# Patient Record
Sex: FEMALE | Race: ASIAN | NOT HISPANIC OR LATINO | Employment: UNEMPLOYED | URBAN - METROPOLITAN AREA
[De-identification: names, ages, dates, MRNs, and addresses within clinical notes are randomized per-mention and may not be internally consistent; named-entity substitution may affect disease eponyms.]

---

## 2017-10-13 ENCOUNTER — OFFICE VISIT - HEALTHEAST (OUTPATIENT)
Dept: FAMILY MEDICINE | Facility: CLINIC | Age: 47
End: 2017-10-13

## 2017-10-13 DIAGNOSIS — R07.89 CHEST WALL PAIN: ICD-10-CM

## 2017-10-13 DIAGNOSIS — E55.9 VITAMIN D DEFICIENCY: ICD-10-CM

## 2017-10-13 DIAGNOSIS — R07.9 CHEST PAIN ON EXERTION: ICD-10-CM

## 2017-10-15 ENCOUNTER — COMMUNICATION - HEALTHEAST (OUTPATIENT)
Dept: FAMILY MEDICINE | Facility: CLINIC | Age: 47
End: 2017-10-15

## 2017-10-16 LAB
ATRIAL RATE - MUSE: 53 BPM
DIASTOLIC BLOOD PRESSURE - MUSE: NORMAL MMHG
INTERPRETATION ECG - MUSE: NORMAL
P AXIS - MUSE: 46 DEGREES
PR INTERVAL - MUSE: 146 MS
QRS DURATION - MUSE: 96 MS
QT - MUSE: 470 MS
QTC - MUSE: 441 MS
R AXIS - MUSE: 33 DEGREES
SYSTOLIC BLOOD PRESSURE - MUSE: NORMAL MMHG
T AXIS - MUSE: 18 DEGREES
VENTRICULAR RATE- MUSE: 53 BPM

## 2017-10-18 ENCOUNTER — RECORDS - HEALTHEAST (OUTPATIENT)
Dept: ADMINISTRATIVE | Facility: OTHER | Age: 47
End: 2017-10-18

## 2017-10-24 ENCOUNTER — OFFICE VISIT - HEALTHEAST (OUTPATIENT)
Dept: FAMILY MEDICINE | Facility: CLINIC | Age: 47
End: 2017-10-24

## 2017-10-24 DIAGNOSIS — R05.8 POST-VIRAL COUGH SYNDROME: ICD-10-CM

## 2017-10-24 DIAGNOSIS — B96.89 BACTERIAL CONJUNCTIVITIS OF BOTH EYES: ICD-10-CM

## 2017-10-24 DIAGNOSIS — H10.9 BACTERIAL CONJUNCTIVITIS OF BOTH EYES: ICD-10-CM

## 2017-10-24 DIAGNOSIS — E55.9 VITAMIN D DEFICIENCY: ICD-10-CM

## 2017-11-10 ENCOUNTER — RECORDS - HEALTHEAST (OUTPATIENT)
Dept: MAMMOGRAPHY | Facility: CLINIC | Age: 47
End: 2017-11-10

## 2017-11-10 ENCOUNTER — RECORDS - HEALTHEAST (OUTPATIENT)
Dept: ADMINISTRATIVE | Facility: OTHER | Age: 47
End: 2017-11-10

## 2017-11-10 ENCOUNTER — OFFICE VISIT - HEALTHEAST (OUTPATIENT)
Dept: FAMILY MEDICINE | Facility: CLINIC | Age: 47
End: 2017-11-10

## 2017-11-10 DIAGNOSIS — M54.50 LUMBAR BACK PAIN: ICD-10-CM

## 2017-11-10 DIAGNOSIS — Z23 NEED FOR IMMUNIZATION AGAINST INFLUENZA: ICD-10-CM

## 2017-11-10 DIAGNOSIS — Z12.31 VISIT FOR SCREENING MAMMOGRAM: ICD-10-CM

## 2017-11-10 DIAGNOSIS — E78.5 HYPERLIPEMIA: ICD-10-CM

## 2017-11-10 DIAGNOSIS — E55.9 VITAMIN D DEFICIENCY: ICD-10-CM

## 2017-11-10 DIAGNOSIS — Z12.31 ENCOUNTER FOR SCREENING MAMMOGRAM FOR MALIGNANT NEOPLASM OF BREAST: ICD-10-CM

## 2017-11-10 LAB
CHOLEST SERPL-MCNC: 270 MG/DL
FASTING STATUS PATIENT QL REPORTED: YES
HDLC SERPL-MCNC: 49 MG/DL
LDLC SERPL CALC-MCNC: 196 MG/DL
TRIGL SERPL-MCNC: 123 MG/DL

## 2017-11-10 ASSESSMENT — MIFFLIN-ST. JEOR: SCORE: 1095.82

## 2017-12-08 ENCOUNTER — OFFICE VISIT - HEALTHEAST (OUTPATIENT)
Dept: FAMILY MEDICINE | Facility: CLINIC | Age: 47
End: 2017-12-08

## 2017-12-08 DIAGNOSIS — J30.9 ALLERGIC RHINITIS: ICD-10-CM

## 2017-12-08 DIAGNOSIS — E55.9 VITAMIN D DEFICIENCY: ICD-10-CM

## 2017-12-08 DIAGNOSIS — L72.3 SEBACEOUS CYST: ICD-10-CM

## 2017-12-08 ASSESSMENT — MIFFLIN-ST. JEOR: SCORE: 1116.24

## 2018-01-18 ENCOUNTER — COMMUNICATION - HEALTHEAST (OUTPATIENT)
Dept: FAMILY MEDICINE | Facility: CLINIC | Age: 48
End: 2018-01-18

## 2018-01-23 ENCOUNTER — RECORDS - HEALTHEAST (OUTPATIENT)
Dept: ADMINISTRATIVE | Facility: OTHER | Age: 48
End: 2018-01-23

## 2018-02-14 ENCOUNTER — COMMUNICATION - HEALTHEAST (OUTPATIENT)
Dept: FAMILY MEDICINE | Facility: CLINIC | Age: 48
End: 2018-02-14

## 2018-02-14 RX ORDER — CELECOXIB 200 MG/1
CAPSULE ORAL
Qty: 30 CAPSULE | Refills: 0 | Status: SHIPPED | OUTPATIENT
Start: 2018-02-14 | End: 2023-02-10

## 2019-01-14 ENCOUNTER — OFFICE VISIT - HEALTHEAST (OUTPATIENT)
Dept: FAMILY MEDICINE | Facility: CLINIC | Age: 49
End: 2019-01-14

## 2019-01-14 ENCOUNTER — HOSPITAL ENCOUNTER (OUTPATIENT)
Dept: LAB | Age: 49
Setting detail: SPECIMEN
Discharge: HOME | End: 2019-01-14

## 2019-01-14 DIAGNOSIS — J30.9 ALLERGIC RHINITIS: ICD-10-CM

## 2019-01-14 DIAGNOSIS — J02.9 SORE THROAT: ICD-10-CM

## 2019-01-14 DIAGNOSIS — R05.9 COUGH: ICD-10-CM

## 2019-01-14 DIAGNOSIS — L72.3 SEBACEOUS CYST: ICD-10-CM

## 2019-01-14 DIAGNOSIS — J30.2 SEASONAL ALLERGIC RHINITIS, UNSPECIFIED TRIGGER: ICD-10-CM

## 2019-01-14 LAB — DEPRECATED S PYO AG THROAT QL EIA: NORMAL

## 2019-01-14 ASSESSMENT — MIFFLIN-ST. JEOR: SCORE: 1086.75

## 2019-01-16 LAB — GROUP A STREP BY PCR: NORMAL

## 2019-05-02 ENCOUNTER — OFFICE VISIT - HEALTHEAST (OUTPATIENT)
Dept: FAMILY MEDICINE | Facility: CLINIC | Age: 49
End: 2019-05-02

## 2019-05-02 DIAGNOSIS — E78.00 PURE HYPERCHOLESTEROLEMIA: ICD-10-CM

## 2019-05-02 DIAGNOSIS — Z23 IMMUNIZATION DUE: ICD-10-CM

## 2019-05-02 DIAGNOSIS — R09.82 POST-NASAL DRIP: ICD-10-CM

## 2019-05-02 DIAGNOSIS — R05.9 COUGH: ICD-10-CM

## 2019-05-02 DIAGNOSIS — Z12.31 VISIT FOR SCREENING MAMMOGRAM: ICD-10-CM

## 2019-05-02 DIAGNOSIS — J30.9 ALLERGIC RHINITIS: ICD-10-CM

## 2019-05-02 LAB
CHOLEST SERPL-MCNC: 258 MG/DL
FASTING STATUS PATIENT QL REPORTED: NO
HDLC SERPL-MCNC: 55 MG/DL
LDLC SERPL CALC-MCNC: 182 MG/DL
TRIGL SERPL-MCNC: 107 MG/DL

## 2019-05-02 RX ORDER — CETIRIZINE HYDROCHLORIDE 10 MG/1
10 TABLET ORAL DAILY
Qty: 30 TABLET | Refills: 6 | Status: SHIPPED | OUTPATIENT
Start: 2019-05-02 | End: 2023-02-10

## 2019-05-02 ASSESSMENT — MIFFLIN-ST. JEOR: SCORE: 1075.41

## 2019-05-20 ENCOUNTER — OFFICE VISIT - HEALTHEAST (OUTPATIENT)
Dept: FAMILY MEDICINE | Facility: CLINIC | Age: 49
End: 2019-05-20

## 2019-05-20 ENCOUNTER — RECORDS - HEALTHEAST (OUTPATIENT)
Dept: MAMMOGRAPHY | Facility: CLINIC | Age: 49
End: 2019-05-20

## 2019-05-20 DIAGNOSIS — R05.9 COUGH: ICD-10-CM

## 2019-05-20 DIAGNOSIS — Z12.31 ENCOUNTER FOR SCREENING MAMMOGRAM FOR MALIGNANT NEOPLASM OF BREAST: ICD-10-CM

## 2019-05-20 ASSESSMENT — MIFFLIN-ST. JEOR: SCORE: 1089.02

## 2019-05-31 ENCOUNTER — OFFICE VISIT - HEALTHEAST (OUTPATIENT)
Dept: FAMILY MEDICINE | Facility: CLINIC | Age: 49
End: 2019-05-31

## 2019-05-31 DIAGNOSIS — Z23 IMMUNIZATION DUE: ICD-10-CM

## 2019-05-31 DIAGNOSIS — Z00.00 ROUTINE GENERAL MEDICAL EXAMINATION AT A HEALTH CARE FACILITY: ICD-10-CM

## 2019-05-31 DIAGNOSIS — R05.9 COUGH: ICD-10-CM

## 2019-05-31 ASSESSMENT — MIFFLIN-ST. JEOR: SCORE: 1088.57

## 2019-06-03 LAB
HPV SOURCE: NORMAL
HUMAN PAPILLOMA VIRUS 16 DNA: NEGATIVE
HUMAN PAPILLOMA VIRUS 18 DNA: NEGATIVE
HUMAN PAPILLOMA VIRUS FINAL DIAGNOSIS: NORMAL
HUMAN PAPILLOMA VIRUS OTHER HR: NEGATIVE
SPECIMEN DESCRIPTION: NORMAL

## 2019-06-13 LAB
BKR LAB AP ABNORMAL BLEEDING: NO
BKR LAB AP BIRTH CONTROL/HORMONES: NORMAL
BKR LAB AP CERVICAL APPEARANCE: NORMAL
BKR LAB AP GYN ADEQUACY: NORMAL
BKR LAB AP GYN INTERPRETATION: NORMAL
BKR LAB AP HPV REFLEX: NORMAL
BKR LAB AP LMP: NORMAL
BKR LAB AP PATIENT STATUS: NORMAL
BKR LAB AP PREVIOUS ABNORMAL: NORMAL
BKR LAB AP PREVIOUS NORMAL: NORMAL
HIGH RISK?: NO
INTERPRETING LAB: NORMAL
PATH REPORT.COMMENTS IMP SPEC: NORMAL
PATH REPORT.COMMENTS IMP SPEC: NORMAL
RESULT FLAG (HE HISTORICAL CONVERSION): NORMAL

## 2021-05-28 NOTE — PROGRESS NOTES
"  Chief Complaint   Patient presents with     Cough     Since January and still cough          HPI:   Rafael Colin is a 48 y.o. female with  c/o cough for the last four months without improvement.  Productive cough. States cough has never gone away.  Occasionally shortness of breath.  Has had some chest pain with coughing.  Sometimes has regurgitation of food with coughing.  No fever.  Throat is itching.  Was seen and given zyrtec-this didn't help.--early May.  Denies heart burn.  Has some pain behind left ear for 3 months. Has h/o abcess drainage that at age 14 year.        ROS:  A 10 point comprehensive review of systems was negative except as noted.     Medications:  Current Outpatient Medications on File Prior to Visit   Medication Sig Dispense Refill     cetirizine (ZYRTEC) 10 MG tablet Take 1 tablet (10 mg total) by mouth daily. 30 tablet 6     artificial tears, hypromellose, (GONIOLSOL) 2.5 % ophthalmic solution 1 drop as needed.       celecoxib (CELEBREX) 200 MG capsule TAKE 1 CAPSULE BY MOUTH DAILY 30 capsule 0     cholecalciferol, vitamin D3, (VITAMIN D3) 2,000 unit capsule Take 1 capsule (2,000 Units total) by mouth daily. 30 capsule 12     ibuprofen (ADVIL,MOTRIN) 600 MG tablet Take 1 mg by mouth every 6 (six) hours as needed for pain.       No current facility-administered medications on file prior to visit.          Social History:  Social History     Tobacco Use     Smoking status: Never Smoker     Smokeless tobacco: Never Used   Substance Use Topics     Alcohol use: No         Physical Exam:   Vitals:    05/20/19 1405   BP: 114/62   Pulse: 65   Resp: 16   Temp: 98.5  F (36.9  C)   TempSrc: Oral   SpO2: 99%   Weight: 129 lb (58.5 kg)   Height: 4' 9\" (1.448 m)       GENERAL:   Alert. Oriented.  EYES: Clear  HENT:  Ears: R TM pearly gray. Normal landmarks. L TM pearly gray.  Normal landmarks  Nose: Clear.  Sinuses: Nontender.  Oropharynx:  No erythema. No exudate.  NECK: Supple. No " adenopathy.  LUNGS: Clear to ascultation.  No crackles.  No wheezing  HEART: RRR  SKIN:  No rash.     XRAY:  CXR PA & LAT:   No cardiomegaly, infiltrate or effusion.  Normal chest. Personally reviewed film.      Assessment/Plan:    1. Cough  XR Chest 2 Views      Prolonged cough.    Has been on zyrtec without improvement.  Description is more of post nasal drainage than heartburn.    Will continue with zyrtec and add flonase nasal spray.    Plan four week trial.  Recheck before then if worsening otherwise in four week.hayden Beckwith MD      5/20/2019    The following portions of the patient's history were reviewed and updated as appropriate: allergies, current medications, past family history, past medical history, past social history, past surgical history and problem list.

## 2021-05-28 NOTE — PROGRESS NOTES
ASSESSMENT AND PLAN:  1. Visit for screening mammogram    - Mammo Screening Bilateral; Future    2. Pure hypercholesterolemia  She will return for a physical and her cholesterol drawn at that time  - Lipid Cascade    3. Post-nasal drip    She has significant postnasal drip.  4. Cough  Her cough has returned I was still her on ceterizine    5. Allergic rhinitis    - cetirizine (ZYRTEC) 10 MG tablet; Take 1 tablet (10 mg total) by mouth daily.  Dispense: 30 tablet; Refill: 6            Orders Placed This Encounter   Procedures     Mammo Screening Bilateral     Standing Status:   Future     Standing Expiration Date:   8/2/2020     Order Specific Question:   Patient's previous breast density:     Answer:   Scattered fibroglandular density [2]     Order Specific Question:   Is the patient pregnant?     Answer:   No     Order Specific Question:   Can the procedure be changed per Radiologist protocol?     Answer:   Yes     Td, Preservative Free (green label)     Lipid Cascade     Order Specific Question:   Fasting is required?     Answer:   No     Medications Discontinued During This Encounter   Medication Reason     cetirizine (ZYRTEC) 10 MG tablet Reorder       Return in about 1 month (around 5/30/2019) for Annual physical with a female provider and in 2 months for follow up on cough and sore throat.    CHIEF COMPLAINT:  Chief Complaint   Patient presents with     Sore Throat     [id number]      Cough     has leaking urine due to cough, lack of sleep       HISTORY OF PRESENT ILLNESS:  Rafael is a 48 y.o. female who presents to the clinic today for sore throat and cough. Rafael is present with a Claudia . She has had a sore throat and cough for 4 months. The patient was seen back in January by Dr. Beckwith at which time Strep test was negative. She has not been taking Zyrtec or dextromethorphan that had been recommended. Her sore throat continues to be sore and itchy. Her cough has caused her to leak urine, and it also  "interferes with sleep. Paw notes chest pain with coughing. She is uncertain if she snores at night.    REVIEW OF SYSTEMS:   ENT: Sore throat.  Respiratory: Cough.   : Urinary leakage with cough.  All other systems are negative.    PFSH:  She is . She is unemployed. Reviewed as below.     TOBACCO USE:  Social History     Tobacco Use   Smoking Status Never Smoker   Smokeless Tobacco Never Used       VITALS:  Vitals:    05/02/19 1436   BP: 122/80   Pulse: (!) 59   Resp: 16   Temp: 97.8  F (36.6  C)   TempSrc: Oral   SpO2: 99%   Weight: 126 lb (57.2 kg)   Height: 4' 9\" (1.448 m)     Wt Readings from Last 3 Encounters:   05/02/19 126 lb (57.2 kg)   01/14/19 128 lb 8 oz (58.3 kg)   12/08/17 135 lb (61.2 kg)     Body mass index is 27.27 kg/m .    PHYSICAL EXAM:  General: Alert, cooperative, no distress, appears stated age  Head: Normocephalic, without obvious abnormality, atraumatic  Eyes: PERRL, conjunctiva/cornea clear, EOM's intact  Ears: Normal TM's and external ear canals, both ears  Nose: Nares normal, septum midline, mucosa normal, no drainage or sinus tenderness  Throat: Lips, mucosa, and tongue normal; teeth and gums normal, oropharynx benign, postnasal drainage present  Lungs: Clear to auscultation bilaterally, respirations unlabored  Heart: Regular rate and rhythm, S1 and S2 normal, no murmur, rub, or gallop  Neurologic:  A & O x 3.  No tremor, no focal findings.  Normal gait.     DATA REVIEWED:  Additional History from Old Records Summarized (2): Reviewed Dr. Beckwith's 01/14/2019 note regarding sore throat and cough, attributed to viral infection.  Decision to Obtain Records (1): none  Radiology Tests Summarized or Ordered (1): Screening mammogram ordered.  Labs Reviewed or Ordered (1): Lipid cascade ordered.  Medicine Test Summarized or Ordered (1): none  Independent Review of EKG or X-RAY(2 each): none      ITracy, am scribing for and in the presence of, Dr. Knight.    IDr. Knight, personally " performed the services described in this documentation, as scribed by Tracy Cartagena in my presence, and it is both accurate and complete.      MEDICATIONS:  Current Outpatient Medications   Medication Sig Dispense Refill     artificial tears, hypromellose, (GONIOLSOL) 2.5 % ophthalmic solution 1 drop as needed.       celecoxib (CELEBREX) 200 MG capsule TAKE 1 CAPSULE BY MOUTH DAILY 30 capsule 0     cetirizine (ZYRTEC) 10 MG tablet Take 1 tablet (10 mg total) by mouth daily. 30 tablet 6     cholecalciferol, vitamin D3, (VITAMIN D3) 2,000 unit capsule Take 1 capsule (2,000 Units total) by mouth daily. 30 capsule 12     ibuprofen (ADVIL,MOTRIN) 600 MG tablet Take 1 mg by mouth every 6 (six) hours as needed for pain.       No current facility-administered medications for this visit.        Total Data Points: 4    Please note that this clinical encounter uses voice recognition software, there may be typographical errors present

## 2021-05-29 NOTE — PROGRESS NOTES
Assessment:Plan     1. Routine general medical examination at a health care facility  - Gynecologic Cytology (PAP Smear)    2. Cough  ?  Reflux.  Continue nasal spray and Claritin.  - guaiFENesin (ROBITUSSIN) 100 mg/5 mL syrup; Take 10 mL (200 mg total) by mouth 3 (three) times a day as needed for cough.  Dispense: 200 mL; Refill: 0    3. Immunization due  - Tdap vaccine greater than or equal to 8yo IM    Subjective:      Rafael Colin is a 48 y.o. female who presents for an annual exam. The patient is not currently sexually active. The patient participates in regular exercise: no. The patient reports that there is not domestic violence in her life.   She was seen 2 weeks ago for cough.  Chest x-ray was clear.  Was given Flonase and Claritin.  States that symptoms are somewhat improving but requesting cough syrup.  No fever.  Cough is nonproductive.      Healthy Habits:   Regular Exercise: Yes  Sunscreen Use: Yes  Healthy Diet: Yes  Dental Visits Regularly: Yes  Seat Belt: Yes  Sexually active: No  Self Breast Exam Monthly:Yes  Colonoscopy: No  Lipid Profile: Yes  Glucose Screen: Yes  Prevention of Osteoporosis: N/A  Last Dexa: N/A  Guns at Home:  No      Immunization History   Administered Date(s) Administered     Hep A, historic 10/01/2008, 04/01/2009     Hep B, historic 03/06/2007, 10/01/2008, 04/01/2009     Influenza [id number], 01/13/2010     Influenza, inj, historic,unspecified 11/05/2010, 10/04/2011     Influenza, seasonal,quad inj 36+ mos 10/23/2015, 11/10/2017     Influenza, seasonal,quad inj 6-35 mos 11/12/2008, 10/02/2009     Influenza,seasonal quad, PF, 36+MOS 01/14/2019     MMR 08/18/2008, 03/19/2009     Mumps 03/06/2007     Td,adult,historic,unspecified 03/06/2007, 03/19/2009     Tdap 08/18/2008     Varicella 03/06/2007, 08/18/2008     Immunization status: up to date and documented.    No exam data present    Gynecologic History  No LMP recorded.  Contraception: none  Last Pap: Several years ago  Last mammogram:  19. Results were: normal      OB History    Para Term  AB Living   4 4 4         SAB TAB Ectopic Multiple Live Births                  # Outcome Date GA Lbr Hany/2nd Weight Sex Delivery Anes PTL Lv   4 Term            3 Term            2 Term            1 Term                Current Outpatient Medications   Medication Sig Dispense Refill     artificial tears, hypromellose, (GONIOLSOL) 2.5 % ophthalmic solution 1 drop as needed.       celecoxib (CELEBREX) 200 MG capsule TAKE 1 CAPSULE BY MOUTH DAILY 30 capsule 0     cetirizine (ZYRTEC) 10 MG tablet Take 1 tablet (10 mg total) by mouth daily. 30 tablet 6     cholecalciferol, vitamin D3, (VITAMIN D3) 2,000 unit capsule Take 1 capsule (2,000 Units total) by mouth daily. 30 capsule 12     fluticasone propionate (FLONASE) 50 mcg/actuation nasal spray 2 sprays into each nostril daily. 16 g 11     ibuprofen (ADVIL,MOTRIN) 600 MG tablet Take 1 mg by mouth every 6 (six) hours as needed for pain.       No current facility-administered medications for this visit.      No past medical history on file.  No past surgical history on file.  Amoxicillin; Contrave [naltrexone-bupropion]; and Sulfamethoxazole-trimethoprim  Family History   Problem Relation Age of Onset     Breast cancer Neg Hx      Social History     Socioeconomic History     Marital status:      Spouse name: Not on file     Number of children: Not on file     Years of education: Not on file     Highest education level: Not on file   Occupational History     Occupation: Unemployed   Social Needs     Financial resource strain: Not on file     Food insecurity:     Worry: Not on file     Inability: Not on file     Transportation needs:     Medical: Not on file     Non-medical: Not on file   Tobacco Use     Smoking status: Never Smoker     Smokeless tobacco: Never Used   Substance and Sexual Activity     Alcohol use: No     Drug use: No     Sexual activity: Never   Lifestyle     Physical activity:  "    Days per week: Not on file     Minutes per session: Not on file     Stress: Not on file   Relationships     Social connections:     Talks on phone: Not on file     Gets together: Not on file     Attends Zoroastrian service: Not on file     Active member of club or organization: Not on file     Attends meetings of clubs or organizations: Not on file     Relationship status: Not on file     Intimate partner violence:     Fear of current or ex partner: Not on file     Emotionally abused: Not on file     Physically abused: Not on file     Forced sexual activity: Not on file   Other Topics Concern     Not on file   Social History Narrative    [place].  [place]       Review of Systems  Review of Systems        No abnormal vaginal discharge or irritation.  No depression symptoms.  No acute fever.  No weight loss or night sweats.  Objective:         Vitals:    05/31/19 1013   BP: 122/82   Pulse: 60   Resp: 16   Temp: 97.9  F (36.6  C)   TempSrc: Oral   SpO2: 99%   Weight: 128 lb 14.4 oz (58.5 kg)   Height: 4' 9\" (1.448 m)     Body mass index is 27.89 kg/m .    Physical  Physical Exam     Gen - alert, orientated, NAD  Eyes - fundascopic exam limited by the undialated pupil but looks symmetric  ENT - oropharynx clear, TMs clear  Neck - supple, no palpable mass or lymphadenopathy  CV - RRR, no murmur  Breast - no dominant mass on either side, no axillary mass.  Resp - lungs CTA  Ab - soft, nontender, no palpable mass or organomegaly   - normal appearance to the external genitalia, vaginal mucosa normal, physiologic discharge, no palpable adenexal mass, cervix appears normal  Extrem - warm, no edema  Neuro - CN II-XII intact, strength, sensation, reflexes intact and symmetric  Skin - no rash.  No atypical appearing lesions seen.          "

## 2021-05-31 VITALS — WEIGHT: 135 LBS | BODY MASS INDEX: 29.12 KG/M2 | HEIGHT: 57 IN

## 2021-05-31 VITALS — WEIGHT: 134.56 LBS | BODY MASS INDEX: 30.71 KG/M2

## 2021-05-31 VITALS — BODY MASS INDEX: 30.64 KG/M2 | WEIGHT: 134.25 LBS

## 2021-05-31 VITALS — BODY MASS INDEX: 30.14 KG/M2 | WEIGHT: 134 LBS | HEIGHT: 56 IN

## 2021-06-01 ENCOUNTER — RECORDS - HEALTHEAST (OUTPATIENT)
Dept: ADMINISTRATIVE | Facility: CLINIC | Age: 51
End: 2021-06-01

## 2021-06-02 VITALS — HEIGHT: 57 IN | BODY MASS INDEX: 27.72 KG/M2 | WEIGHT: 128.5 LBS

## 2021-06-03 VITALS — WEIGHT: 129 LBS | HEIGHT: 57 IN | BODY MASS INDEX: 27.83 KG/M2

## 2021-06-03 VITALS — WEIGHT: 128.9 LBS | HEIGHT: 57 IN | BODY MASS INDEX: 27.81 KG/M2

## 2021-06-03 VITALS — WEIGHT: 126 LBS | HEIGHT: 57 IN | BODY MASS INDEX: 27.18 KG/M2

## 2021-06-13 NOTE — PROGRESS NOTES
ASSESSMENT AND PLAN:  1. Bacterial conjunctivitis of both eyes patient will start ofloxacin.    2. Post-viral cough syndrome cetirizine suggested for cough.  She has not been using Lorazepam.    3. Vitamin D deficiency         CHIEF COMPLAINT:  Chief Complaint   Patient presents with     itchy eyes       HISTORY OF PRESENT ILLNESS:  Rafael is a 47 y.o. female presenting with itchy eyes. Rafael is present with a Claudia . She notes that for the past few days, she has been experiencing bilateral eye watering and itching. Her vision has become blurry due the watering.  She denies discharge or crusting over here eyes. She denies known ill contacts.     REVIEW OF SYSTEMS:   She states that she has an intermittent cough that worsens night time.   All other 10 point review of systems are negative.    PFSH:  . Pertinent past, family, social and medical history reviewed.     TOBACCO USE:  History   Smoking Status     Never Smoker   Smokeless Tobacco     Never Used       VITALS:  Vitals:    10/24/17 0925   BP: 118/78   Patient Site: Right Arm   Patient Position: Sitting   Cuff Size: Adult Large   Pulse: 60   Resp: 24   Temp: 98.4  F (36.9  C)   TempSrc: Oral   Weight: 134 lb 9 oz (61 kg)     Wt Readings from Last 3 Encounters:   10/24/17 134 lb 9 oz (61 kg)   10/13/17 134 lb 4 oz (60.9 kg)   01/12/16 129 lb 9 oz (58.8 kg)     Body mass index is 30.71 kg/(m^2).    PHYSICAL EXAM:  General: Alert, cooperative, no distress, appears stated age  Head: Normocephalic, without obvious abnormality, atraumatic  Eyes: Watery discharge over lateral canthus of left eye and medial canthus of right eye. No conjunctival injection.   Lungs: Clear to auscultation bilaterally, respirations unlabored  Chest wall: No tenderness or deformity  Heart: Regular rate and rhythm, S1 and S2 normal, no murmur, rub, or gallop  CVS: No edema noted.   Neurologic: No tremor, no focal findings.     Psych: Oriented x3. Affect normal.     DATA  REVIEWED:  Additional History from Old Records Summarized (2): None  Decision to Obtain Records (1): None  Radiology Tests Summarized or Ordered (1): None  Labs Reviewed or Ordered (1): none  Medicine Test Summarized or Ordered (1): None  Independent Review of EKG or X-RAY(2 each): none    The visit lasted a total of 9 minutes face to face with the patient. Over 50% of the time was spent counseling and educating the patient about eye itching.     Gume VILLA, am scribing for and in the presence of, Dr. Knight.    marly VILLA personally performed the services described in this documentation, as scribed by Gume Ferrara in my presence, and it is both accurate and complete.      MEDICATIONS:  Current Outpatient Prescriptions   Medication Sig Dispense Refill     artificial tears, hypromellose, (GONIOLSOL) 2.5 % ophthalmic solution 1 drop as needed.       celecoxib (CELEBREX) 200 MG capsule Take 1 capsule (200 mg total) by mouth daily. 30 capsule 2     cholecalciferol, vitamin D3, (VITAMIN D3) 2,000 unit cap Take 2,000 Units by mouth daily.       ibuprofen (ADVIL,MOTRIN) 600 MG tablet Take 1 mg by mouth every 6 (six) hours as needed for pain.       loratadine (CLARITIN) 10 mg tablet Take 1 tablet (10 mg total) by mouth daily. 30 tablet 3     meloxicam (MOBIC) 7.5 MG tablet Take 7.5 mg by mouth 2 times a day at 6:00 am and 4:00 pm.       methocarbamol (ROBAXIN) 750 MG tablet Take 750 mg by mouth 2 times a day at 6:00 am and 4:00 pm.       No current facility-administered medications for this visit.        Total Data Points: 0

## 2021-06-13 NOTE — PROGRESS NOTES
ASSESSMENT AND PLAN:  1. Chest pain on exertion patient complained of chest pain which occurs after exertion and can last for about 10-15 minutes afterwards.  She says the episodes of chest pain do not occur at rest.  She states that the pain has decreased since she stopped working she does not have a personal history of heart disease is no family history of heart disease she has no history of hypertension she does not smoke.  She is able to do her housework without discomfort.  She mentions that the symptoms are associated with shortness of breath I obtained an EKG which I found to be unremarkable to a sinus rhythm is noted there was no acute or chronic ST changes noted.  I informed her of the benign nature of the results if her chest pain continues with mild exertion I will need to have her have a stress test hemogram done today which revealed thrombocytopenia BMP is pending Electrocardiogram Perform and Read    HM1(CBC and Differential)    HM1 (CBC with Diff)   2. Chest wall pain pain was precipitated when I palpated the sternum.  No evidence of a hematoma.  Celebrex started and will have her take that daily.  She understands the side effects of this medication. Electrocardiogram Perform and Read   3. Vitamin D deficiency  Basic Metabolic Panel       CHIEF COMPLAINT:  Chief Complaint   Patient presents with     Back Pain     and chest wall pain, started in september per pt       HISTORY OF PRESENT ILLNESS:  Rafael is a 47 y.o. female presenting with chest wall pain. Rafael is present with a Claudia . She notes that her chest pain started in September of this year. She notes that the pain will intermittently start over the RUQ of her abdomen and radiate up to her chest. She also feels like her organs are being shifted upward in her body.  Her chest wall pain is intermittent but she notes that her chest pain is aggravated by lifting heavy objects.  She has quit working due to having to lift heavy objects at  work. She has associated shortness of breath with these symptoms. She has been eating well.     REVIEW OF SYSTEMS:   She is also experiencing intermittent burning back pain.   She denies feeling very stressed.    All other 10 point review of systems are negative.    PFSH:  . Pertinent past, family, social and medical history reviewed.     TOBACCO USE:  History   Smoking Status     Never Smoker   Smokeless Tobacco     Never Used       VITALS:  Vitals:    10/13/17 1139   BP: 130/82   Patient Site: Left Arm   Patient Position: Sitting   Cuff Size: Adult Regular   Pulse: (!) 55   Temp: 98  F (36.7  C)   TempSrc: Oral   SpO2: 99%   Weight: 134 lb 4 oz (60.9 kg)     Wt Readings from Last 3 Encounters:   10/13/17 134 lb 4 oz (60.9 kg)   01/12/16 129 lb 9 oz (58.8 kg)   12/16/15 127 lb 11.2 oz (57.9 kg)     Body mass index is 30.64 kg/(m^2).      PHYSICAL EXAM:  General: Alert, cooperative, no distress, appears stated age  Head: Normocephalic, without obvious abnormality, atraumatic  Nose: Nares normal, septum midline, mucosa normal, no drainage or sinus tenderness  Throat: Lips, mucosa, and tongue normal; teeth and gums normal  Musculoskeletal: Back symmetric, no curvature, ROM normal, no CVA tenderness.  Lungs: Clear to auscultation bilaterally, respirations unlabored  Chest wall: Tenderness over sternum    Heart: Regular rate and rhythm, S1 and S2 normal, no murmur, rub, or gallop  CVS: No edema noted.   Abdomen: Soft, non tender, bowel sounds active all four quadrants, no masses, no organomegaly.  Neurologic: No tremor, no focal findings.     Psych: Oriented x3. Affect normal.     EKG:Normal sinus rhythm.     DATA REVIEWED:  Additional History from Old Records Summarized (2): Reviewed Dr. Knight's note from 1/12/2016 regarding allergies and last visit.   Decision to Obtain Records (1): None  Radiology Tests Summarized or Ordered (1): None  Labs Reviewed or Ordered (1): Labs ordered.   Medicine Test Summarized or Ordered  (1): EKG ordered.  Independent Review of EKG or X-RAY(2 each): EKG personally reviewed.     The visit lasted a total of 15 minutes face to face with the patient. Over 50% of the time was spent counseling and educating the patient about chest wall pain.     Gume VILLA, am scribing for and in the presence of, Dr. Knight.    marly VILLA, personally performed the services described in this documentation, as scribed by Gume Ferrara in my presence, and it is both accurate and complete.      MEDICATIONS:  Current Outpatient Prescriptions   Medication Sig Dispense Refill     artificial tears, hypromellose, (GONIOLSOL) 2.5 % ophthalmic solution 1 drop as needed.       cholecalciferol, vitamin D3, (VITAMIN D3) 2,000 unit cap Take 2,000 Units by mouth daily.       ibuprofen (ADVIL,MOTRIN) 600 MG tablet Take 1 mg by mouth every 6 (six) hours as needed for pain.       loratadine (CLARITIN) 10 mg tablet Take 1 tablet (10 mg total) by mouth daily. 30 tablet 3     meloxicam (MOBIC) 7.5 MG tablet Take 7.5 mg by mouth 2 times a day at 6:00 am and 4:00 pm.       methocarbamol (ROBAXIN) 750 MG tablet Take 750 mg by mouth 2 times a day at 6:00 am and 4:00 pm.       No current facility-administered medications for this visit.        Total Data Points: 6

## 2021-06-14 NOTE — PROGRESS NOTES
"  Chief Complaint   Patient presents with     Back Pain         HPI:   Rafael Colin is a 47 y.o. female with  c/o back pain.  Several days ago had drainage out of sore on back.  Patient apparently had a cyst drained in 2012.  No fever.  Has had some cramping in back.  No medication.    ROS:  A 10 point comprehensive review of systems was negative except as noted.     Medications:  Current Outpatient Prescriptions on File Prior to Visit   Medication Sig Dispense Refill     artificial tears, hypromellose, (GONIOLSOL) 2.5 % ophthalmic solution 1 drop as needed.       celecoxib (CELEBREX) 200 MG capsule Take 1 capsule (200 mg total) by mouth daily. 30 capsule 2     cetirizine (ZYRTEC) 10 MG tablet Take 1 tablet (10 mg total) by mouth daily. 15 tablet 2     cholecalciferol, vitamin D3, (VITAMIN D3) 2,000 unit capsule Take 1 capsule (2,000 Units total) by mouth daily. 30 capsule 12     ibuprofen (ADVIL,MOTRIN) 600 MG tablet Take 1 mg by mouth every 6 (six) hours as needed for pain.       loratadine (CLARITIN) 10 mg tablet Take 1 tablet (10 mg total) by mouth daily. 30 tablet 3     meloxicam (MOBIC) 7.5 MG tablet Take 7.5 mg by mouth 2 times a day at 6:00 am and 4:00 pm.       methocarbamol (ROBAXIN) 750 MG tablet Take 750 mg by mouth 2 times a day at 6:00 am and 4:00 pm.       ofloxacin (OCUFLOX) 0.3 % ophthalmic solution 1 drop 4 (four) times a day.       No current facility-administered medications on file prior to visit.          Social History:  Social History   Substance Use Topics     Smoking status: Never Smoker     Smokeless tobacco: Never Used     Alcohol use No         Physical Exam:   Vitals:    12/08/17 0837   BP: 124/78   Patient Site: Left Arm   Patient Position: Sitting   Cuff Size: Adult Regular   Pulse: 64   Resp: 16   Temp: 98.2  F (36.8  C)   TempSrc: Oral   Weight: 135 lb (61.2 kg)   Height: 4' 9\" (1.448 m)       GEN:  NAD  LUNGS:  Clear to auscultation without wheezing.  Normal effort.  HEART:  " RRR without murmur, rub or gallop   SKIN:  4mm cyst on upper back near midline.  Nonfluctuant.  Nontender.  No erythema        Assessment/Plan:    1. Sebaceous cyst  Not infected at this time.  Try warm compresses several times daily.  Recheck for problems.    2. Vitamin D deficiency  Recheck level.  - Vitamin D, Total (25-Hydroxy)  - cholecalciferol, vitamin D3, (VITAMIN D3) 2,000 unit capsule; Take 1 capsule (2,000 Units total) by mouth daily.  Dispense: 30 capsule; Refill: 12    3. Allergic rhinitis  Refilled zyrtec as requested.  - cetirizine (ZYRTEC) 10 MG tablet; Take 1 tablet (10 mg total) by mouth daily.  Dispense: 30 tablet; Refill: 6       The following portions of the patient's history were reviewed and updated as appropriate: allergies, current medications, past family history, past medical history, past social history, past surgical history and problem list.    Erik Beckwith MD      12/8/2017

## 2021-06-14 NOTE — PROGRESS NOTES
"ASSESSMENT AND PLAN:  1. Need for immunization against influenza  Influenza, Seasonal,Quad Inj, 36+ MOS   2. Visit for screening mammogram  Mammo Screening Bilateral   3. Hyperlipemia cholesterols been elevated in the past repeating that today she will be informed of the results Lipid Profile   4. Vitamin D deficiency improvement in myalgia noted continue vitamin D improvement in chest wall pain    5. Lumbar back pain pain of the sternum is reduced, continue taking Celebrex no side effects noted          No orders of the defined types were placed in this encounter.    There are no discontinued medications.    No Follow-up on file.    CHIEF COMPLAINT:  Chief Complaint   Patient presents with     Allergies       HISTORY OF PRESENT ILLNESS:  Rafael is a 47 y.o. female presenting for a follow-up. Rafael is present with a Claudia .     Allergies: Her allergies are currently controlled with cetrizine.    Chest Wall Pain: Her chest wall pain has improved but is still present over her sternum. She states that she did not know that the Celebrex was for her chest pain so she has been taking the Celebrex intermittently. Her chest pain worsens when she needs to lift objects.     REVIEW OF SYSTEMS:   She denies shortness of breath.    All other 10 point review of systems are negative.    PFSH:   Pertinent past, family, social and medical history reviewed.     TOBACCO USE:  History   Smoking Status     Never Smoker   Smokeless Tobacco     Never Used       VITALS:  Vitals:    11/10/17 0838   BP: 128/80   Patient Site: Left Arm   Patient Position: Sitting   Cuff Size: Adult Regular   Pulse: 72   Resp: 16   Temp: 98.4  F (36.9  C)   TempSrc: Oral   Weight: 134 lb (60.8 kg)   Height: 4' 8\" (1.422 m)     Wt Readings from Last 3 Encounters:   11/10/17 134 lb (60.8 kg)   10/24/17 134 lb 9 oz (61 kg)   10/13/17 134 lb 4 oz (60.9 kg)     Body mass index is 30.04 kg/(m^2).      PHYSICAL EXAM:  General: Alert, cooperative, no distress, " appears stated age  Head: Normocephalic, without obvious abnormality, atraumatic  Musculoskeletal: Tenderness over sternum   Lungs: Clear to auscultation bilaterally, respirations unlabored  Heart: Regular rate and rhythm, S1 and S2 normal, no murmur, rub, or gallop  CVS: No edema noted.   Psych: Oriented x3. Affect normal.     DATA REVIEWED:  Additional History from Old Records Summarized (2): None  Decision to Obtain Records (1): none  Radiology Tests Summarized or Ordered (1): None  Labs Reviewed or Ordered (1): None  Medicine Test Summarized or Ordered (1): None  Independent Review of EKG or X-RAY(2 each): none    The visit lasted a total of 10 minutes face to face with the patient. Over 50% of the time was spent counseling and educating the patient about chest wall pain.     IGume, am scribing for and in the presence of, Dr. Knight.    marly VILLA, personally performed the services described in this documentation, as scribed by Gume Ferrara in my presence, and it is both accurate and complete.      MEDICATIONS:  Current Outpatient Prescriptions   Medication Sig Dispense Refill     cetirizine (ZYRTEC) 10 MG tablet Take 1 tablet (10 mg total) by mouth daily. 15 tablet 2     cholecalciferol, vitamin D3, (VITAMIN D3) 2,000 unit capsule Take 1 capsule (2,000 Units total) by mouth daily. 30 capsule 0     ofloxacin (OCUFLOX) 0.3 % ophthalmic solution 1 drop 4 (four) times a day.       artificial tears, hypromellose, (GONIOLSOL) 2.5 % ophthalmic solution 1 drop as needed.       celecoxib (CELEBREX) 200 MG capsule Take 1 capsule (200 mg total) by mouth daily. 30 capsule 2     ibuprofen (ADVIL,MOTRIN) 600 MG tablet Take 1 mg by mouth every 6 (six) hours as needed for pain.       loratadine (CLARITIN) 10 mg tablet Take 1 tablet (10 mg total) by mouth daily. 30 tablet 3     meloxicam (MOBIC) 7.5 MG tablet Take 7.5 mg by mouth 2 times a day at 6:00 am and 4:00 pm.       methocarbamol (ROBAXIN) 750 MG  tablet Take 750 mg by mouth 2 times a day at 6:00 am and 4:00 pm.       No current facility-administered medications for this visit.        Total Data Points: 0

## 2021-06-23 NOTE — PROGRESS NOTES
"  Chief Complaint   Patient presents with     Sore throat, SOB.     For about  a week.         HPI:   Rafael Colin is a 48 y.o. female with  c/o sore throat with mild shortness of breath for the last week. Some stuffy nose.  Coughing, productive.  No chest pain.  Left ear pain.   No stomach symptoms.  No dysuria.  No rash.  No medication tried.    No one else at home is sick.      ROS:  A 10 point comprehensive review of systems was negative except as noted.     Medications:  Current Outpatient Medications on File Prior to Visit   Medication Sig Dispense Refill     artificial tears, hypromellose, (GONIOLSOL) 2.5 % ophthalmic solution 1 drop as needed.       celecoxib (CELEBREX) 200 MG capsule TAKE 1 CAPSULE BY MOUTH DAILY 30 capsule 0     cetirizine (ZYRTEC) 10 MG tablet Take 1 tablet (10 mg total) by mouth daily. 30 tablet 6     cholecalciferol, vitamin D3, (VITAMIN D3) 2,000 unit capsule Take 1 capsule (2,000 Units total) by mouth daily. 30 capsule 12     ibuprofen (ADVIL,MOTRIN) 600 MG tablet Take 1 mg by mouth every 6 (six) hours as needed for pain.       No current facility-administered medications on file prior to visit.          Social History:  Social History     Tobacco Use     Smoking status: Never Smoker     Smokeless tobacco: Never Used   Substance Use Topics     Alcohol use: No         Physical Exam:   Vitals:    01/14/19 1903   BP: 128/80   Patient Site: Left Arm   Patient Position: Sitting   Cuff Size: Adult Regular   Pulse: 62   Resp: 17   Temp: 98.7  F (37.1  C)   TempSrc: Oral   SpO2: 99%   Weight: 128 lb 8 oz (58.3 kg)   Height: 4' 9\" (1.448 m)       GENERAL:   Alert. Oriented.  EYES: Clear  HENT:  Ears: R TM pearly gray. Normal landmarks. L TM pearly gray.  Normal landmarks  Nose: Clear.  Sinuses: Nontender.  Oropharynx:  No erythema. No exudate.  NECK: Supple. No adenopathy.  LUNGS: Clear to ascultation.  No crackles.  No wheezing  HEART: RRR  SKIN:  Cyst midline upper " back    LABS:  Results for orders placed or performed in visit on 01/14/19   Rapid Strep A Screen- Throat Swab   Result Value Ref Range    Rapid Strep A Antigen No Group A Strep detected, presumptive negative No Group A Strep detected, presumptive negative        Assessment/Plan:    1. Sore throat  Rapid Strep A Screen- Throat Swab    Group A Strep, RNA Direct Detection, Throat   2. Seasonal allergic rhinitis, unspecified trigger     3. Allergic rhinitis  cetirizine (ZYRTEC) 10 MG tablet   4. Cough  dextromethorphan-guaifenesin  mg/5 mL Liqd   5. Sebaceous cyst           Discussed viral infection.  No antibiotics indicated.   Refilled zyrtec to use for itchy throat.  dextromethophan for cough.  Recheck for problems.    States the lump on her is sore and tender at times.  Has been enlarged in the past.  She would like to have it removed.  She will schedule with her primary to have this taken off.          Erik Beckwith MD      1/14/2019    The following portions of the patient's history were reviewed and updated as appropriate: allergies, current medications, past family history, past medical history, past social history, past surgical history and problem list.

## 2023-02-10 ENCOUNTER — ANCILLARY PROCEDURE (OUTPATIENT)
Dept: MAMMOGRAPHY | Facility: CLINIC | Age: 53
End: 2023-02-10
Attending: FAMILY MEDICINE
Payer: COMMERCIAL

## 2023-02-10 ENCOUNTER — ANCILLARY PROCEDURE (OUTPATIENT)
Dept: GENERAL RADIOLOGY | Facility: CLINIC | Age: 53
End: 2023-02-10
Attending: FAMILY MEDICINE
Payer: COMMERCIAL

## 2023-02-10 ENCOUNTER — OFFICE VISIT (OUTPATIENT)
Dept: FAMILY MEDICINE | Facility: CLINIC | Age: 53
End: 2023-02-10
Payer: COMMERCIAL

## 2023-02-10 VITALS
DIASTOLIC BLOOD PRESSURE: 84 MMHG | HEIGHT: 56 IN | OXYGEN SATURATION: 97 % | WEIGHT: 130.44 LBS | TEMPERATURE: 98 F | RESPIRATION RATE: 16 BRPM | SYSTOLIC BLOOD PRESSURE: 138 MMHG | HEART RATE: 58 BPM | BODY MASS INDEX: 29.34 KG/M2

## 2023-02-10 DIAGNOSIS — L29.9 PRURITIC DISORDER: ICD-10-CM

## 2023-02-10 DIAGNOSIS — M25.532 PAIN IN THE WRIST, LEFT: ICD-10-CM

## 2023-02-10 DIAGNOSIS — H91.92 DEAFNESS IN LEFT EAR: ICD-10-CM

## 2023-02-10 DIAGNOSIS — E78.00 PURE HYPERCHOLESTEROLEMIA: ICD-10-CM

## 2023-02-10 DIAGNOSIS — J30.89 NON-SEASONAL ALLERGIC RHINITIS, UNSPECIFIED TRIGGER: ICD-10-CM

## 2023-02-10 DIAGNOSIS — Z11.59 NEED FOR HEPATITIS C SCREENING TEST: ICD-10-CM

## 2023-02-10 DIAGNOSIS — Z11.4 SCREENING FOR HIV (HUMAN IMMUNODEFICIENCY VIRUS): ICD-10-CM

## 2023-02-10 DIAGNOSIS — J30.2 SEASONAL ALLERGIC RHINITIS, UNSPECIFIED TRIGGER: ICD-10-CM

## 2023-02-10 DIAGNOSIS — Z12.31 VISIT FOR SCREENING MAMMOGRAM: ICD-10-CM

## 2023-02-10 DIAGNOSIS — M54.50 LUMBAR BACK PAIN: Primary | ICD-10-CM

## 2023-02-10 DIAGNOSIS — M25.561 RIGHT KNEE PAIN, UNSPECIFIED CHRONICITY: ICD-10-CM

## 2023-02-10 DIAGNOSIS — H10.45 OTHER CHRONIC ALLERGIC CONJUNCTIVITIS OF BOTH EYES: ICD-10-CM

## 2023-02-10 LAB
ALBUMIN SERPL BCG-MCNC: 4.3 G/DL (ref 3.5–5.2)
ALP SERPL-CCNC: 70 U/L (ref 35–104)
ALT SERPL W P-5'-P-CCNC: 18 U/L (ref 10–35)
ANION GAP SERPL CALCULATED.3IONS-SCNC: 12 MMOL/L (ref 7–15)
AST SERPL W P-5'-P-CCNC: 24 U/L (ref 10–35)
BILIRUB SERPL-MCNC: 0.2 MG/DL
BUN SERPL-MCNC: 11 MG/DL (ref 6–20)
CALCIUM SERPL-MCNC: 9.2 MG/DL (ref 8.6–10)
CHLORIDE SERPL-SCNC: 108 MMOL/L (ref 98–107)
CHOLEST SERPL-MCNC: 276 MG/DL
CREAT SERPL-MCNC: 0.57 MG/DL (ref 0.51–0.95)
DEPRECATED HCO3 PLAS-SCNC: 23 MMOL/L (ref 22–29)
GFR SERPL CREATININE-BSD FRML MDRD: >90 ML/MIN/1.73M2
GLUCOSE SERPL-MCNC: 89 MG/DL (ref 70–99)
HDLC SERPL-MCNC: 44 MG/DL
LDLC SERPL CALC-MCNC: 200 MG/DL
NONHDLC SERPL-MCNC: 232 MG/DL
POTASSIUM SERPL-SCNC: 4.1 MMOL/L (ref 3.4–5.3)
PROT SERPL-MCNC: 7.3 G/DL (ref 6.4–8.3)
SODIUM SERPL-SCNC: 143 MMOL/L (ref 136–145)
TRIGL SERPL-MCNC: 159 MG/DL

## 2023-02-10 PROCEDURE — 80053 COMPREHEN METABOLIC PANEL: CPT | Performed by: FAMILY MEDICINE

## 2023-02-10 PROCEDURE — 86803 HEPATITIS C AB TEST: CPT | Performed by: FAMILY MEDICINE

## 2023-02-10 PROCEDURE — 99204 OFFICE O/P NEW MOD 45 MIN: CPT | Performed by: FAMILY MEDICINE

## 2023-02-10 PROCEDURE — 36415 COLL VENOUS BLD VENIPUNCTURE: CPT | Performed by: FAMILY MEDICINE

## 2023-02-10 PROCEDURE — 73562 X-RAY EXAM OF KNEE 3: CPT | Mod: TC | Performed by: RADIOLOGY

## 2023-02-10 PROCEDURE — 77067 SCR MAMMO BI INCL CAD: CPT | Mod: TC | Performed by: RADIOLOGY

## 2023-02-10 PROCEDURE — 80061 LIPID PANEL: CPT | Performed by: FAMILY MEDICINE

## 2023-02-10 PROCEDURE — 87389 HIV-1 AG W/HIV-1&-2 AB AG IA: CPT | Performed by: FAMILY MEDICINE

## 2023-02-10 RX ORDER — CELECOXIB 200 MG/1
CAPSULE ORAL
Qty: 30 CAPSULE | Refills: 4 | Status: SHIPPED | OUTPATIENT
Start: 2023-02-10 | End: 2023-05-09

## 2023-02-10 RX ORDER — OLOPATADINE HYDROCHLORIDE 1 MG/ML
1 SOLUTION/ DROPS OPHTHALMIC 2 TIMES DAILY
Qty: 5 ML | Refills: 1 | Status: SHIPPED | OUTPATIENT
Start: 2023-02-10 | End: 2024-06-17

## 2023-02-10 RX ORDER — CETIRIZINE HYDROCHLORIDE 10 MG/1
10 TABLET ORAL DAILY
Qty: 30 TABLET | Refills: 6 | Status: SHIPPED | OUTPATIENT
Start: 2023-02-10 | End: 2023-05-09

## 2023-02-10 NOTE — PROGRESS NOTES
Assessment & Plan     Screening for HIV (human immunodeficiency virus)  Patient agrees for testing today.  - HIV Antigen Antibody Combo; Future  - HIV Antigen Antibody Combo    Need for hepatitis C screening test    Patient agrees for testing  - Hepatitis C Screen Reflex to HCV RNA Quant and Genotype; Future  - Hepatitis C Screen Reflex to HCV RNA Quant and Genotype    Visit for screening mammogram    She is ready for the mammogram I explained the process will forward results to her.  - MA SCREENING DIGITAL BILAT - Future  (s+30); Future    Lumbar back pain    See experiencing lumbar back pain which is not exacerbated by activity she started a new job and it has not gotten worse she has been giving her discomfort for more than 18 months I restarted Celebrex for her  - celecoxib (CELEBREX) 200 MG capsule; [CELECOXIB (CELEBREX) 200 MG CAPSULE] TAKE 1 CAPSULE BY MOUTH DAILY Strength: 200 mg    Deafness in left ear    Audiogram report reviewed from January 2023 she is deaf in her left ear.    Pain in the wrist, left    She has been experiencing left wrist pain for about 9 months no injuries reported the pain is tender with flexion extension of the wrist the medial aspect of the left wrist is swollen she denies any falls.  I have suggested she not wrap her left wrist.  She has no associated numbness in the left hand.  Celebrex to be tried she can use an ice pack I will see her again within 4 to 5 weeks  - celecoxib (CELEBREX) 200 MG capsule; [CELECOXIB (CELEBREX) 200 MG CAPSULE] TAKE 1 CAPSULE BY MOUTH DAILY Strength: 200 mg    Right knee pain, unspecified chronicity    Swelling noted on the medial aspect of the right knee she has difficulty extending her knee.  She has difficulty going up stairs no injuries no reported the knee has been bothering her for approximately 2-1/2 months.  X-ray personally reviewed today showed no evidence of an effusion.  Joint spaces are well-preserved  - XR Knee Right 3 Views; Future  -  "celecoxib (CELEBREX) 200 MG capsule; [CELECOXIB (CELEBREX) 200 MG CAPSULE] TAKE 1 CAPSULE BY MOUTH DAILY Strength: 200 mg    Other chronic allergic conjunctivitis of both eyes  Mild conjunctival injection noted on the medial canthus of the right eye and the lateral canthus of the left eye no discharge noted felt  - olopatadine (PATANOL) 0.1 % ophthalmic solution; Place 1 drop into both eyes 2 times daily    Pure hypercholesterolemia    Cholesterols been elevated the past to repeat a lipid panel today and also repeat a CMP  - Lipid panel reflex to direct LDL Non-fasting; Future  - Comprehensive metabolic panel (BMP + Alb, Alk Phos, ALT, AST, Total. Bili, TP); Future  - Lipid panel reflex to direct LDL Non-fasting  - Comprehensive metabolic panel (BMP + Alb, Alk Phos, ALT, AST, Total. Bili, TP)    Allergic rhinitis    Has a runny nose I refilled her cetirizineruritic disorder        Seasonal allergic rhinitis, unspecified trigger    She has seasonal allergies of an unknown focus of which gives her sneezing attacks runny nose and  - cetirizine (ZYRTEC) 10 MG tablet; Take 1 tablet (10 mg) by mouth daily             BMI:   Estimated body mass index is 29.76 kg/m  as calculated from the following:    Height as of this encounter: 1.41 m (4' 7.51\").    Weight as of this encounter: 59.2 kg (130 lb 7 oz).           Return in about 6 weeks (around 3/24/2023) for Routine preventive.    MD ENEDINA Herrera [facility] ZANA Hall is a 52 year old, presenting for the following health issues:  No chief complaint on file.  52-year-old female here for follow-up I have not seen her for more than 2-1/2 years.  She reports that she is having back pain and that she is having itching in her eyes.  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  She says she has not tried anything for her left wrist except putting a brace on it.  No medications been tried she limps on her right knee but not denies injuring it or falling on it.  She sometimes will hear a click when she tries to lift up her right leg.  Her back pain remains about the same she has not taken any medication it stiff at the end of the day.  She says her eyes are occasionally itchy she is also noticed that she has a runny nose and coughing she says sometimes she develops a small rash in different parts of her body that makes it very itchy for her especially at night    History of Present Illness       Reason for visit:  Back pain               Review of Systems   Constitutional, HEENT, cardiovascular, pulmonary, GI, , musculoskeletal, neuro, skin, endocrine and psych systems are negative, except as otherwise noted.      Objective    BP (!) 150/88   Pulse 58   Temp 98  F (36.7  C) (Oral)   Resp 16   Ht 1.41 m (4' 7.51\")   Wt 59.2 kg (130 lb 7 oz)   LMP  (LMP Unknown)   SpO2 97%   BMI 29.76 kg/m    Body mass index is 29.76 kg/m .  Physical Exam   GENERAL: healthy, alert and no distress  EYES: Eyes grossly normal to inspection with bilateral conjunctival injection noted  HENT: ear canals and TM's normal, nose and mouth without ulcers or lesions  NECK: no adenopathy, no asymmetry, masses, or scars and thyroid normal to palpation  RESP: lungs clear to auscultation - no rales, rhonchi or wheezes  CV: regular rate and rhythm, normal S1 S2, no S3 or S4, no murmur, click or rub, no peripheral edema and peripheral pulses strong  ABDOMEN: soft, nontender, no hepatosplenomegaly, no masses and bowel sounds normal  MS: {Tenderness elicited when I palpate her lumbar spine from L2-L3.  Forward flexion extension of the spine produced no added discomfort  Tenderness noted on the medial aspect of the right knee she has tenderness over the superior aspect of right patella.  " Extension of the right knee beyond 30 degrees produces an audible click.  She has discomfort while flexing her right knee.  Angela's tests are negative Lachman's test negative.  Drawer test negative.  SKIN: no suspicious lesions or rashes  NEURO: Normal strength and tone, mentation intact and speech normal  PSYCH: mentation appears normal, affect normal/bright        Pulm time spent in coordination of care today was 40 minutes

## 2023-02-11 LAB
HCV AB SERPL QL IA: NONREACTIVE
HIV 1+2 AB+HIV1 P24 AG SERPL QL IA: NONREACTIVE

## 2023-05-09 ENCOUNTER — OFFICE VISIT (OUTPATIENT)
Dept: FAMILY MEDICINE | Facility: CLINIC | Age: 53
End: 2023-05-09
Payer: COMMERCIAL

## 2023-05-09 VITALS
BODY MASS INDEX: 29.33 KG/M2 | SYSTOLIC BLOOD PRESSURE: 184 MMHG | RESPIRATION RATE: 16 BRPM | OXYGEN SATURATION: 99 % | HEART RATE: 52 BPM | HEIGHT: 56 IN | DIASTOLIC BLOOD PRESSURE: 99 MMHG | TEMPERATURE: 98.1 F | WEIGHT: 130.38 LBS

## 2023-05-09 DIAGNOSIS — R03.0 ELEVATED BLOOD PRESSURE READING WITHOUT DIAGNOSIS OF HYPERTENSION: ICD-10-CM

## 2023-05-09 DIAGNOSIS — M25.532 PAIN IN THE WRIST, LEFT: ICD-10-CM

## 2023-05-09 DIAGNOSIS — M25.561 RIGHT KNEE PAIN, UNSPECIFIED CHRONICITY: ICD-10-CM

## 2023-05-09 DIAGNOSIS — J30.2 SEASONAL ALLERGIC RHINITIS, UNSPECIFIED TRIGGER: Primary | ICD-10-CM

## 2023-05-09 DIAGNOSIS — M54.50 LUMBAR BACK PAIN: ICD-10-CM

## 2023-05-09 DIAGNOSIS — E78.00 PURE HYPERCHOLESTEROLEMIA: ICD-10-CM

## 2023-05-09 PROCEDURE — 99214 OFFICE O/P EST MOD 30 MIN: CPT | Performed by: FAMILY MEDICINE

## 2023-05-09 RX ORDER — CELECOXIB 200 MG/1
CAPSULE ORAL
Qty: 30 CAPSULE | Refills: 4 | Status: SHIPPED | OUTPATIENT
Start: 2023-05-09 | End: 2023-05-23

## 2023-05-09 RX ORDER — CETIRIZINE HYDROCHLORIDE 10 MG/1
10 TABLET ORAL DAILY
Qty: 30 TABLET | Refills: 6 | Status: SHIPPED | OUTPATIENT
Start: 2023-05-09 | End: 2024-06-17

## 2023-05-09 RX ORDER — LORATADINE 10 MG/1
10 TABLET ORAL DAILY
Qty: 30 TABLET | Refills: 3 | Status: SHIPPED | OUTPATIENT
Start: 2023-05-09 | End: 2024-06-17

## 2023-05-09 ASSESSMENT — ENCOUNTER SYMPTOMS
WHEEZING: 1
COUGH: 1

## 2023-05-09 NOTE — PROGRESS NOTES
"  Assessment & Plan     Seasonal allergic rhinitis, unspecified trigger    Ports that she still has a very itchy throat but sometimes it is hard for her to swallow increasing antihistamine dosage with cetirizine at night and loratadine in the daytime she understands that these medications can make her mouth dry.  - cetirizine (ZYRTEC) 10 MG tablet; Take 1 tablet (10 mg) by mouth daily  - loratadine (CLARITIN) 10 MG tablet; Take 1 tablet (10 mg) by mouth daily    Lumbar back pain    Improvement in back pain with Celebrex.  - celecoxib (CELEBREX) 200 MG capsule; [CELECOXIB (CELEBREX) 200 MG CAPSULE] TAKE 1 CAPSULE BY MOUTH DAILY Strength: 200 mg    Pain in the wrist, left    No wrist pain noted today  - celecoxib (CELEBREX) 200 MG capsule; [CELECOXIB (CELEBREX) 200 MG CAPSULE] TAKE 1 CAPSULE BY MOUTH DAILY Strength: 200 mg    Right knee pain, unspecified chronicity    Right knee appears improved  - celecoxib (CELEBREX) 200 MG capsule; [CELECOXIB (CELEBREX) 200 MG CAPSULE] TAKE 1 CAPSULE BY MOUTH DAILY Strength: 200 mg    Pure hypercholesterolemia    We discussed her cholesterol issues    Elevated blood pressure reading without diagnosis of hypertension    Blood pressure was found to be elevated she will return in 2 weeks to recheck the blood pressure               BMI:   Estimated body mass index is 29.75 kg/m  as calculated from the following:    Height as of this encounter: 1.41 m (4' 7.51\").    Weight as of this encounter: 59.1 kg (130 lb 6 oz).           Jerad Knight MD  [facility] ZANA Hall is a 52 year old, presenting for the following health issues:  Cough and Allergies (Itchy throat )        5/9/2023    10:53 AM   Additional Questions   Roomed by Charis Acosta   Accompanied by Self     Cough  Associated symptoms include wheezing.   Allergies  Associated symptoms include coughing.                Review of Systems   Respiratory: Positive for cough and wheezing.     " "  Constitutional, HEENT, cardiovascular, pulmonary, gi and gu systems are negative, except as otherwise noted.      Objective    BP (!) 184/99   Pulse 52   Temp 98.1  F (36.7  C) (Oral)   Resp 16   Ht 1.41 m (4' 7.51\")   Wt 59.1 kg (130 lb 6 oz)   LMP  (LMP Unknown)   SpO2 99%   BMI 29.75 kg/m    Body mass index is 29.75 kg/m .  Physical Exam   GENERAL: healthy, alert and no distress  EYES: Eyes grossly normal to inspection, PERRL and conjunctivae and sclerae normal  HENT: ear canals and TM's normal, nose and mouth without ulcers or lesions  NECK: no adenopathy, no asymmetry, masses, or scars and thyroid normal to palpation  RESP: lungs clear to auscultation - no rales, rhonchi or wheezes  CV: regular rate and rhythm, normal S1 S2, no S3 or S4, no murmur, click or rub, no peripheral edema and peripheral pulses strong  ABDOMEN: soft, nontender, no hepatosplenomegaly, no masses and bowel sounds normal  MS: no gross musculoskeletal defects noted, no edema  SKIN: no suspicious lesions or rashes  NEURO: Normal strength and tone, mentation intact and speech normal  PSYCH: mentation appears normal, affect normal/bright                "

## 2023-05-23 ENCOUNTER — OFFICE VISIT (OUTPATIENT)
Dept: FAMILY MEDICINE | Facility: CLINIC | Age: 53
End: 2023-05-23
Payer: COMMERCIAL

## 2023-05-23 VITALS
BODY MASS INDEX: 29.51 KG/M2 | WEIGHT: 131.19 LBS | OXYGEN SATURATION: 98 % | HEIGHT: 56 IN | SYSTOLIC BLOOD PRESSURE: 160 MMHG | HEART RATE: 62 BPM | DIASTOLIC BLOOD PRESSURE: 88 MMHG | TEMPERATURE: 97.8 F | RESPIRATION RATE: 16 BRPM

## 2023-05-23 DIAGNOSIS — I10 ESSENTIAL HYPERTENSION: ICD-10-CM

## 2023-05-23 DIAGNOSIS — J30.2 SEASONAL ALLERGIC RHINITIS, UNSPECIFIED TRIGGER: Primary | ICD-10-CM

## 2023-05-23 DIAGNOSIS — J39.2 THROAT IRRITATION: ICD-10-CM

## 2023-05-23 DIAGNOSIS — M54.50 LUMBAR BACK PAIN: ICD-10-CM

## 2023-05-23 DIAGNOSIS — L29.9 ITCHY SKIN: ICD-10-CM

## 2023-05-23 DIAGNOSIS — M25.561 RIGHT KNEE PAIN, UNSPECIFIED CHRONICITY: ICD-10-CM

## 2023-05-23 DIAGNOSIS — M25.532 PAIN IN THE WRIST, LEFT: ICD-10-CM

## 2023-05-23 PROCEDURE — 99214 OFFICE O/P EST MOD 30 MIN: CPT | Performed by: FAMILY MEDICINE

## 2023-05-23 RX ORDER — MONTELUKAST SODIUM 10 MG/1
10 TABLET ORAL AT BEDTIME
Qty: 30 TABLET | Refills: 3 | Status: SHIPPED | OUTPATIENT
Start: 2023-05-23 | End: 2024-06-17

## 2023-05-23 RX ORDER — LOSARTAN POTASSIUM 25 MG/1
25 TABLET ORAL DAILY
Qty: 30 TABLET | Refills: 3 | Status: SHIPPED | OUTPATIENT
Start: 2023-05-23 | End: 2024-06-17

## 2023-05-23 RX ORDER — CELECOXIB 200 MG/1
CAPSULE ORAL
Qty: 30 CAPSULE | Refills: 4 | Status: SHIPPED | OUTPATIENT
Start: 2023-05-23 | End: 2024-06-17

## 2023-05-23 NOTE — PROGRESS NOTES
"  Assessment & Plan     Seasonal allergic rhinitis, unspecified trigger  Patient has a history that seems significant for allergies.  Despite taking antihistamine she continues to have irritation in her throat and runny nose itchy eyes.  She has never had the symptoms before.  She denies any wheezing.    Itchy skin    She can continue her cetirizine until the end of this month and I have started on montelukast she understands the side effects of this medication she understands that she should take this medication daily.  - montelukast (SINGULAIR) 10 MG tablet; Take 1 tablet (10 mg) by mouth At Bedtime    Throat irritation      - montelukast (SINGULAIR) 10 MG tablet; Take 1 tablet (10 mg) by mouth At Bedtime    Right knee pain, unspecified chronicity    Right knee pain is improved no side effects noted with Celebrex to walk without discomfort  - celecoxib (CELEBREX) 200 MG capsule; [CELECOXIB (CELEBREX) 200 MG CAPSULE] TAKE 1 CAPSULE BY MOUTH DAILY Strength: 200 mg    Essential hypertension    Blood pressure is elevated this is a second visit where was grossly elevated she was mentioning that sometimes she feels tired at home I informed her that she does have hypertension.  Starting Cozaar side effects discussed follow-up in 4 weeks  - losartan (COZAAR) 25 MG tablet; Take 1 tablet (25 mg) by mouth daily    Lumbar back pain    No back pain noted today  - celecoxib (CELEBREX) 200 MG capsule; [CELECOXIB (CELEBREX) 200 MG CAPSULE] TAKE 1 CAPSULE BY MOUTH DAILY Strength: 200 mg    Pain in the wrist, left    No left wrist pain noted today  - celecoxib (CELEBREX) 200 MG capsule; [CELECOXIB (CELEBREX) 200 MG CAPSULE] TAKE 1 CAPSULE BY MOUTH DAILY Strength: 200 mg    0956}     BMI:   Estimated body mass index is 29.93 kg/m  as calculated from the following:    Height as of this encounter: 1.41 m (4' 7.51\").    Weight as of this encounter: 59.5 kg (131 lb 3 oz).           Jerad Knight MD  [facility] " "ZANA Hall is a 52 year old, presenting for the following health issues:  Allergies        5/23/2023     9:24 AM   Additional Questions   Roomed by Charis Acosta   Accompanied by Doris     HPI   52-year-old lady here for follow-up.  She notes that the allergy medicine seem to help for about 5 days and then she noticed that she was having itching in her throat and on her skin she states that the medication for her back wrist and knee pain is helped and she is taking it daily.  She says that sometimes she has a headache and feels dizzy and she is wondering if it has something to do with her blood pressure.          Review of Systems   Constitutional, HEENT, cardiovascular, pulmonary, gi and gu systems are negative, except as otherwise noted.      Objective    BP (!) 176/106 (BP Location: Left arm, Patient Position: Sitting, Cuff Size: Adult Regular)   Pulse 62   Temp 97.8  F (36.6  C) (Oral)   Resp 16   Ht 1.41 m (4' 7.51\")   Wt 59.5 kg (131 lb 3 oz)   LMP  (LMP Unknown)   SpO2 98%   BMI 29.93 kg/m    Body mass index is 29.93 kg/m .  Physical Exam   GENERAL: healthy, alert and no distress  EYES: Eyes grossly normal to inspection, PERRL and conjunctivae and sclerae normal  NECK: no adenopathy, no asymmetry, masses, or scars and thyroid normal to palpation  RESP: lungs clear to auscultation - no rales, rhonchi or wheezes  CV: regular rate and rhythm, normal S1 S2, no S3 or S4, no murmur, click or rub, no peripheral edema and peripheral pulses strong  MS: no gross musculoskeletal defects noted, no edema  SKIN: no suspicious lesions or rashes  NEURO: Normal strength and tone, mentation intact and speech normal  PSYCH: mentation appears normal, affect normal/bright                "

## 2023-07-18 ENCOUNTER — ANCILLARY PROCEDURE (OUTPATIENT)
Dept: GENERAL RADIOLOGY | Facility: CLINIC | Age: 53
End: 2023-07-18
Attending: FAMILY MEDICINE
Payer: COMMERCIAL

## 2023-07-18 ENCOUNTER — OFFICE VISIT (OUTPATIENT)
Dept: FAMILY MEDICINE | Facility: CLINIC | Age: 53
End: 2023-07-18
Payer: COMMERCIAL

## 2023-07-18 VITALS
HEIGHT: 56 IN | WEIGHT: 128 LBS | RESPIRATION RATE: 20 BRPM | TEMPERATURE: 98.2 F | SYSTOLIC BLOOD PRESSURE: 147 MMHG | BODY MASS INDEX: 28.79 KG/M2 | HEART RATE: 43 BPM | DIASTOLIC BLOOD PRESSURE: 84 MMHG | OXYGEN SATURATION: 99 %

## 2023-07-18 DIAGNOSIS — M79.89 RIGHT LEG SWELLING: Primary | ICD-10-CM

## 2023-07-18 DIAGNOSIS — Z12.11 SCREEN FOR COLON CANCER: ICD-10-CM

## 2023-07-18 DIAGNOSIS — R03.0 ELEVATED BP WITHOUT DIAGNOSIS OF HYPERTENSION: ICD-10-CM

## 2023-07-18 DIAGNOSIS — M79.89 RIGHT LEG SWELLING: ICD-10-CM

## 2023-07-18 DIAGNOSIS — E78.5 HYPERLIPIDEMIA LDL GOAL <130: ICD-10-CM

## 2023-07-18 LAB
ALBUMIN SERPL BCG-MCNC: 4.4 G/DL (ref 3.5–5.2)
ALP SERPL-CCNC: 89 U/L (ref 35–104)
ALT SERPL W P-5'-P-CCNC: 20 U/L (ref 0–50)
ANION GAP SERPL CALCULATED.3IONS-SCNC: 10 MMOL/L (ref 7–15)
AST SERPL W P-5'-P-CCNC: 26 U/L (ref 0–45)
BASOPHILS # BLD AUTO: 0 10E3/UL (ref 0–0.2)
BASOPHILS NFR BLD AUTO: 0 %
BILIRUB SERPL-MCNC: 0.5 MG/DL
BUN SERPL-MCNC: 8.2 MG/DL (ref 6–20)
CALCIUM SERPL-MCNC: 9.4 MG/DL (ref 8.6–10)
CHLORIDE SERPL-SCNC: 107 MMOL/L (ref 98–107)
CREAT SERPL-MCNC: 0.67 MG/DL (ref 0.51–0.95)
DEPRECATED HCO3 PLAS-SCNC: 26 MMOL/L (ref 22–29)
EOSINOPHIL # BLD AUTO: 0.2 10E3/UL (ref 0–0.7)
EOSINOPHIL NFR BLD AUTO: 4 %
ERYTHROCYTE [DISTWIDTH] IN BLOOD BY AUTOMATED COUNT: 14.5 % (ref 10–15)
GFR SERPL CREATININE-BSD FRML MDRD: >90 ML/MIN/1.73M2
GLUCOSE SERPL-MCNC: 89 MG/DL (ref 70–99)
HCT VFR BLD AUTO: 45 % (ref 35–47)
HGB BLD-MCNC: 14.3 G/DL (ref 11.7–15.7)
IMM GRANULOCYTES # BLD: 0 10E3/UL
IMM GRANULOCYTES NFR BLD: 0 %
LYMPHOCYTES # BLD AUTO: 1.2 10E3/UL (ref 0.8–5.3)
LYMPHOCYTES NFR BLD AUTO: 26 %
MCH RBC QN AUTO: 27.4 PG (ref 26.5–33)
MCHC RBC AUTO-ENTMCNC: 31.8 G/DL (ref 31.5–36.5)
MCV RBC AUTO: 86 FL (ref 78–100)
MONOCYTES # BLD AUTO: 0.3 10E3/UL (ref 0–1.3)
MONOCYTES NFR BLD AUTO: 7 %
NEUTROPHILS # BLD AUTO: 2.9 10E3/UL (ref 1.6–8.3)
NEUTROPHILS NFR BLD AUTO: 63 %
PLATELET # BLD AUTO: 303 10E3/UL (ref 150–450)
POTASSIUM SERPL-SCNC: 4.5 MMOL/L (ref 3.4–5.3)
PROT SERPL-MCNC: 7.9 G/DL (ref 6.4–8.3)
RBC # BLD AUTO: 5.21 10E6/UL (ref 3.8–5.2)
SODIUM SERPL-SCNC: 143 MMOL/L (ref 136–145)
WBC # BLD AUTO: 4.6 10E3/UL (ref 4–11)

## 2023-07-18 PROCEDURE — 36415 COLL VENOUS BLD VENIPUNCTURE: CPT | Performed by: FAMILY MEDICINE

## 2023-07-18 PROCEDURE — 85025 COMPLETE CBC W/AUTO DIFF WBC: CPT | Performed by: FAMILY MEDICINE

## 2023-07-18 PROCEDURE — 80053 COMPREHEN METABOLIC PANEL: CPT | Performed by: FAMILY MEDICINE

## 2023-07-18 PROCEDURE — 99215 OFFICE O/P EST HI 40 MIN: CPT | Performed by: FAMILY MEDICINE

## 2023-07-18 PROCEDURE — 73610 X-RAY EXAM OF ANKLE: CPT | Mod: TC | Performed by: RADIOLOGY

## 2023-07-18 NOTE — PROGRESS NOTES
"  Assessment & Plan     Right leg swelling  She talks about this like it is dramatic, but I found it very mild. Labs came out normal and xrays, too. I do not know her, and perhaps this is a chronic complaint of hers. This will have to be followed  I asked her to return to the office room where I saw her so I could discuss her labs and xray results. She never returned, so I was unable to wrap up the visit.  - CBC with platelets and differential  - Comprehensive metabolic panel (BMP + Alb, Alk Phos, ALT, AST, Total. Bili, TP)  - CBC with platelets and differential  - Comprehensive metabolic panel (BMP + Alb, Alk Phos, ALT, AST, Total. Bili, TP)  - XR Ankle Right G/E 3 Views  - XR Tibia and Fibula Right 2 Views    Hyperlipidemia -  This requires follow up and treatment.     High BP - if this persists, it will need medical management.    Screen for colon cancer  Ordered cologuard      Review of external notes as documented elsewhere in note  Ordering of each unique test  Prescription drug management  60 minutes spent by me on the date of the encounter doing chart review, history and exam, documentation and further activities per the note     BMI:   Estimated body mass index is 29.22 kg/m  as calculated from the following:    Height as of this encounter: 1.41 m (4' 7.5\").    Weight as of this encounter: 58.1 kg (128 lb).   Weight management plan: Discussed healthy diet and exercise guidelines    Nadine Dykes MD  [facility]                : Ra Hall is a 53 year old, presenting for the following health issues:  Leg Swelling (Right lower leg swelling, and c/o ache up to hip x 3 weeks)        7/18/2023    10:26 AM   Additional Questions   Roomed by Gladys CLAYTON   Right leg is swollen to the butt  2-3 weeks  Pain from above the ankle all the way to the butt  The main pain is to the side of the center pain    The swelling started initially in the right knee. She saw Dr." "Eugene in May 2023 - he gave a med and the swelling went away.     Right foot has never swollen.    I stand 12 hrs at work. Early morning to mid-day at a [employer] business. I wear socks and I steel toed boots.    Left leg is fine. Nothing wrong.         Review of Systems         Objective    BP (!) 162/81   Pulse (!) 43   Temp 98.2  F (36.8  C) (Oral)   Resp 20   Ht 1.41 m (4' 7.5\")   Wt 58.1 kg (128 lb)   LMP  (LMP Unknown)   SpO2 99%   BMI 29.22 kg/m    Body mass index is 29.22 kg/m .  Physical Exam             Xray: no bone problem - specks in muscle - await radiology note      "

## 2023-07-23 ENCOUNTER — LAB (OUTPATIENT)
Dept: FAMILY MEDICINE | Facility: CLINIC | Age: 53
End: 2023-07-23
Payer: COMMERCIAL

## 2023-07-23 DIAGNOSIS — Z12.11 SCREEN FOR COLON CANCER: ICD-10-CM

## 2024-03-08 ENCOUNTER — OFFICE VISIT (OUTPATIENT)
Dept: FAMILY MEDICINE | Facility: CLINIC | Age: 54
End: 2024-03-08
Payer: COMMERCIAL

## 2024-03-08 VITALS
WEIGHT: 128.08 LBS | RESPIRATION RATE: 18 BRPM | OXYGEN SATURATION: 97 % | TEMPERATURE: 98.1 F | SYSTOLIC BLOOD PRESSURE: 122 MMHG | HEIGHT: 56 IN | BODY MASS INDEX: 28.81 KG/M2 | DIASTOLIC BLOOD PRESSURE: 76 MMHG | HEART RATE: 59 BPM

## 2024-03-08 DIAGNOSIS — Z53.21 PATIENT LEFT WITHOUT BEING SEEN: Primary | ICD-10-CM

## 2024-03-08 PROCEDURE — 99207 PR NO CHG PAT LEFT NOT BEING SEEN: CPT | Performed by: FAMILY MEDICINE

## 2024-03-08 ASSESSMENT — ENCOUNTER SYMPTOMS: COUGH: 1

## 2024-03-08 NOTE — PROGRESS NOTES
"  Assessment & Plan     Patient left without being seen                BMI  Estimated body mass index is 28.81 kg/m  as calculated from the following:    Height as of this encounter: 1.42 m (4' 7.91\").    Weight as of this encounter: 58.1 kg (128 lb 1.3 oz).             Sourav Hall is a 53 year old, presenting for the following health issues:  Cough, Itchy throat, running nose, and Breathing Problem (Shortness of breath once in a while. )        3/8/2024    11:33 AM   Additional Questions   Roomed by Michael beard   Accompanied by self   Failed to redirect to the Timeline version of the [employer] SmartLink.  History of Present Illness       Reason for visit:  Itchy throat, running nose  Symptom onset:  More than a month                      Objective    /76   Pulse 59   Temp 98.1  F (36.7  C) (Oral)   Resp 18   Ht 1.42 m (4' 7.91\")   Wt 58.1 kg (128 lb 1.3 oz)   LMP  (LMP Unknown)   SpO2 97%   BMI 28.81 kg/m    Body mass index is 28.81 kg/m .  Physical Exam               Signed Electronically by: Abhi Oneil MD    "

## 2024-06-17 ENCOUNTER — APPOINTMENT (OUTPATIENT)
Dept: RADIOLOGY | Facility: HOSPITAL | Age: 54
End: 2024-06-17
Attending: STUDENT IN AN ORGANIZED HEALTH CARE EDUCATION/TRAINING PROGRAM
Payer: COMMERCIAL

## 2024-06-17 ENCOUNTER — APPOINTMENT (OUTPATIENT)
Dept: CT IMAGING | Facility: HOSPITAL | Age: 54
End: 2024-06-17
Attending: STUDENT IN AN ORGANIZED HEALTH CARE EDUCATION/TRAINING PROGRAM
Payer: COMMERCIAL

## 2024-06-17 ENCOUNTER — HOSPITAL ENCOUNTER (INPATIENT)
Facility: HOSPITAL | Age: 54
LOS: 1 days | Discharge: HOME HEALTH | End: 2024-06-19
Attending: STUDENT IN AN ORGANIZED HEALTH CARE EDUCATION/TRAINING PROGRAM | Admitting: FAMILY MEDICINE
Payer: COMMERCIAL

## 2024-06-17 DIAGNOSIS — R53.1 LEFT-SIDED WEAKNESS: ICD-10-CM

## 2024-06-17 DIAGNOSIS — I63.9 MULTIPLE CEREBRAL INFARCTIONS (H): ICD-10-CM

## 2024-06-17 DIAGNOSIS — I67.2 INTRACRANIAL ATHEROSCLEROSIS: Primary | ICD-10-CM

## 2024-06-17 LAB
ALBUMIN SERPL BCG-MCNC: 4.4 G/DL (ref 3.5–5.2)
ALP SERPL-CCNC: 90 U/L (ref 40–150)
ALT SERPL W P-5'-P-CCNC: 26 U/L (ref 0–50)
ANION GAP SERPL CALCULATED.3IONS-SCNC: 11 MMOL/L (ref 7–15)
AST SERPL W P-5'-P-CCNC: 23 U/L (ref 0–45)
BASOPHILS # BLD AUTO: 0 10E3/UL (ref 0–0.2)
BASOPHILS NFR BLD AUTO: 1 %
BILIRUB SERPL-MCNC: 0.4 MG/DL
BUN SERPL-MCNC: 11.5 MG/DL (ref 6–20)
CALCIUM SERPL-MCNC: 9.6 MG/DL (ref 8.6–10)
CHLORIDE SERPL-SCNC: 105 MMOL/L (ref 98–107)
CK SERPL-CCNC: 106 U/L (ref 26–192)
CREAT SERPL-MCNC: 0.6 MG/DL (ref 0.51–0.95)
DEPRECATED HCO3 PLAS-SCNC: 24 MMOL/L (ref 22–29)
EGFRCR SERPLBLD CKD-EPI 2021: >90 ML/MIN/1.73M2
EOSINOPHIL # BLD AUTO: 0.1 10E3/UL (ref 0–0.7)
EOSINOPHIL NFR BLD AUTO: 3 %
ERYTHROCYTE [DISTWIDTH] IN BLOOD BY AUTOMATED COUNT: 12.4 % (ref 10–15)
GLUCOSE SERPL-MCNC: 92 MG/DL (ref 70–99)
HCT VFR BLD AUTO: 45.1 % (ref 35–47)
HGB BLD-MCNC: 14.8 G/DL (ref 11.7–15.7)
IMM GRANULOCYTES # BLD: 0 10E3/UL
IMM GRANULOCYTES NFR BLD: 0 %
LYMPHOCYTES # BLD AUTO: 1.4 10E3/UL (ref 0.8–5.3)
LYMPHOCYTES NFR BLD AUTO: 28 %
MCH RBC QN AUTO: 29.1 PG (ref 26.5–33)
MCHC RBC AUTO-ENTMCNC: 32.8 G/DL (ref 31.5–36.5)
MCV RBC AUTO: 89 FL (ref 78–100)
MONOCYTES # BLD AUTO: 0.4 10E3/UL (ref 0–1.3)
MONOCYTES NFR BLD AUTO: 8 %
NEUTROPHILS # BLD AUTO: 3.1 10E3/UL (ref 1.6–8.3)
NEUTROPHILS NFR BLD AUTO: 61 %
NRBC # BLD AUTO: 0 10E3/UL
NRBC BLD AUTO-RTO: 0 /100
PLATELET # BLD AUTO: 266 10E3/UL (ref 150–450)
POTASSIUM SERPL-SCNC: 4.3 MMOL/L (ref 3.4–5.3)
PROT SERPL-MCNC: 8.6 G/DL (ref 6.4–8.3)
RBC # BLD AUTO: 5.09 10E6/UL (ref 3.8–5.2)
SODIUM SERPL-SCNC: 140 MMOL/L (ref 135–145)
WBC # BLD AUTO: 5 10E3/UL (ref 4–11)

## 2024-06-17 PROCEDURE — 73070 X-RAY EXAM OF ELBOW: CPT | Mod: LT

## 2024-06-17 PROCEDURE — 96374 THER/PROPH/DIAG INJ IV PUSH: CPT | Mod: 59

## 2024-06-17 PROCEDURE — 73502 X-RAY EXAM HIP UNI 2-3 VIEWS: CPT

## 2024-06-17 PROCEDURE — 70496 CT ANGIOGRAPHY HEAD: CPT

## 2024-06-17 PROCEDURE — 99285 EMERGENCY DEPT VISIT HI MDM: CPT | Mod: 25

## 2024-06-17 PROCEDURE — 82550 ASSAY OF CK (CPK): CPT | Performed by: STUDENT IN AN ORGANIZED HEALTH CARE EDUCATION/TRAINING PROGRAM

## 2024-06-17 PROCEDURE — 73560 X-RAY EXAM OF KNEE 1 OR 2: CPT | Mod: LT

## 2024-06-17 PROCEDURE — 73030 X-RAY EXAM OF SHOULDER: CPT | Mod: LT

## 2024-06-17 PROCEDURE — 83036 HEMOGLOBIN GLYCOSYLATED A1C: CPT | Performed by: FAMILY MEDICINE

## 2024-06-17 PROCEDURE — 84484 ASSAY OF TROPONIN QUANT: CPT | Performed by: FAMILY MEDICINE

## 2024-06-17 PROCEDURE — 80053 COMPREHEN METABOLIC PANEL: CPT | Performed by: STUDENT IN AN ORGANIZED HEALTH CARE EDUCATION/TRAINING PROGRAM

## 2024-06-17 PROCEDURE — 85004 AUTOMATED DIFF WBC COUNT: CPT | Performed by: STUDENT IN AN ORGANIZED HEALTH CARE EDUCATION/TRAINING PROGRAM

## 2024-06-17 PROCEDURE — 36415 COLL VENOUS BLD VENIPUNCTURE: CPT | Performed by: STUDENT IN AN ORGANIZED HEALTH CARE EDUCATION/TRAINING PROGRAM

## 2024-06-17 PROCEDURE — 250N000011 HC RX IP 250 OP 636: Mod: JZ | Performed by: STUDENT IN AN ORGANIZED HEALTH CARE EDUCATION/TRAINING PROGRAM

## 2024-06-17 RX ORDER — IOPAMIDOL 755 MG/ML
67 INJECTION, SOLUTION INTRAVASCULAR ONCE
Status: COMPLETED | OUTPATIENT
Start: 2024-06-17 | End: 2024-06-17

## 2024-06-17 RX ORDER — KETOROLAC TROMETHAMINE 30 MG/ML
60 INJECTION, SOLUTION INTRAMUSCULAR; INTRAVENOUS ONCE
Status: DISCONTINUED | OUTPATIENT
Start: 2024-06-17 | End: 2024-06-17

## 2024-06-17 RX ORDER — KETOROLAC TROMETHAMINE 30 MG/ML
30 INJECTION, SOLUTION INTRAMUSCULAR; INTRAVENOUS ONCE
Status: COMPLETED | OUTPATIENT
Start: 2024-06-17 | End: 2024-06-17

## 2024-06-17 RX ADMIN — IOPAMIDOL 67 ML: 755 INJECTION, SOLUTION INTRAVENOUS at 21:49

## 2024-06-17 RX ADMIN — KETOROLAC TROMETHAMINE 30 MG: 30 INJECTION, SOLUTION INTRAMUSCULAR at 20:57

## 2024-06-17 ASSESSMENT — ENCOUNTER SYMPTOMS
BACK PAIN: 0
NECK PAIN: 0

## 2024-06-17 ASSESSMENT — ACTIVITIES OF DAILY LIVING (ADL)
ADLS_ACUITY_SCORE: 33
ADLS_ACUITY_SCORE: 35

## 2024-06-18 ENCOUNTER — APPOINTMENT (OUTPATIENT)
Dept: CARDIOLOGY | Facility: HOSPITAL | Age: 54
End: 2024-06-18
Attending: FAMILY MEDICINE
Payer: COMMERCIAL

## 2024-06-18 ENCOUNTER — APPOINTMENT (OUTPATIENT)
Dept: PHYSICAL THERAPY | Facility: HOSPITAL | Age: 54
End: 2024-06-18
Attending: FAMILY MEDICINE
Payer: COMMERCIAL

## 2024-06-18 ENCOUNTER — APPOINTMENT (OUTPATIENT)
Dept: OCCUPATIONAL THERAPY | Facility: HOSPITAL | Age: 54
End: 2024-06-18
Attending: FAMILY MEDICINE
Payer: COMMERCIAL

## 2024-06-18 ENCOUNTER — APPOINTMENT (OUTPATIENT)
Dept: MRI IMAGING | Facility: HOSPITAL | Age: 54
End: 2024-06-18
Attending: STUDENT IN AN ORGANIZED HEALTH CARE EDUCATION/TRAINING PROGRAM
Payer: COMMERCIAL

## 2024-06-18 PROBLEM — R53.1 LEFT-SIDED WEAKNESS: Status: ACTIVE | Noted: 2024-06-18

## 2024-06-18 PROBLEM — I63.9: Status: ACTIVE | Noted: 2024-06-18

## 2024-06-18 LAB
ATRIAL RATE - MUSE: 58 BPM
CHOLEST SERPL-MCNC: 263 MG/DL
DIASTOLIC BLOOD PRESSURE - MUSE: NORMAL MMHG
GLUCOSE BLDC GLUCOMTR-MCNC: 102 MG/DL (ref 70–99)
GLUCOSE BLDC GLUCOMTR-MCNC: 121 MG/DL (ref 70–99)
GLUCOSE BLDC GLUCOMTR-MCNC: 82 MG/DL (ref 70–99)
GLUCOSE BLDC GLUCOMTR-MCNC: 88 MG/DL (ref 70–99)
GLUCOSE BLDC GLUCOMTR-MCNC: 95 MG/DL (ref 70–99)
HBA1C MFR BLD: 5.8 %
HDLC SERPL-MCNC: 47 MG/DL
HOLD SPECIMEN: NORMAL
INTERPRETATION ECG - MUSE: NORMAL
LDLC SERPL CALC-MCNC: 194 MG/DL
LVEF ECHO: NORMAL
NONHDLC SERPL-MCNC: 216 MG/DL
P AXIS - MUSE: 50 DEGREES
PR INTERVAL - MUSE: 168 MS
QRS DURATION - MUSE: 98 MS
QT - MUSE: 458 MS
QTC - MUSE: 449 MS
R AXIS - MUSE: 4 DEGREES
SYSTOLIC BLOOD PRESSURE - MUSE: NORMAL MMHG
T AXIS - MUSE: 28 DEGREES
TRIGL SERPL-MCNC: 109 MG/DL
TROPONIN T SERPL HS-MCNC: <6 NG/L
VENTRICULAR RATE- MUSE: 58 BPM

## 2024-06-18 PROCEDURE — 97530 THERAPEUTIC ACTIVITIES: CPT | Mod: GP

## 2024-06-18 PROCEDURE — A9585 GADOBUTROL INJECTION: HCPCS | Mod: JZ | Performed by: EMERGENCY MEDICINE

## 2024-06-18 PROCEDURE — 97166 OT EVAL MOD COMPLEX 45 MIN: CPT | Mod: GO

## 2024-06-18 PROCEDURE — 255N000002 HC RX 255 OP 636: Mod: JZ | Performed by: EMERGENCY MEDICINE

## 2024-06-18 PROCEDURE — 93306 TTE W/DOPPLER COMPLETE: CPT | Mod: 26 | Performed by: INTERNAL MEDICINE

## 2024-06-18 PROCEDURE — 82962 GLUCOSE BLOOD TEST: CPT

## 2024-06-18 PROCEDURE — 97116 GAIT TRAINING THERAPY: CPT | Mod: GP

## 2024-06-18 PROCEDURE — 120N000001 HC R&B MED SURG/OB

## 2024-06-18 PROCEDURE — 36415 COLL VENOUS BLD VENIPUNCTURE: CPT | Performed by: FAMILY MEDICINE

## 2024-06-18 PROCEDURE — 250N000013 HC RX MED GY IP 250 OP 250 PS 637: Performed by: STUDENT IN AN ORGANIZED HEALTH CARE EDUCATION/TRAINING PROGRAM

## 2024-06-18 PROCEDURE — 250N000013 HC RX MED GY IP 250 OP 250 PS 637: Performed by: EMERGENCY MEDICINE

## 2024-06-18 PROCEDURE — 999N000226 HC STATISTIC SLP IP EVAL DEFER

## 2024-06-18 PROCEDURE — 99222 1ST HOSP IP/OBS MODERATE 55: CPT | Performed by: STUDENT IN AN ORGANIZED HEALTH CARE EDUCATION/TRAINING PROGRAM

## 2024-06-18 PROCEDURE — 70553 MRI BRAIN STEM W/O & W/DYE: CPT

## 2024-06-18 PROCEDURE — 999N000208 ECHOCARDIOGRAM COMPLETE

## 2024-06-18 PROCEDURE — 93005 ELECTROCARDIOGRAM TRACING: CPT | Performed by: EMERGENCY MEDICINE

## 2024-06-18 PROCEDURE — 97535 SELF CARE MNGMENT TRAINING: CPT | Mod: GO

## 2024-06-18 PROCEDURE — 97161 PT EVAL LOW COMPLEX 20 MIN: CPT | Mod: GP

## 2024-06-18 PROCEDURE — 99207 PR APP CREDIT; MD BILLING SHARED VISIT: CPT | Performed by: INTERNAL MEDICINE

## 2024-06-18 PROCEDURE — 82465 ASSAY BLD/SERUM CHOLESTEROL: CPT | Performed by: FAMILY MEDICINE

## 2024-06-18 PROCEDURE — 97530 THERAPEUTIC ACTIVITIES: CPT | Mod: GO

## 2024-06-18 PROCEDURE — 250N000013 HC RX MED GY IP 250 OP 250 PS 637: Performed by: FAMILY MEDICINE

## 2024-06-18 PROCEDURE — 99223 1ST HOSP IP/OBS HIGH 75: CPT | Performed by: FAMILY MEDICINE

## 2024-06-18 RX ORDER — ASPIRIN 325 MG
325 TABLET ORAL ONCE
Status: COMPLETED | OUTPATIENT
Start: 2024-06-18 | End: 2024-06-18

## 2024-06-18 RX ORDER — LIDOCAINE 40 MG/G
CREAM TOPICAL
Status: DISCONTINUED | OUTPATIENT
Start: 2024-06-18 | End: 2024-06-19 | Stop reason: HOSPADM

## 2024-06-18 RX ORDER — LIDOCAINE 4 G/G
1 PATCH TOPICAL
Status: DISCONTINUED | OUTPATIENT
Start: 2024-06-19 | End: 2024-06-19 | Stop reason: HOSPADM

## 2024-06-18 RX ORDER — CLOPIDOGREL BISULFATE 75 MG/1
300 TABLET ORAL ONCE
Status: COMPLETED | OUTPATIENT
Start: 2024-06-18 | End: 2024-06-18

## 2024-06-18 RX ORDER — CLOPIDOGREL BISULFATE 75 MG/1
75 TABLET ORAL DAILY
Status: DISCONTINUED | OUTPATIENT
Start: 2024-06-19 | End: 2024-06-19 | Stop reason: HOSPADM

## 2024-06-18 RX ORDER — ASPIRIN 81 MG/1
81 TABLET, CHEWABLE ORAL DAILY
Status: DISCONTINUED | OUTPATIENT
Start: 2024-06-19 | End: 2024-06-19 | Stop reason: HOSPADM

## 2024-06-18 RX ORDER — CYCLOBENZAPRINE HCL 5 MG
5 TABLET ORAL
Status: COMPLETED | OUTPATIENT
Start: 2024-06-18 | End: 2024-06-18

## 2024-06-18 RX ORDER — GADOBUTROL 604.72 MG/ML
5.75 INJECTION INTRAVENOUS ONCE
Status: COMPLETED | OUTPATIENT
Start: 2024-06-18 | End: 2024-06-18

## 2024-06-18 RX ORDER — ATORVASTATIN CALCIUM 40 MG/1
80 TABLET, FILM COATED ORAL EVERY EVENING
Status: DISCONTINUED | OUTPATIENT
Start: 2024-06-18 | End: 2024-06-19 | Stop reason: HOSPADM

## 2024-06-18 RX ADMIN — ASPIRIN 325 MG ORAL TABLET 325 MG: 325 PILL ORAL at 02:06

## 2024-06-18 RX ADMIN — CLOPIDOGREL BISULFATE 300 MG: 75 TABLET ORAL at 02:21

## 2024-06-18 RX ADMIN — CYCLOBENZAPRINE HYDROCHLORIDE 5 MG: 5 TABLET, FILM COATED ORAL at 03:12

## 2024-06-18 RX ADMIN — ATORVASTATIN CALCIUM 80 MG: 40 TABLET, FILM COATED ORAL at 20:40

## 2024-06-18 RX ADMIN — GADOBUTROL 5.75 ML: 604.72 INJECTION INTRAVENOUS at 00:42

## 2024-06-18 ASSESSMENT — ACTIVITIES OF DAILY LIVING (ADL)
ADLS_ACUITY_SCORE: 28
ADLS_ACUITY_SCORE: 43
ADLS_ACUITY_SCORE: 43
ADLS_ACUITY_SCORE: 38
ADLS_ACUITY_SCORE: 23
ADLS_ACUITY_SCORE: 35
ADLS_ACUITY_SCORE: 28
ADLS_ACUITY_SCORE: 23
DEPENDENT_IADLS:: INDEPENDENT
ADLS_ACUITY_SCORE: 35
ADLS_ACUITY_SCORE: 35
ADLS_ACUITY_SCORE: 23
ADLS_ACUITY_SCORE: 43
ADLS_ACUITY_SCORE: 23
ADLS_ACUITY_SCORE: 38
ADLS_ACUITY_SCORE: 43
ADLS_ACUITY_SCORE: 35
ADLS_ACUITY_SCORE: 38
ADLS_ACUITY_SCORE: 23
ADLS_ACUITY_SCORE: 23
ADLS_ACUITY_SCORE: 38
ADLS_ACUITY_SCORE: 43
ADLS_ACUITY_SCORE: 28
ADLS_ACUITY_SCORE: 23

## 2024-06-18 NOTE — PLAN OF CARE
BP's elevated, endorses left sided back pain/spasms 10/10, flexeril, massage and heat helpful per pt report.  NIH-0, difficult to assess 2/2 language barrier, good strength bilaterally, no drift.  Passed bedside dysphagia screen.  Blood sugar stable.    Goal Outcome Evaluation:  Problem: Comorbidity Management  Goal: Blood Pressure in Desired Range  Outcome: Progressing  Intervention: Maintain Blood Pressure Management  Recent Flowsheet Documentation  Taken 6/18/2024 0300 by Sigrid Posadas RN  Medication Review/Management: medications reviewed     Problem: Fall Injury Risk  Goal: Absence of Fall and Fall-Related Injury  Outcome: Progressing  Intervention: Identify and Manage Contributors  Recent Flowsheet Documentation  Taken 6/18/2024 0300 by Sigrid Posadas RN  Medication Review/Management: medications reviewed  Intervention: Promote Injury-Free Environment  Recent Flowsheet Documentation  Taken 6/18/2024 0300 by Sigrid Posadas RN  Safety Promotion/Fall Prevention:   supervised activity   safety round/check completed   room near nurse's station   nonskid shoes/slippers when out of bed   lighting adjusted   activity supervised     Problem: Stroke, Ischemic (Includes Transient Ischemic Attack)  Goal: Optimal Coping  Outcome: Progressing  Goal: Optimal Cerebral Tissue Perfusion  Outcome: Progressing  Goal: Optimal Cognitive Function  Outcome: Progressing  Goal: Improved Communication Skills  Outcome: Progressing  Goal: Optimal Functional Ability  Outcome: Progressing  Intervention: Optimize Functional Ability  Recent Flowsheet Documentation  Taken 6/18/2024 0300 by Sigrid Posadas RN  Activity Management: activity adjusted per tolerance  Goal: Effective Oxygenation and Ventilation  Outcome: Progressing  Goal: Safe and Effective Swallow  Outcome: Progressing  Goal: Effective Urinary Elimination  Outcome: Progressing

## 2024-06-18 NOTE — ED NOTES
EMERGENCY DEPARTMENT SIGN OUT NOTE        ED COURSE AND MEDICAL DECISION MAKING  Patient was signed out to me by Dr Teddy Valverde at 23:20     In brief, Rafael Colin is a 54 year old female who initially presented to the ED for evaluation of left arm and leg pain and weakness.  The onset of symptoms was difficult to determine even with the help of the .  Initial CT scans revealed a suspected small right frontal lobe infarction.  MRI pending at the time of signout.  Patient will likely need admission.    The patient's MRI shows multifocal infarcts on the right side involving the UZIEL and MCA territories.  The patient also appears to have a likely subacute infarct noted as well.    The patient's case was discussed with the on-call stroke neurologist who recommended starting the patient on aspirin and Plavix.    The patient was admitted to the hospitalist service after discussed the case with Dr. Rinaldi.     An EKG was obtained which revealed sinus bradycardia without any concerning ST or T wave changes.         At time of sign out, disposition was pending     FINAL IMPRESSION    1. Multiple cerebral infarctions (H)    2. Left-sided weakness        ED MEDS  Medications   lidocaine 1 % 0.1-1 mL (has no administration in time range)   lidocaine (LMX4) cream (has no administration in time range)   sodium chloride (PF) 0.9% PF flush 3 mL (has no administration in time range)   sodium chloride (PF) 0.9% PF flush 3 mL (has no administration in time range)   Medication Instructions - Avoid dextrose in IV solutions. (has no administration in time range)   medication instruction - No oral meds if patient didn't pass dysphagia screen (has no administration in time range)   ketorolac (TORADOL) injection 30 mg (30 mg Intravenous $Given 6/17/24 2057)   iopamidol (ISOVUE-370) solution 67 mL (67 mLs Intravenous $Given 6/17/24 2149)   gadobutrol (GADAVIST) injection 5.75 mL (5.75 mLs Intravenous $Given 6/18/24 0042)   aspirin  (ASA) tablet 325 mg (325 mg Oral $Given 6/18/24 0206)   clopidogrel (PLAVIX) tablet 300 mg (300 mg Oral $Given 6/18/24 0222)       LAB  Labs Ordered and Resulted from Time of ED Arrival to Time of ED Departure   COMPREHENSIVE METABOLIC PANEL - Abnormal       Result Value    Sodium 140      Potassium 4.3      Carbon Dioxide (CO2) 24      Anion Gap 11      Urea Nitrogen 11.5      Creatinine 0.60      GFR Estimate >90      Calcium 9.6      Chloride 105      Glucose 92      Alkaline Phosphatase 90      AST 23      ALT 26      Protein Total 8.6 (*)     Albumin 4.4      Bilirubin Total 0.4     CK TOTAL - Normal         CBC WITH PLATELETS AND DIFFERENTIAL    WBC Count 5.0      RBC Count 5.09      Hemoglobin 14.8      Hematocrit 45.1      MCV 89      MCH 29.1      MCHC 32.8      RDW 12.4      Platelet Count 266      % Neutrophils 61      % Lymphocytes 28      % Monocytes 8      % Eosinophils 3      % Basophils 1      % Immature Granulocytes 0      NRBCs per 100 WBC 0      Absolute Neutrophils 3.1      Absolute Lymphocytes 1.4      Absolute Monocytes 0.4      Absolute Eosinophils 0.1      Absolute Basophils 0.0      Absolute Immature Granulocytes 0.0      Absolute NRBCs 0.0     GLUCOSE MONITOR NURSING POCT   GLUCOSE MONITOR NURSING POCT   GLUCOSE MONITOR NURSING POCT   GLUCOSE MONITOR NURSING POCT   GLUCOSE MONITOR NURSING POCT   TROPONIN T, HIGH SENSITIVITY   HEMOGLOBIN A1C   LIPID PROFILE           RADIOLOGY    MR Brain w/o & w Contrast   Final Result   IMPRESSION:   1.  Multifocal infarcts of varying ages in the right UZIEL and MCA territories with more acute infarcts in the right frontal and parietal lobes. Patchy areas of enhancement likely reflect subacute infarcts, however, continued attention on follow-up is    recommended to document expected resolution.   2.  No acute intracranial hemorrhage.      XR Knee Left 1/2 Views   Final Result   IMPRESSION: Normal joint spaces and alignment. No fracture or joint  effusion.      XR Elbow Left 2 Views   Final Result   IMPRESSION: 2 views of the left shoulder  show mild AC joint arthrosis. There is no evidence of an acute fracture or dislocation. Glenohumeral joint space is maintained.       2 views of the left elbow show no evidence of an acute fracture. Joint spaces are maintained. No joint effusion.      XR Shoulder Left 2 Views   Final Result   IMPRESSION: 2 views of the left shoulder  show mild AC joint arthrosis. There is no evidence of an acute fracture or dislocation. Glenohumeral joint space is maintained.       2 views of the left elbow show no evidence of an acute fracture. Joint spaces are maintained. No joint effusion.      CTA Head Neck with Contrast   Final Result   IMPRESSION:    HEAD CT:   1.  Small suspected infarct in the posterior right frontal lobe involving the precentral gyrus.   2.  No mass effect or hemorrhage.   3.  Chronic right frontal (UZIEL distribution) and right lentiform nucleus infarcts.      HEAD CTA:    1.  Extensive intracranial atherosclerosis with severe stenosis of the communicating segment of the right ICA and tandem severe stenosis at the carotid terminus, likely accounting for the above described infarct.   2.  Right A3 occlusion corresponding to an area of chronic infarct.   3.  Additional multifocal severe stenoses and occlusions detailed above.      NECK CTA:   1.  No large vessel occlusion or hemodynamically significant stenosis.      Dr. Valverde was contacted by me on 6/17/2024 10:00 PM CDT with the preliminary report.      XR Hip Left 2-3 Views   Final Result   IMPRESSION: Left hip joint space is maintained. No evidence of an acute fracture or dislocation. Mild acetabular dysplasia bilaterally. Contrast in the urinary collecting system.      Echocardiogram Complete w Bubble Study - For age < 60 yrs    (Results Pending)       EKG  Sinus bradycardia.  Rate of 58.  Normal QRS.  Normal QT.  No ST or T wave changes.  No significant change  compared to the EKG on 10/13/2017.    DISCHARGE MEDS  New Prescriptions    No medications on file       DO ENEDINA Amaya [facility] EMERGENCY DEPARTMENT  [address] [place] [address] [id number] [phone]         Lavern Campbell DO  06/18/24 0232

## 2024-06-18 NOTE — ED TRIAGE NOTES
L leg weakness starting last night at 2200, was worse when she woke up this morning. Pt has some ability to push and pull with LLE but weaker than RLE.     Provider called to triage for neuro assessment. Pt tells provider it started a couple days ago.     Now additionally reporting complains of headache, whole L arm and whole L leg pain.

## 2024-06-18 NOTE — PROGRESS NOTES
"   06/18/24 9745   Appointment Info   Signing Clinician's Name / Credentials (PT) Eboni Ogden PT, DPT       Present yes   Language Claudia  (through [employer])   Living Environment   People in Home alone   Current Living Arrangements apartment   Home Accessibility no concerns   Transportation Anticipated family or friend will provide   Living Environment Comments states adult children who live nearby can help as needed. Reports daughter is currently helping care for daughter's paralyzed father   Self-Care   Usual Activity Tolerance good   Current Activity Tolerance good   Equipment Currently Used at Home none   Fall history within last six months no   Activity/Exercise/Self-Care Comment IND at baseline with ADLs. Pt states she does not currently work as she has been in too much pain to work.   General Information   Onset of Illness/Injury or Date of Surgery 06/17/24   Referring Physician Julianna Rinaldi MD   Patient/Family Therapy Goals Statement (PT) none stated.   Pertinent History of Current Problem (include personal factors and/or comorbidities that impact the POC) Per chart: \"54 year old female with PMH significant for hypertension, hyperlipidemia and chronic low back pain who is admitted on 6/18/2024 with multiple acute and subacute CVAs\"   Existing Precautions/Restrictions fall   Cognition   Affect/Mental Status (Cognition) WNL   Orientation Status (Cognition) oriented x 4   Follows Commands (Cognition) WNL   Pain Assessment   Patient Currently in Pain No   Range of Motion (ROM)   Range of Motion ROM deficits secondary to weakness   Strength (Manual Muscle Testing)   Strength (Manual Muscle Testing) Deficits observed during functional mobility   Strength Comments weaker along LLE HS & quads 3/5 MMT. compared to RLE 3+/5 MMT HS, 4-/5 MMT quads, 3+/5 MMT ankle DF. Pt unable to DF L ankle in static standing & sitting.    Bed Mobility   Bed Mobility supine-sit-supine   Supine-Sit-Supine " [place] (Bed Mobility) independent   Transfers   Transfers sit-stand transfer   Transfer Safety Concerns Noted decreased balance during turns   Impairments Contributing to Impaired Transfers impaired balance;decreased strength   Sit-Stand Transfer   Sit-Stand [place] (Transfers) contact guard   Comment, (Sit-Stand Transfer) slightly unsteady on feet in standing   Gait/Stairs (Locomotion)   [place] Level (Gait) contact guard   Distance in Feet (Gait) 40'   Pattern (Gait) step-to   Deviations/Abnormal Patterns (Gait) left sided deviations;base of support, narrow;weight shifting decreased   Left Sided Gait Deviations foot drop/toe drag;heel strike decreased  (L toe pointed outward)   Comment, (Gait/Stairs) unsteady on feet, weaving gait L&R   Balance   Balance other (describe)   Balance Comments unsteady on feet without AD per L toe catching on floor   Clinical Impression   Criteria for Skilled Therapeutic Intervention Yes, treatment indicated   PT Diagnosis (PT) impaired functional mobility   Influenced by the following impairments L sided weakness; impaired balance   Functional limitations due to impairments gait, transfers   Clinical Presentation (PT Evaluation Complexity) stable   Clinical Presentation Rationale clinical judgment   Clinical Decision Making (Complexity) low complexity   Planned Therapy Interventions (PT) balance training;gait training;home exercise program;neuromuscular re-education;patient/family education;strengthening;stretching;ROM (range of motion);transfer training;progressive activity/exercise;home program guidelines   Risk & Benefits of therapy have been explained evaluation/treatment results reviewed;care plan/treatment goals reviewed;risks/benefits reviewed;participants included;patient   PT Total Evaluation Time   PT Eval, Low Complexity Minutes (81230) 9   Physical Therapy Goals   PT Frequency Daily   PT Predicted Duration/Target Date for Goal Attainment 06/25/24   PT  Goals Gait;Transfers   PT: Transfers Modified independent;Sit to/from stand;Bed to/from chair   PT: Gait Modified independent;Rolling walker;Greater than 200 feet   Interventions   Interventions Quick Adds Gait Training;Therapeutic Activity   Therapeutic Activity   Therapeutic Activities: dynamic activities to improve functional performance Minutes (87688) 8   Treatment Detail/Skilled Intervention SBA sit<>stand recliner/eob with SPC & FWW throughout session. Education on benefits of continued therapy outside of hospital as well to return to PLOF to help improve function of LLE. Pt verbalized understanding. Cues for scooting and positioning in bed SBA to decrease risk of skin shear. Pt left supine with call light & all needs in reach.   Gait Training   Gait Training Minutes (80288) 10   Symptoms Noted During/After Treatment (Gait Training) none   Treatment Detail/Skilled Intervention Gait training with SPC SBA x50', education on technique. Pt demos to be slightly more steady on feet however has some difficulty with coordination for 2 point gait and SPC tending to get in way of advancing RLE. Gait training with FWW x150' SBA, time taken to properly fit FWW and educate on proper use. Pt tends to push FWW far ahead and  intermittently and with turns, education to maintain in contact with floor. Pt is much more steady on feet with FWW & easier to navigate. With VC pt still unable to internally rotate LLE to maintain toes forward as her RLE does, unclear if d/t misinterpretation with  or if pt unable. Education on use at all times to improve balance and decrease fall risk.   Distance in Feet 50', 150'   Physical Assistance Level (Gait Training) supervision;verbal cues;nonverbal cues (demo/gestures)   Assistive Device (Gait Training) straight cane;rolling walker  (fww)   Pattern Analysis (Gait Training) swing-through gait   PT Discharge Planning   PT Plan progress gait with FWW,   PT Discharge  Recommendation (DC Rec) [facility] patient will benefit from intensive, interdisciplinary therapy.  Anticipate will be able to tolerate 3 hours of therapy per day   PT Rationale for DC Rec Pt below PLOF, with LLE weakness impairing her gait and transfers requiring Ax1 with walker for safety. Unclear if patient is able to receive Ax1 at home from her daughter, as daughter does not live with patient. Recommend ARU as patient is young and for more intensive rehab to help patient return to PLOF of IND. If insurance does not cover ARU, pending progress with mobility in hospital recommend home with assist and home PT to help patient return to PLOF.   PT Brief overview of current status SBA transfers & gait FWW   PT Equipment Needed at Discharge walker, rolling  (fww)   Total Session Time   Timed Code Treatment Minutes 18   Total Session Time (sum of timed and untimed services) 27

## 2024-06-18 NOTE — CONSULTS
"  [facility]    Stroke Telephone Note    I was called by Teddy Valverde on 06/17/24 regarding patient Rafael Colin. The patient is a 54 year old female with HTN presents with left upper and lower extremities for past few days.    Vitals  BP: (!) 164/87   Pulse: 56   Resp: 20   Temp: 97.5  F (36.4  C)   Weight: 58.5 kg (129 lb)    Imaging Findings  CT head: Small suspected infarct in the posterior right frontal lobe involving the precentral gyru   CTA head/neck: Extensive intracranial atherosclerosis with severe stenosis of the communicating segment of the right ICA and tandem severe stenosis at the carotid terminus, likely accounting for the above described infarct.Right A3 occlusion corresponding to an area of chronic infarct.  MRI Brain w/w/o contrast: Multifocal infarcts of varying ages in the right UZIEL and MCA territories with more acute infarcts in the right frontal and parietal lobes.     Impression  Acute ischemic stroke of multifocal areas due to large-artery atherosclerosis      Recommendations  - Load with  mg once  - Use orderset: \"Ischemic Stroke Routine Admission\" or \"Ischemic Stroke No Thrombolytics/No Thrombectomy ICU Admission\"  - Place Neurology IP Stroke Consult order   - Neurochecks and Vital Signs every q4h   - Permissive HTN; goal SBP < 220 mmHg  - Daily aspirin 81 mg for secondary stroke prevention  - Plavix (clopidogrel) 300 mg PO loading dose x 1  - Plavix (clopidogrel) 75 mg PO Daily  - Statin: after lipid profile  - TTE (with Bubble Study if age 60 yrs or less)  - Telemetry, EKG  - Bedside Glucose Monitoring  - A1c, Lipid Panel, Troponin x 3  - PT/OT/SLP  - Stroke Education  - Euthermia, Euglycemia    My recommendations are based on the information provided over the phone by Rafael Colin's in-person providers. They are not intended to replace the clinical judgment of her in-person providers. I was not requested to personally see or examine the patient at this time. " "    The Stroke Staff is Dr. Tan.    Uday Pinto MD  Vascular Neurology Fellow    To page me or covering stroke neurology team member, click here: [email or web address]  Choose \"On Call\" tab at top, then select \"NEUROLOGY/ALL SITES\" from middle drop-down box, press Enter, then look for \"stroke\" or \"telestroke\" for your site.   "

## 2024-06-18 NOTE — ED NOTES
"  [facility] ED Handoff Report    ED Chief Complaint: extremey weakness      ED Diagnosis:  (I63.9) Multiple cerebral infarctions (H)  Comment:   Left arm & leg weakness  Plan: admit    (R53.1) Left-sided weakness  Comment: HX of cerebral infarcts  Plan: admit,observe        PMH:  History reviewed. No pertinent past medical history.     Code Status:  No Order     Falls Risk: No Band: Not applicable    Current Living Situation/Residence: lives in a house     Elimination Status: Continent: Yes     Activity Level: SBA    Patients Preferred Language:  Other: Claudia     Needed: Yes    Vital Signs:  BP (!) 143/84   Pulse 61   Temp 97.5  F (36.4  C)   Resp 20   Ht 1.397 m (4' 7\")   Wt 58.5 kg (129 lb)   LMP  (LMP Unknown)   SpO2 96%   BMI 29.98 kg/m       Cardiac Rhythm: SR    Pain Score: 0/10    Is the Patient Confused:  No    Last Food or Drink: 06/18/24 at 0200      Focused Assessment:  Neuro, VSS    Tests Performed: Done: Labs and Imaging    Treatments Provided:  Imaging. Labs, Aspirin      Family Dynamics/Concerns: No    Family Updated On Visitor Policy: Yes    Plan of Care Communicated to Family: Yes    Who Was Updated about Plan of Care: Daughter    Belongings Checklist Done and Signed by Patient: No    Belongings Sent with Patient: yes, clothes      Medications sent with patient: no    Covid: asymptomatic , not ordered      Additional Information: CT/MRI showed small acute cerebral infarcts. Patient A&O, ambulates with SBA, speech clear, can swallow liquids. Given aspirin and plavix. Had CT and MRI of head. Complaint is 1-2 days of left arm, left leg weakness. HX of HTN,GERD, back pain, right leg swelling. Hospitalist has interviewed patient. 18G IV RAC. VSS. Patient calm, no distress, resting comfortably in bed. Claudia speaking.    RN: Micheal Pardo RN     6/18/2024 2:11 AM       "

## 2024-06-18 NOTE — H&P
"[facility] [place]    History and Physical - Hospitalist Service       Date of Admission:  6/18/2024    Assessment & Plan      Rafael Colin is a 54 year old female with PMH significant for hypertension, hyperlipidemia and chronic low back pain who is admitted on 6/18/2024 with multiple acute and subacute CVAs.    # Multiple acute and subacute CVAs- Admit patient.  CT head and MRI brain revealed: \" Multifocal infarcts of various ages in the right UZIEL and MCA territories with more acute infarcts in the right frontal and parietal lobes.... subacute infarcts\" noted as well....  No acute intracranial hemorrhage\".  CTA head shows extensive intracranial atherosclerosis with severe stenosis of communicating segments of the right ICA and tandem severe stenosis at carotid terminus.  Right A3 occlusion corresponding to an area of chronic infarct.  Additional multifocal severe stenosis also noted.  CTA neck no hemodynamically significant stenosis.  S/p  mg in ED.  Follow-up echocardiogram, hemoglobin A1c, TSH, lipid panel.  Continue telemetry monitoring. PT/OT. Consult Neurology.    # HTN- Allow permissive hypertension while stroke workup in progress.  Recommend labetalol if BP >220/120.    # Hyperlipidemia- Follow up lipid panel.    # Bradycardia- Follow up TSH and echocardiogram.  Continue telemetry monitoring.    # Chronic low back pain- Patient described back pain as muscle spasm.  Trial of Flexeril.        Diet: Combination Diet  DVT Prophylaxis: Pneumatic Compression Devices  Raman Catheter: Not present  Lines: None     Cardiac Monitoring: ACTIVE order. Indication: Stroke, acute (48 hours)  Code Status: Full Code per patient.    Clinically Significant Risk Factors Present on Admission                              # Overweight: Estimated body mass index is 29.98 kg/m  as calculated from the following:    Height as of this encounter: 1.397 m (4' 7\").    Weight as of this encounter: 58.5 kg (129 lb).       "        Disposition Plan   Anticipate greater than 2 midnight inpatient hospital stay.           Julianna Ramirez MD  Hospitalist Service  [facility]  Securely message with [employer] (more info)  Text page via [employer] Paging/Directory     ______________________________________________________________________    Chief Complaint   Left arm and leg weakness    History is obtained from the patient via language line Claudia , ED physician and chart review.    History of Present Illness   Rafael Colin is a 54 year old female with PMH significant for hypertension, hyperlipidemia and chronic low back pain who presents to ED due to left-sided weakness.  Patient is a poor historian.  Left-sided weakness began today.  Patient says she was unable to walk.  Associated symptoms include headache and chronic low back pain.  She denies visual changes, chest pain, shortness of breath, nausea, vomiting, diarrhea, fever or chills.  Patient is calm, flat affect and currently appears in no acute distress.      Past Medical History    Hypertension  Hyperlipidemia  Chronic low back pain    Past Surgical History   Denies    Prior to Admission Medications   None        Review of Systems    The 10 point Review of Systems is negative other than noted in the HPI.    Social History   I have reviewed this patient's social history and updated it with pertinent information if needed.  Social History     Tobacco Use    Smoking status: Never     Passive exposure: Never    Smokeless tobacco: Never   Vaping Use    Vaping status: Never Used   Substance Use Topics    Alcohol use: No    Drug use: No         Family History   Denies    Physical Exam   Vital Signs: Temp: 97.5  F (36.4  C)   BP: (!) 143/84 Pulse: 61   Resp: 20 SpO2: 96 % O2 Device: None (Room air)    Weight: 129 lbs 0 oz    General Appearance: Awake, alert, flat affect  Respiratory: CTAB  Cardiovascular: Regular rate, S1-S2  GI: +BS, soft, nontender, nondistended  Skin:  No rash, no jaundice  Neuro: Neuro examination limited due to language barrier.  Even with the use of an , difficult to ascertain whether patient is not following some commands or does not understand what she is being asked to do.  Cranial nervesII-XII grossly intact.  Strength 4/5 left upper extremity and left lower extremity, 5/5 right upper extremity, right lower extremity.  Other: No pedal edema    Medical Decision Making       55 MINUTES SPENT BY ME on the date of service doing chart review, history, exam, documentation & further activities per the note.      Data     I have personally reviewed the following data over the past 24 hrs:    5.0  \   14.8   / 266     140 105 11.5 /  92   4.3 24 0.60 \     ALT: 26 AST: 23 AP: 90 TBILI: 0.4   ALB: 4.4 TOT PROTEIN: 8.6 (H) LIPASE: N/A       Imaging results reviewed over the past 24 hrs:   Recent Results (from the past 24 hour(s))   XR Hip Left 2-3 Views    Narrative    EXAM: XR HIP LEFT 2-3 VIEWS  LOCATION: [facility]  DATE: 6/17/2024    INDICATION: left hip pain  COMPARISON: None.      Impression    IMPRESSION: Left hip joint space is maintained. No evidence of an acute fracture or dislocation. Mild acetabular dysplasia bilaterally. Contrast in the urinary collecting system.   CTA Head Neck with Contrast    Narrative    EXAM: CTA HEAD NECK W CONTRAST  LOCATION: [facility]  DATE/TIME: 6/17/2024 9:49 PM CDT    INDICATION: Questionable left leg and arm weakness  COMPARISON: None.  CONTRAST: 67 mL Isovue 370  TECHNIQUE: Head and neck CT angiogram with IV contrast. Noncontrast head CT followed by axial helical CT images of the head and neck vessels obtained during the arterial phase of intravenous contrast administration. Axial 2D reconstructed images and   multiplanar 3D MIP reconstructed images of the head and neck vessels were performed by the technologist. Dose reduction techniques were used. All stenosis  measurements made according to NASCET criteria unless otherwise specified.    FINDINGS:   NONCONTRAST HEAD CT:   INTRACRANIAL CONTENTS: No intracranial hemorrhage, extraaxial collection, or mass effect.  Small area of low attenuation in the posterior right frontal lobe involving the precentral gyrus (series 5, image 27). Small area of low attenuation in the and   right paramedian frontal (UZIEL distribution), favor chronic infarct. Small chronic right lentiform nucleus infarct.  Senescent mineralization of the globus pallidi. Mild generalized volume loss. No hydrocephalus.     VISUALIZED ORBITS/SINUSES/MASTOIDS: No intraorbital abnormality. No significant paranasal sinus mucosal disease. No middle ear or mastoid effusion. Left mastoidectomy.    BONES/SOFT TISSUES: No acute abnormality.    HEAD CTA:  ANTERIOR CIRCULATION: Atherosclerosis of the carotid siphons. Severe stenosis of the communicating segment of the right ICA with tandem severe stenosis at the carotid terminus (series 11, images 333 and 338, respectively). Multifocal severe right M3 and   M4 stenoses. Right A3 occlusion. Severe distal left M1 and left M3 inferior division stenosis. No aneurysm or high flow vascular malformation.  Standard [place] anatomy.    POSTERIOR CIRCULATION: Multifocal severe right V4 stenoses. Moderate mid basilar stenosis.  Left P1 occlusion with distal flow in the P2. No aneurysm or high flow vascular malformation.  Dominant left and smaller right vertebral artery contribute to a   normal basilar artery.     DURAL VENOUS SINUSES: Not well evaluated on a technical basis.    NECK CTA:  RIGHT CAROTID: No measurable stenosis or dissection.    LEFT CAROTID: No measurable stenosis or dissection.    VERTEBRAL ARTERIES: No focal stenosis or dissection. Dominant left and smaller right vertebral arteries.    AORTIC ARCH: Classic aortic arch anatomy with no significant stenosis at the origin of the great vessels.    NONVASCULAR  STRUCTURES: Unremarkable.      Impression    IMPRESSION:   HEAD CT:  1.  Small suspected infarct in the posterior right frontal lobe involving the precentral gyrus.  2.  No mass effect or hemorrhage.  3.  Chronic right frontal (UZIEL distribution) and right lentiform nucleus infarcts.    HEAD CTA:   1.  Extensive intracranial atherosclerosis with severe stenosis of the communicating segment of the right ICA and tandem severe stenosis at the carotid terminus, likely accounting for the above described infarct.  2.  Right A3 occlusion corresponding to an area of chronic infarct.  3.  Additional multifocal severe stenoses and occlusions detailed above.    NECK CTA:  1.  No large vessel occlusion or hemodynamically significant stenosis.    Dr. Valverde was contacted by me on 6/17/2024 10:00 PM CDT with the preliminary report.   XR Shoulder Left 2 Views    Narrative    EXAM: XR SHOULDER LEFT 2 VIEWS, XR ELBOW LEFT 2 VIEWS  LOCATION: [facility]  DATE: 6/17/2024    INDICATION: left shoulder pain  COMPARISON: None.      Impression    IMPRESSION: 2 views of the left shoulder  show mild AC joint arthrosis. There is no evidence of an acute fracture or dislocation. Glenohumeral joint space is maintained.     2 views of the left elbow show no evidence of an acute fracture. Joint spaces are maintained. No joint effusion.   XR Elbow Left 2 Views    Narrative    EXAM: XR SHOULDER LEFT 2 VIEWS, XR ELBOW LEFT 2 VIEWS  LOCATION: [facility]  DATE: 6/17/2024    INDICATION: left shoulder pain  COMPARISON: None.      Impression    IMPRESSION: 2 views of the left shoulder  show mild AC joint arthrosis. There is no evidence of an acute fracture or dislocation. Glenohumeral joint space is maintained.     2 views of the left elbow show no evidence of an acute fracture. Joint spaces are maintained. No joint effusion.   XR Knee Left 1/2 Views    Narrative    EXAM: XR KNEE LEFT 1/2 VIEWS  LOCATION:   [facility]  DATE: 6/17/2024    INDICATION: Left knee pain  COMPARISON: None.      Impression    IMPRESSION: Normal joint spaces and alignment. No fracture or joint effusion.   MR Brain w/o & w Contrast    Narrative    EXAM: MR BRAIN W/O and W CONTRAST  LOCATION: [facility]  DATE: 6/18/2024    INDICATION: Stroke follow-up with abnormal head CT, left upper and lower extremity weakness.  COMPARISON: 6/17/2024.  CONTRAST: 5.75 mL Gadavist.  TECHNIQUE: Routine multiplanar multisequence head MRI without and with intravenous contrast.    FINDINGS: Significantly motion degraded exam.  INTRACRANIAL CONTENTS: There are multifocal infarcts of varying ages in the right MCA and UZIEL territory, with more acute infarcts in the right frontal and parietal lobes. Patchy enhancement in the right basal ganglia and centrum semiovale extending into   the right frontal and paramedian parietal lobes, likely reflecting late subacute infarcts. No mass, acute hemorrhage, or extra-axial fluid collections. Scattered nonspecific T2/FLAIR hyperintensities within the cerebral white matter most consistent with   mild chronic microvascular ischemic change. Mild generalized cerebral atrophy. No hydrocephalus. Normal position of the cerebellar tonsils.     SELLA: No abnormality accounting for technique.    OSSEOUS STRUCTURES/SOFT TISSUES: Normal marrow signal. The major intracranial vascular flow voids are maintained.     ORBITS: No abnormality accounting for technique.     SINUSES/MASTOIDS: Mild mucosal thickening of the left maxillary sinus. Left mastoidectomy changes with opacification of the mastoidectomy bowl.       Impression    IMPRESSION:  1.  Multifocal infarcts of varying ages in the right UZIEL and MCA territories with more acute infarcts in the right frontal and parietal lobes. Patchy areas of enhancement likely reflect subacute infarcts, however, continued attention on follow-up is    recommended to document expected resolution.  2.  No acute intracranial hemorrhage.

## 2024-06-18 NOTE — MEDICATION SCRIBE - ADMISSION MEDICATION HISTORY
"Medication Scribe Admission Medication History    Admission medication history is complete. The information provided in this note is only as accurate as the sources available at the time of the update.    Information Source(s): Patient, Patient's pharmacy, and Hospital records via in-person    Pertinent Information: Patient reports self management of medications.     Patient reports taking only one medication. Patient does not know the name of medication. Patient reports that medications was given to her by Dr. Knight.  Patient reports receiving medications at only the [facility]. [facility] has no fills of any medications since 7/27/23.     Progress note from 7/18/23 states \"The swelling started initially in the right knee. She saw Dr. Knight in May 2023 - he gave a med and the swelling went away. \"   5/9/23 note states that patient was given a supply of Celebrex. Possibility that this is medication from 7/18/23 note and possible medication that patient is reporting to be taken.     Since no definitive medication conclusions can be made, all medications on profile were deleted, including the Celebrex.    Changes made to PTA medication list:  Added: None  Deleted: Artifical tears, Celebrex, Zyrtec, D3,Flonase, Robitussin, Advil, Claritin, Cozaar, Montelukast, Patanol  Changed: None    Allergies reviewed with patient and updates made in EHR: yes    Medication History Completed By: Joao Sevilla 6/17/2024 10:52 PM    No outpatient medications have been marked as taking for the 6/17/24 encounter (Hospital Encounter).       "

## 2024-06-18 NOTE — PLAN OF CARE
Speech Language Pathology: Orders received. Chart reviewed and discussed with care team.? Speech Language Pathology not indicated due to no speech/language or swallow concern. NIH is 0. Pt has passed nursing dysphagia screen, diet in place.? Defer discharge recommendations to care team.? Will complete orders.

## 2024-06-18 NOTE — PROGRESS NOTES
06/18/24 0740   Appointment Info   Signing Clinician's Name / Credentials (OT) Renita Roberts GIDEON OTR/L CLT   Living Environment   People in Home alone   Current Living Arrangements apartment   Home Accessibility no concerns   Living Environment Comments I at baseline with ADLs, states dtr can help as needed   General Information   Onset of Illness/Injury or Date of Surgery 06/17/24   Referring Physician    Additional Occupational Profile Info/Pertinent History of Current Problem Paw Cristi is a 54 year old female with PMH significant for hypertension, hyperlipidemia and chronic low back pain who is admitted on 6/18/2024 with multiple acute and subacute CVAs.   Cognitive Status Examination   Affect/Mental Status (Cognitive) WNL   Follows Commands WNL   Range of Motion Comprehensive   General Range of Motion no range of motion deficits identified   Strength Comprehensive (MMT)   Comment, General Manual Muscle Testing (MMT) Assessment Slight weakness on LUE with elbow extension-- could be due to language barrier/use of  line   Bed Mobility   Bed Mobility supine-sit;sit-supine   Supine-Sit [place] (Bed Mobility) supervision   Sit-Supine [place] (Bed Mobility) supervision   Transfers   Transfers sit-stand transfer   Sit-Stand Transfer   Sit-Stand [place] (Transfers) supervision   Sit/Stand Transfer Comments Ambulated in room with CGA no device   Balance   Balance Comments EOB- SBA   Activities of Daily Living   BADL Assessment/Intervention lower body dressing   Lower Body Dressing Assessment/Training   [place] Level (Lower Body Dressing) socks;supervision   Clinical Impression   Criteria for Skilled Therapeutic Interventions Met (OT) Yes, treatment indicated   OT Diagnosis decreased strength for ADLs   OT Problem List-Impairments impacting ADL problems related to;activity tolerance impaired;balance;strength   Assessment of Occupational Performance 1-3 Performance Deficits    Identified Performance Deficits trsfs, drsg, stnading/amb   Planned Therapy Interventions (OT) ADL retraining;transfer training;progressive activity/exercise   Clinical Decision Making Complexity (OT) detailed assessment/moderate complexity   Risk & Benefits of therapy have been explained care plan/treatment goals reviewed   OT Total Evaluation Time   OT Eval, Moderate Complexity Minutes (29670) 8   OT Goals   Therapy Frequency (OT) Daily   OT Predicted Duration/Target Date for Goal Attainment 06/25/24   Interventions   Interventions Quick Adds Self-Care/Home Management;Therapeutic Procedures/Exercise   Self-Care/Home Management   Self-Care/Home Mgmt/ADL, Compensatory, Meal Prep Minutes (12311) 8   Treatment Detail/Skilled Intervention Toileting task- SBA with toilet trsf- Don/doff pants and brief with CGA and verbal cues for safety. Attempted to stand to thread pants. Instructed on safe technique. Stood at sink with SBA to wash hands. Bed trsf- SBA/CGA   Therapeutic Procedures/Exercise   Therapeutic Procedure: strength, endurance, ROM, flexibillity minutes (16725) 8   Treatment Detail/Skilled Intervention ambulated in room/guo/bathroom with CGA and no device- slower pace.   OT Discharge Planning   OT Plan trsfs, LB drsg, g/h   OT Discharge Recommendation (DC Rec) home with assist;home with home care occupational therapy   OT Rationale for DC Rec Patient would benefit from A from daughter if able with IADLs, driving, cleaning, meals. If unable to provide may need TCU   Total Session Time   Timed Code Treatment Minutes 16   Total Session Time (sum of timed and untimed services) 24

## 2024-06-18 NOTE — CONSULTS
[facility]      Neurology Stroke Consult    Patient Name: Rafael Colin  : 1970 MRN#: [id number]    STROKE DATA    Stroke Code:  Stroke code not activated.  Time patient seen:  2024 1500  Last known normal (pt's baseline):   over 1 month prior to admission    TPA treatment:  Not given due to unclear or unfavorable risk-benefit profile for extended window thrombolysis beyond the conventional 4.5 hour time window.     National Institutes of Health Stroke Scale (at presentation)  NIHSS done at:  time patient seen      Score    Level of consciousness:  (0)   Alert, keenly responsive     LOC questions:  (0)   Answers both questions correctly    LOC commands:  (0)   Performs both tasks correctly    Best gaze:  (0)   Normal    Visual:  (0)   No visual loss    Facial palsy:  (0)   Normal symmetrical movements    Motor arm (left):  (0)   No drift    Motor arm (right):  (0)   No drift    Motor leg (left):  (0)   No drift    Motor leg (right):  (0)   No drift    Limb ataxia:  (0)   Absent    Sensory:  (0)   Normal- no sensory loss    Best language:  (0)   Normal- no aphasia    Dysarthria:  (0)   Normal    Extinction and inattention:  (0)   No abnormality        NIHSS Total Score:  0        Dysphagia Screen  Time of screenin24  Screening results: Passed screening, no dysarthria - Regular Diet with thin liquids  24     ASSESSMENT & RECOMMENDATIONS     Ms. Colin presented with left hemibody weakness for at least several days and was found to have strokes in the right UZIEL and MCA territories most likely secondary to severe intracranial vessel disease.  Risk factors are also poorly controlled especially hyperlipidemia and hypertension with only mildly elevated A1c.  The goal for treatment will be optimal medical management of this intracranial atherosclerosis including tighter control of hyperlipidemia, and dual antiplatelet therapy for the next 3 months.  I explained all of this  to the patient including the importance of medication compliance, the importance of daily exercise, eating more vegetables, less meat, and less processed foods.     Impression:   #Acute stroke, right UZIEL and MCA territories  #Intracranial atherosclerosis  #Hyperlipidemia  #Hypertension    Recommendations:  Acute Ischemic Stroke (without tPA) Plan  - Neurochecks Q 4 hours  -Goal of normotension, less than 130/80 in the long-term  - Avoid hypotonic IV fluids  - Daily aspirin 81 mg for secondary stroke prevention  -Plavix 75 mg daily for 3 months, ending September 18, 2024  - Statin: Atorvastatin 80 daily  - MRI Stroke Protocol  - Telemetry, EKG  - Bedside Glucose Monitoring  - PT/OT/SLP  - Stroke Education  - Depression Screen  - Apnea Screen  - Euthermia, Euglycemia  -Stroke neurology follow-up in 2 to 3 months  -Okay to discharge from the stroke neurology perspective, as her workup has been completed here        HPI  Rafael Colin is a 54 year old female with no clear past medical history who presented to the emergency department yesterday for the evaluation of weakness of the left arm and the left leg.  During my history, she described that her symptoms really started at the beginning of May with difficulty walking.  She noticed some progression over the past several weeks involving the left leg, and left arm, and eventually presented to the emergency department for evaluation.  This is in contrast to the history given in the emergency department describing some days of symptoms-for what it is worth on my history her symptoms have been present for weeks.    She denies any change in swallowing, speaking, denies sensory changes, bladder changes.    Pertinent Past Medical/Surgical History  History reviewed. No pertinent past medical history.    History reviewed. No pertinent surgical history.    Medications:   Current Facility-Administered Medications   Medication Dose Route Frequency Provider Last Rate Last Admin    [START  "ON 6/19/2024] aspirin (ASA) chewable tablet 81 mg  81 mg Oral Daily Jonathon Jennings MD        atorvastatin (LIPITOR) tablet 80 mg  80 mg Oral QPM Jonathon Jennings MD        [START ON 6/19/2024] clopidogrel (PLAVIX) tablet 75 mg  75 mg Oral Daily Jonathon Jennings MD        lidocaine (LMX4) cream   Topical Q1H PRN Julianna Rinaldi MD        lidocaine 1 % 0.1-1 mL  0.1-1 mL Other Q1H PRN Julianna Rinaldi MD        medication instruction - No oral meds if patient didn't pass dysphagia screen   Does not apply Continuous PRN Julianna Rinaldi MD        Medication Instructions - Avoid dextrose in IV solutions.   Intravenous Continuous PRN Julianna Rinaldi MD        sodium chloride (PF) 0.9% PF flush 3 mL  3 mL Intracatheter Q8H Julianna Rinaldi MD   3 mL at 06/18/24 0541    sodium chloride (PF) 0.9% PF flush 3 mL  3 mL Intracatheter q1 min prn Julianna Rinaldi MD       .    Allergies:   Allergies   Allergen Reactions    Shellfish-Derived Products Anaphylaxis    Amoxicillin Rash    Contrave [Naltrexone-Bupropion Hcl Er] Unknown    Sulfamethoxazole-Trimethoprim [Sulfamethoxazole-Trimethoprim] Itching and Rash   .    Family History:   Family History   Problem Relation Age of Onset    Breast Cancer No family hx of     Ovarian Cancer No family hx of    .    Social History:   Social History     Tobacco Use    Smoking status: Never     Passive exposure: Never    Smokeless tobacco: Never   Substance Use Topics    Alcohol use: No   .    Tobacco use: Never        Physical Examination   Vitals: /73 (BP Location: Right arm)   Pulse 64   Temp 98.3  F (36.8  C) (Oral)   Resp 16   Ht 1.397 m (4' 7\")   Wt 61.6 kg (135 lb 12.9 oz)   LMP  (LMP Unknown)   SpO2 98%   BMI 31.56 kg/m    General: Adult patient, lying in bed, NAD  HEENT: NC/AT, no icterus, op pink and moist  Cardiac: RRR  Pulmonary: non-labored on RA  Gastointestinal: S/NT/ND  Musculoskeletal: Warm, no edema  Skin: No rash or lesion   Psychiatric: Mood " pleasant, affect congruent  Neurologic:  Mental status: Awake, alert, attentive, oriented to self, time, place, and circumstance. Language is fluent and coherent with intact comprehension of complex commands, naming and repetition.  Cranial nerves: VFF, PERRL, conjugate gaze, EOMI, facial sensation intact, face symmetric, shoulder shrug strong, tongue/uvula midline, no dysarthria.   Motor:   Mild pronator drift left upper extremity  Normal bulk and tone. No abnormal movements. 5/5 strength in 4/4 extremities.   Reflexes: Normal reflexic and symmetric biceps, brachioradialis, patellae, and achilles. Negative Morris, no clonus, toes down-going.  Sensory: Intact to light touch in all extremities  Coordination: FNF and HS without ataxia or dysmetria. Rapid alternating movements are slower in the left upper extremity relative to right  Gait: Able to stand independently, ambulates without ataxia      Labs  Labs and Imaging reviewed and used in developing the plan; pertinent results included.     Lab Results   Component Value Date    GLC 82 06/18/2024       A1c 5.8    MRI brain 6/17  IMPRESSION:  1.  Multifocal infarcts of varying ages in the right UZIEL and MCA territories with more acute infarcts in the right frontal and parietal lobes. Patchy areas of enhancement likely reflect subacute infarcts, however, continued attention on follow-up is   recommended to document expected resolution.  2.  No acute intracranial hemorrhage.    IMPRESSION:   HEAD CT:  1.  Small suspected infarct in the posterior right frontal lobe involving the precentral gyrus.  2.  No mass effect or hemorrhage.  3.  Chronic right frontal (UZIEL distribution) and right lentiform nucleus infarcts.     HEAD CTA:   1.  Extensive intracranial atherosclerosis with severe stenosis of the communicating segment of the right ICA and tandem severe stenosis at the carotid terminus, likely accounting for the above described infarct.  2.  Right A3 occlusion  corresponding to an area of chronic infarct.  3.  Additional multifocal severe stenoses and occlusions detailed above.     NECK CTA:  1.  No large vessel occlusion or hemodynamically significant stenosis.    ECHO 6/18/24  1.Left ventricular size, wall motion and function are normal. The ejection  fraction is 60-65%.  2.There is mild concentric left ventricular hypertrophy.  3.Normal right ventricle size and systolic function.  4.A contrast injection (Bubble Study) was performed that was negative for flow  across the interatrial septum.  5.No hemodynamically significant valvular abnormalities on 2D or color flow  imaging.  6.Mild Ascending Aorta dilatation is present, 40 mm.  There is no comparison study available.    Jonathon Jennings MD     Billed as a level 4 visit due to patient presenting by send acute on chronic problem (hypertension, hyperlipidemia, atherosclerosis leading to acute stroke)  threatening severe loss of bodily function.  I have reviewed notes from the ED staff, labs, MRI and vessel imaging as outlined above.

## 2024-06-18 NOTE — PLAN OF CARE
"Goal Outcome Evaluation:      Problem: Adult Inpatient Plan of Care  Goal: Patient-Specific Goal (Individualized)  Description: You can add care plan individualizations to a care plan. Examples of Individualization might be:  \"Parent requests to be called daily at 9am for status\", \"I have a hard time hearing out of my right ear\", or \"Do not touch me to wake me up as it startles  me\".  Outcome: Progressing     Problem: Adult Inpatient Plan of Care  Goal: Optimal Comfort and Wellbeing  Outcome: Progressing     Problem: Comorbidity Management  Goal: Blood Pressure in Desired Range  Outcome: Progressing    A/O x4. VSS. Tele NSR. NIH score zero. Denied pain. Stand by assist with ADLs. Good oral intake.                            "

## 2024-06-18 NOTE — CONSULTS
Care Management Initial Consult    General Information  Assessment completed with: Children, dtr Lay  Type of CM/SW Visit: Initial Assessment    Primary Care Provider verified and updated as needed: Yes   Readmission within the last 30 days:           Advance Care Planning: Advance Care Planning Reviewed:  (no HCD)          Communication Assessment  Patient's communication style: spoken language (English or Bilingual) (dtr bilingual)             Cognitive  Cognitive/Neuro/Behavioral: WDL  Level of Consciousness: alert     Orientation: oriented x 4        Speech: clear    Living Environment:   People in home: alone     Current living Arrangements: apartment      Able to return to prior arrangements: yes       Family/Social Support:  Care provided by: self  Provides care for: no one     Children          Description of Support System: Supportive, Involved         Current Resources:   Patient receiving home care services: No     Community Resources: None  Equipment currently used at home: none  Supplies currently used at home: None    Employment/Financial:  Employment Status:          Financial Concerns:             Does the patient's insurance plan have a 3 day qualifying hospital stay waiver?   Unsure has pmap insurance    Lifestyle & Psychosocial Needs:  Social Determinants of Health     Food Insecurity: Not on file   Depression: Not at risk (3/8/2024)    PHQ-2     PHQ-2 Score: 2   Housing Stability: Not on file   Tobacco Use: Low Risk  (6/17/2024)    Patient History     Smoking Tobacco Use: Never     Smokeless Tobacco Use: Never     Passive Exposure: Never   Financial Resource Strain: Not on file   Alcohol Use: Not on file   Transportation Needs: Not on file   Physical Activity: Not on file   Interpersonal Safety: Low Risk  (3/8/2024)    Interpersonal Safety     Do you feel physically and emotionally safe where you currently live?: Yes     Within the past 12 months, have you been hit, slapped, kicked or otherwise  physically hurt by someone?: No     Within the past 12 months, have you been humiliated or emotionally abused in other ways by your partner or ex-partner?: No   Stress: Not on file   Social Connections: Not on file   Health Literacy: Not on file       Functional Status:  Prior to admission patient needed assistance:   Dependent ADLs:: Independent  Dependent IADLs:: Independent         Additional Information:    Assessment completed over the phone with patient's daughter Marisol.  Patient lives alone in her apartment. She is independent with ADLs, assisted with some IADLs, ambulates without devices and no services in the home.  Lay is primary family contact and willing to transport at discharge.    Final discharge plan pending progression and recommendations.        Carmen Linton RN

## 2024-06-18 NOTE — PROGRESS NOTES
Patient A&Ox4, makes needs known. Vitally stable on room air. Tele; sinus bradycardia. Up ad delmy independently. No neuro changes. Patient transferred to P124. Report given to CONG Perdomo. Patient tolerated transfer.

## 2024-06-18 NOTE — ED PROVIDER NOTES
EMERGENCY DEPARTMENT ENCOUNTER      NAME: Rafael Colin  AGE: 54 year old female  YOB: 1970  MRN: [id number]  EVALUATION DATE & TIME: 2024  7:55 PM    PCP: Jerad Knight    ED PROVIDER: Teddy Valverde M.D.      Chief Complaint   Patient presents with    Extremity Weakness         FINAL IMPRESSION:  Left Sided Weakness      ED COURSE & MEDICAL DECISION MAKIN:40 PM Met with and introduced myself to the patient. Discussed history and plan of care.   10:13 PM spoke with the radiologist who felt there could be an acute or subacute stroke in the right MCA distribution.  There is no large vessel occlusion or anything which would be amenable to neurointervention.  I subsequently spoke to the stroke neurologist who reviewed the scan and at this time recommended MRI with and without contrast to better evaluate whether this is a mass or ischemic in nature.  Spoke with the patient and she still has weakness which is present in her left lower and upper extremity but it seems to have improved somewhat.  She remains with some pain.    Pertinent Labs & Imaging studies reviewed. (See chart for details)  54 year old female presents to the Emergency Department for evaluation of left-sided arm and leg weakness with associated significant pain.  History was incredibly difficult and even utilizing  and multiple pointed questions in different ways it was difficult to determine whether patient's weakness was secondary to pain or if the pain was an additional symptom not impacting the weakness.  Because the unclarity of the symptoms and the apparent time course did not call a stroke code as I thought not only with the patient outside of the time window but stroke was unlikely.  X-rays to rule out any sort of trauma or injury are all normal.  Blood work to rule out muscle breakdown or rhabdo are normal.  Electrolytes are all normal.  CTA of the head however shows acute versus subacute area of ischemia which be  consistent with a left-sided weakness.  Spoke with the stroke neurologist who recommended MRI with and without contrast to evaluate for whether or not this was mass or ischemia.  Will pursue MRI studies and the patient will likely be admitted.  Patient signed out to Dr. Campbell pending that MRI and probable admission.    At the conclusion of the encounter I discussed the results of all of the tests and the disposition. The questions were answered. The patient or family acknowledged understanding and was agreeable with the care plan.              Medical Decision Making  Obtained supplemental history:Supplemental history obtained?: Documented in chart  Reviewed external records: External records reviewed?: Documented in chart  Care impacted by chronic illness:Hyperlipidemia and Other: Lumbar back pain and obesity  Care significantly affected by social determinants of health:N/A  Did you consider but not order tests?: Work up considered but not performed and documented in chart, if applicable  Did you interpret images independently?: Independent interpretation of ECG and images noted in documentation, when applicable.  Consultation discussion with other provider:Did you involve another provider (consultant, MH, pharmacy, etc.)?: I discussed the care with another health care provider, see documentation for details.  Admission considered. Patient was signed out to the oncoming physician, disposition pending.    This patient involved a high degree of complexity in medical decision making, as significant risks were present and assessed. Recent encounters & results in medical record reviewed by me.     All workup (i.e. any EKG/labs/imaging as per charting below) reviewed and independently interpreted by me. See respective sections for details.       minutes of critical care time     MEDICATIONS GIVEN IN THE EMERGENCY:  Medications   ketorolac (TORADOL) injection 30 mg (30 mg Intravenous $Given 6/17/24 2057)   iopamidol  "(ISOVUE-370) solution 67 mL (67 mLs Intravenous $Given 6/17/24 7746)       NEW PRESCRIPTIONS STARTED AT TODAY'S ER VISIT  New Prescriptions    No medications on file          =================================================================    HPI    Patient information was obtained from: Patient    Use of : Yes in person on the phone, language - Claudia Colin is a 54 year old female with a pertinent history of hyperlipidemia, obesity, and lumbar back pain who presents for evaluation of extremity weakness.     History is not clear if it is pain or weakness.     The patient reports noticing left lower leg and left arm weakness a couple of days ago and woke up today with the weakness worsening and being unable to move. Along with this, the patient endorses \"deep inside\" pain in her whole left arm and left leg. Patient has had similar symptoms to this before and was prescribed a medication that improved her symptoms.     Denies neck pain and back pain     REVIEW OF SYSTEMS   Review of Systems   Musculoskeletal:  Negative for back pain and neck pain.   All other systems reviewed and are negative.       PAST MEDICAL HISTORY:  History reviewed. No pertinent past medical history.    PAST SURGICAL HISTORY:  History reviewed. No pertinent surgical history.        CURRENT MEDICATIONS:    No current outpatient medications on file.      ALLERGIES:  Allergies   Allergen Reactions    Shellfish-Derived Products Anaphylaxis    Amoxicillin Rash    Contrave [Naltrexone-Bupropion Hcl Er] Unknown    Sulfamethoxazole-Trimethoprim [Sulfamethoxazole-Trimethoprim] Itching and Rash       FAMILY HISTORY:  Family History   Problem Relation Age of Onset    Breast Cancer No family hx of     Ovarian Cancer No family hx of        SOCIAL HISTORY:   Social History     Socioeconomic History    Marital status:    Tobacco Use    Smoking status: Never     Passive exposure: Never    Smokeless tobacco: Never   Vaping Use    Vaping " "status: Never Used   Substance and Sexual Activity    Alcohol use: No    Drug use: No    Sexual activity: Never   Social History Narrative    [place]  [place]     Social Determinants of Health     Interpersonal Safety: Low Risk  (3/8/2024)    Interpersonal Safety     Do you feel physically and emotionally safe where you currently live?: Yes     Within the past 12 months, have you been hit, slapped, kicked or otherwise physically hurt by someone?: No     Within the past 12 months, have you been humiliated or emotionally abused in other ways by your partner or ex-partner?: No       VITALS:  BP (!) 160/91   Pulse 59   Temp 97.5  F (36.4  C)   Resp 20   Ht 1.397 m (4' 7\")   Wt 58.5 kg (129 lb)   LMP  (LMP Unknown)   SpO2 100%   BMI 29.98 kg/m        PHYSICAL EXAM    Constitutional: Well developed, Well nourished, NAD, GCS 15  HENT: Normocephalic, Atraumatic, Bilateral external ears normal, Oropharynx normal, mucous membranes moist, Nose normal. Neck-  Normal range of motion, No tenderness, Supple, No stridor.  Eyes: PERRL, EOMI, Conjunctiva normal, No discharge.   Respiratory: Normal breath sounds, No respiratory distress, No wheezing, Speaks full sentences easily. No cough.  Cardiovascular: Normal heart rate, Regular rhythm, No murmurs, No rubs, No gallops. Chest wall nontender.  GI:Soft, No tenderness, No masses, No flank tenderness. No rebound or guarding.    Musculoskeletal: 2+ DP pulses. No edema.No cyanosis, No clubbing. Good range of motion in all major joints. No tenderness to palpation or major deformities noted.   Integument: Warm, Dry, No erythema, No rash. No petechiae.   Neurologic: Alert & oriented x 3,  CN 3-12 intact Normal motor function but there is questionable weakness to left upper and left lower extremity, Normal sensory function, No focal deficits noted. Normal gait. Normal finger to nose bilaterally  Psychiatric: Affect normal, Judgment normal, Mood normal. Cooperative.        "   LAB:  All pertinent labs reviewed and interpreted.  Labs Ordered and Resulted from Time of ED Arrival to Time of ED Departure   COMPREHENSIVE METABOLIC PANEL - Abnormal       Result Value    Sodium 140      Potassium 4.3      Carbon Dioxide (CO2) 24      Anion Gap 11      Urea Nitrogen 11.5      Creatinine 0.60      GFR Estimate >90      Calcium 9.6      Chloride 105      Glucose 92      Alkaline Phosphatase 90      AST 23      ALT 26      Protein Total 8.6 (*)     Albumin 4.4      Bilirubin Total 0.4     CK TOTAL - Normal         CBC WITH PLATELETS AND DIFFERENTIAL    WBC Count 5.0      RBC Count 5.09      Hemoglobin 14.8      Hematocrit 45.1      MCV 89      MCH 29.1      MCHC 32.8      RDW 12.4      Platelet Count 266      % Neutrophils 61      % Lymphocytes 28      % Monocytes 8      % Eosinophils 3      % Basophils 1      % Immature Granulocytes 0      NRBCs per 100 WBC 0      Absolute Neutrophils 3.1      Absolute Lymphocytes 1.4      Absolute Monocytes 0.4      Absolute Eosinophils 0.1      Absolute Basophils 0.0      Absolute Immature Granulocytes 0.0      Absolute NRBCs 0.0         RADIOLOGY:  Reviewed all pertinent imaging. Please see official radiology report.  XR Knee Left 1/2 Views   Final Result   IMPRESSION: Normal joint spaces and alignment. No fracture or joint effusion.      XR Elbow Left 2 Views   Final Result   IMPRESSION: 2 views of the left shoulder  show mild AC joint arthrosis. There is no evidence of an acute fracture or dislocation. Glenohumeral joint space is maintained.       2 views of the left elbow show no evidence of an acute fracture. Joint spaces are maintained. No joint effusion.      XR Shoulder Left 2 Views   Final Result   IMPRESSION: 2 views of the left shoulder  show mild AC joint arthrosis. There is no evidence of an acute fracture or dislocation. Glenohumeral joint space is maintained.       2 views of the left elbow show no evidence of an acute fracture. Joint  spaces are maintained. No joint effusion.      CTA Head Neck with Contrast   Final Result   IMPRESSION:    HEAD CT:   1.  Small suspected infarct in the posterior right frontal lobe involving the precentral gyrus.   2.  No mass effect or hemorrhage.   3.  Chronic right frontal (UZIEL distribution) and right lentiform nucleus infarcts.      HEAD CTA:    1.  Extensive intracranial atherosclerosis with severe stenosis of the communicating segment of the right ICA and tandem severe stenosis at the carotid terminus, likely accounting for the above described infarct.   2.  Right A3 occlusion corresponding to an area of chronic infarct.   3.  Additional multifocal severe stenoses and occlusions detailed above.      NECK CTA:   1.  No large vessel occlusion or hemodynamically significant stenosis.      Dr. Valverde was contacted by me on 6/17/2024 10:00 PM CDT with the preliminary report.      XR Hip Left 2-3 Views   Final Result   IMPRESSION: Left hip joint space is maintained. No evidence of an acute fracture or dislocation. Mild acetabular dysplasia bilaterally. Contrast in the urinary collecting system.      MR Brain w/o & w Contrast    (Results Pending)           I, Farida Samano, am serving as a scribe to document services personally performed by Dr. Teddy Valverde based on my observation and the provider's statements to me. I, Teddy Valverde MD attest that Farida Samano is acting in a scribe capacity, has observed my performance of the services and has documented them in accordance with my direction.    Teddy Valverde M.D.  Emergency Medicine  [facility] EMERGENCY DEPARTMENT  [facility] [facility] [id number] [phone]  Dept: [phone]       Teddy Valverde MD  06/17/24 1398

## 2024-06-19 ENCOUNTER — APPOINTMENT (OUTPATIENT)
Dept: OCCUPATIONAL THERAPY | Facility: HOSPITAL | Age: 54
End: 2024-06-19
Payer: COMMERCIAL

## 2024-06-19 ENCOUNTER — APPOINTMENT (OUTPATIENT)
Dept: PHYSICAL THERAPY | Facility: HOSPITAL | Age: 54
End: 2024-06-19
Payer: COMMERCIAL

## 2024-06-19 VITALS
DIASTOLIC BLOOD PRESSURE: 79 MMHG | BODY MASS INDEX: 31.43 KG/M2 | RESPIRATION RATE: 18 BRPM | TEMPERATURE: 98.3 F | WEIGHT: 135.8 LBS | SYSTOLIC BLOOD PRESSURE: 126 MMHG | OXYGEN SATURATION: 98 % | HEART RATE: 60 BPM | HEIGHT: 55 IN

## 2024-06-19 LAB
ANION GAP SERPL CALCULATED.3IONS-SCNC: 14 MMOL/L (ref 7–15)
BUN SERPL-MCNC: 15.4 MG/DL (ref 6–20)
CALCIUM SERPL-MCNC: 9.8 MG/DL (ref 8.6–10)
CHLORIDE SERPL-SCNC: 103 MMOL/L (ref 98–107)
CREAT SERPL-MCNC: 0.63 MG/DL (ref 0.51–0.95)
DEPRECATED HCO3 PLAS-SCNC: 22 MMOL/L (ref 22–29)
EGFRCR SERPLBLD CKD-EPI 2021: >90 ML/MIN/1.73M2
ERYTHROCYTE [DISTWIDTH] IN BLOOD BY AUTOMATED COUNT: 12.1 % (ref 10–15)
GLUCOSE BLDC GLUCOMTR-MCNC: 107 MG/DL (ref 70–99)
GLUCOSE SERPL-MCNC: 116 MG/DL (ref 70–99)
HCT VFR BLD AUTO: 45.3 % (ref 35–47)
HGB BLD-MCNC: 15.2 G/DL (ref 11.7–15.7)
MCH RBC QN AUTO: 29.3 PG (ref 26.5–33)
MCHC RBC AUTO-ENTMCNC: 33.6 G/DL (ref 31.5–36.5)
MCV RBC AUTO: 88 FL (ref 78–100)
PLATELET # BLD AUTO: 298 10E3/UL (ref 150–450)
POTASSIUM SERPL-SCNC: 4.3 MMOL/L (ref 3.4–5.3)
RBC # BLD AUTO: 5.18 10E6/UL (ref 3.8–5.2)
SODIUM SERPL-SCNC: 139 MMOL/L (ref 135–145)
WBC # BLD AUTO: 4.2 10E3/UL (ref 4–11)

## 2024-06-19 PROCEDURE — 80048 BASIC METABOLIC PNL TOTAL CA: CPT | Performed by: FAMILY MEDICINE

## 2024-06-19 PROCEDURE — 250N000013 HC RX MED GY IP 250 OP 250 PS 637: Performed by: INTERNAL MEDICINE

## 2024-06-19 PROCEDURE — 250N000013 HC RX MED GY IP 250 OP 250 PS 637: Performed by: STUDENT IN AN ORGANIZED HEALTH CARE EDUCATION/TRAINING PROGRAM

## 2024-06-19 PROCEDURE — 36415 COLL VENOUS BLD VENIPUNCTURE: CPT | Performed by: FAMILY MEDICINE

## 2024-06-19 PROCEDURE — 97530 THERAPEUTIC ACTIVITIES: CPT | Mod: GO

## 2024-06-19 PROCEDURE — 85027 COMPLETE CBC AUTOMATED: CPT | Performed by: FAMILY MEDICINE

## 2024-06-19 PROCEDURE — 97110 THERAPEUTIC EXERCISES: CPT | Mod: GP

## 2024-06-19 PROCEDURE — 97116 GAIT TRAINING THERAPY: CPT | Mod: GP

## 2024-06-19 PROCEDURE — 99239 HOSP IP/OBS DSCHRG MGMT >30: CPT | Performed by: INTERNAL MEDICINE

## 2024-06-19 PROCEDURE — 97535 SELF CARE MNGMENT TRAINING: CPT | Mod: GO

## 2024-06-19 RX ORDER — CLOPIDOGREL BISULFATE 75 MG/1
75 TABLET ORAL DAILY
Qty: 92 TABLET | Refills: 0 | Status: SHIPPED | OUTPATIENT
Start: 2024-06-20 | End: 2024-06-24

## 2024-06-19 RX ORDER — ATORVASTATIN CALCIUM 80 MG/1
80 TABLET, FILM COATED ORAL EVERY EVENING
Qty: 60 TABLET | Refills: 1 | Status: SHIPPED | OUTPATIENT
Start: 2024-06-19 | End: 2024-06-24

## 2024-06-19 RX ORDER — ASPIRIN 81 MG/1
81 TABLET, CHEWABLE ORAL DAILY
Qty: 60 TABLET | Refills: 1 | Status: SHIPPED | OUTPATIENT
Start: 2024-06-20 | End: 2024-06-24

## 2024-06-19 RX ADMIN — LIDOCAINE 1 PATCH: 4 PATCH TOPICAL at 00:09

## 2024-06-19 RX ADMIN — ASPIRIN 81 MG CHEWABLE TABLET 81 MG: 81 TABLET CHEWABLE at 10:07

## 2024-06-19 RX ADMIN — CLOPIDOGREL BISULFATE 75 MG: 75 TABLET ORAL at 10:07

## 2024-06-19 ASSESSMENT — ACTIVITIES OF DAILY LIVING (ADL)
ADLS_ACUITY_SCORE: 23
ADLS_ACUITY_SCORE: 22
ADLS_ACUITY_SCORE: 23
ADLS_ACUITY_SCORE: 22
ADLS_ACUITY_SCORE: 23
ADLS_ACUITY_SCORE: 23
ADLS_ACUITY_SCORE: 22
ADLS_ACUITY_SCORE: 23
ADLS_ACUITY_SCORE: 22

## 2024-06-19 NOTE — PROGRESS NOTES
POST MIDNIGHT ADMISSION :       Monitor neuro status  Continue current meds   Neurology following    Disposition needs to be addressed : acute rehab versus home

## 2024-06-19 NOTE — PHARMACY-CONSULT NOTE
Pharmacy Consult to evaluate for medication related stroke core measures    Paw Cristi, 54 year old female admitted for multiple acute and subacute CVAs.    on 6/17/2024.    Thrombolytic was not given because of Time from onset contraindications    VTE Prophylaxis SCDs /PCDs placed on 6/18/24 @ 0400, as appropriate prior to end of hospital day 2.    Antithrombotic: aspirin and clopidogrel started on 6/18/24, as appropriate by end of hospital day 2. Continue antithrombotic therapy on discharge to meet quality measures, unless contraindicated.    Anticoagulation if history of A-fib/flutter: Patient does not have history of A-fib/flutter - anticoagulation not required for medication related stroke core measures.     LDL Cholesterol Calculated   Date Value Ref Range Status   06/18/2024 194 (H) <=100 mg/dL Final       Patient currently receiving Lipitor (atorvastatin) continue statin on discharge to meet quality measures, unless contraindicated.    Recommendations: None at this time    Thank you for the consult.    Oriana Arreola, PharmD, BCCP 6/19/2024 12:19 PM

## 2024-06-19 NOTE — PLAN OF CARE
Patient requested lidocaine patches for lower back pain. Daughter at bedside and assisted with communication.     Problem: Adult Inpatient Plan of Care  Goal: Plan of Care Review  Description: The Plan of Care Review/Shift note should be completed every shift.  The Outcome Evaluation is a brief statement about your assessment that the patient is improving, declining, or no change.  This information will be displayed automatically on your shift  note.  Outcome: Progressing     Problem: Adult Inpatient Plan of Care  Goal: Absence of Hospital-Acquired Illness or Injury  Outcome: Progressing  Intervention: Identify and Manage Fall Risk  Recent Flowsheet Documentation  Taken 6/18/2024 2300 by Julianna Dior RN  Safety Promotion/Fall Prevention:   assistive device/personal items within reach   clutter free environment maintained   nonskid shoes/slippers when out of bed   patient and family education   room near nurse's station  Intervention: Prevent Skin Injury  Recent Flowsheet Documentation  Taken 6/18/2024 2300 by Julianna Dior RN  Body Position: position changed independently  Intervention: Prevent and Manage VTE (Venous Thromboembolism) Risk  Recent Flowsheet Documentation  Taken 6/18/2024 2300 by Julianna Dior RN  VTE Prevention/Management: (ambulating) patient refused intervention     Problem: Comorbidity Management  Goal: Blood Pressure in Desired Range  Outcome: Progressing  Intervention: Maintain Blood Pressure Management  Recent Flowsheet Documentation  Taken 6/18/2024 2300 by Julianna Dior RN  Medication Review/Management: medications reviewed     Problem: Stroke, Ischemic (Includes Transient Ischemic Attack)  Goal: Optimal Cerebral Tissue Perfusion  Outcome: Progressing

## 2024-06-19 NOTE — PROGRESS NOTES
Care Management Discharge Note    Discharge Date: 06/19/2024       Discharge Disposition: Home, Home Care    Discharge Services: Home Care    Discharge DME: None    Discharge Transportation: family or friend will provide    Private pay costs discussed: Not applicable    Does the patient's insurance plan have a 3 day qualifying hospital stay waiver?  No    PAS Confirmation Code:    Patient/family educated on Medicare website which has current facility and service quality ratings: yes    Education Provided on the Discharge Plan: Yes  Persons Notified of Discharge Plans: Patient - Per Team   Patient/Family in Agreement with the Plan: yes    Handoff Referral Completed: Yes    Additional Information:  Patient to discharge home with homecare [facility] PT OT services. Family transport.     Jenny Santillan RN

## 2024-06-19 NOTE — PLAN OF CARE
Problem: Fall Injury Risk  Goal: Absence of Fall and Fall-Related Injury  Outcome: Progressing     Problem: Stroke, Ischemic (Includes Transient Ischemic Attack)  Goal: Optimal Coping  Outcome: Progressing   Goal Outcome Evaluation:      Plan of Care Reviewed With: patient      NSR on tele. , and 121.  utilized for assessment, daughter came late in shift and is staying the night, can translate. Patient does understand some english. VSS. NIH score 0. Saline locked. Would like to go home tomorrow. Steady on feet.

## 2024-06-19 NOTE — PROGRESS NOTES
Care Management Follow Up    Length of Stay (days): 1    Expected Discharge Date: 06/22/2024    Anticipated Discharge Plan:       Transportation: Anticipate Family/friend    PT Recommendations: Acute Rehab Center-Motivated patient will benefit from intensive, interdisciplinary therapy.  Anticipate will be able to tolerate 3 hours of therapy per day  OT Recommendations:  home with assist, home with home care occupational therapy     Barriers to Discharge: medical stability    Prior Living Situation: apartment with alone    Advanced Directive on File:  (no HCD)     Patient/Spokesperson Updated: Yes. Who? Daughter- Lay    Additional Information:  RNCM placed call to patient's daughter Lay, discussed discharge plan. Declines ARU/TUC. Requesting homecare PT OT. Referral sent.    CM will continue to follow care progression and aide in discharge planning as needed.     Jenny Santillan RN

## 2024-06-19 NOTE — PLAN OF CARE
Occupational Therapy Discharge Summary    Reason for therapy discharge:    Discharged to home.    Progress towards therapy goal(s). See goals on Care Plan in [facility] electronic health record for goal details.  Goals met  Renita MENESES, OTR/L, CLT 6/19/2024 , 4:09 PM      Goal Outcome Evaluation:

## 2024-06-19 NOTE — PLAN OF CARE
Problem: Adult Inpatient Plan of Care  Goal: Plan of Care Review  Description: The Plan of Care Review/Shift note should be completed every shift.  The Outcome Evaluation is a brief statement about your assessment that the patient is improving, declining, or no change.  This information will be displayed automatically on your shift  note.  Outcome: Progressing   Goal Outcome Evaluation:       Plan of care discussed with daughter. Awaiting neurology and hospitalist visit.  Problem: Adult Inpatient Plan of Care  Goal: Optimal Comfort and Wellbeing  Intervention: Monitor Pain and Promote Comfort  Recent Flowsheet Documentation  Taken 6/19/2024 0900 by Quiana Freeman RN  Pain Management Interventions: medication (see MAR)     Lido. Patch on upper back for mild discomfort. Will remove at 1200.  Problem: Stroke, Ischemic (Includes Transient Ischemic Attack)  Goal: Optimal Functional Ability  Intervention: Optimize Functional Ability  Recent Flowsheet Documentation  Taken 6/19/2024 0900 by Quiana Freeman RN  Activity Management:   activity adjusted per tolerance   up ad delmy         N.I.H.S.S. 0. Denies any left extremity weakness. Some decrease in sensation.indep. in room.

## 2024-06-19 NOTE — DISCHARGE SUMMARY
"[facility]  Hospitalist Discharge Summary      Date of Admission:  6/17/2024  Date of Discharge:  6/19/2024  Discharging Provider: Chris Hernandez MD  Discharge Service: Hospitalist Service    Discharge Diagnoses         Rafael Colin is a 54 year old female with PMH significant for hypertension, hyperlipidemia and chronic low back pain who is admitted on 6/18/2024 with multiple acute and subacute CVAs.         6/19 :       Medically stable  Discharge home today with home pt/ot        A/p :       #Acute stroke, right UZIEL and MCA territories  #Intracranial atherosclerosis  #Hyperlipidemia  #Hypertension    Acute Ischemic Stroke (without tPA) Plan  Goal of normotension, less than 130/80 in the long-term  Daily aspirin 81 mg for secondary stroke prevention  Plavix 75 mg daily for 3 months, ending September 18, 2024  Statin: Atorvastatin 80 daily  MRI Stroke Protocol, CT/CTA head and neck : \" Multifocal infarcts of various ages in the right UZIEL and MCA territories with more acute infarcts in the right frontal and parietal lobes.... subacute infarcts\" noted as well....  No acute intracranial hemorrhage\".  CTA head shows extensive intracranial atherosclerosis with severe stenosis of communicating segments of the right ICA and tandem severe stenosis at carotid terminus.  Right A3 occlusion corresponding to an area of chronic infarct.  Additional multifocal severe stenosis also noted.  CTA neck no hemodynamically significant stenosis  Echo : normal  Stroke work up completed  Clinically stable  Stroke neurology follow-up in 2 to 3 months        # HTN, Essential- Allow permissive hypertension while stroke workup in progress.  Recommend labetalol if BP >220/120. improved     # Hyperlipidemia- Follow up lipid panel.     # Chronic low back pain- Patient described back pain as muscle spasm.  Trial of Flexeril.           Clinically Significant Risk Factors     # Obesity: Estimated body mass index is 31.56 kg/m  as " "calculated from the following:    Height as of this encounter: 1.397 m (4' 7\").    Weight as of this encounter: 61.6 kg (135 lb 12.9 oz).       Follow-ups Needed After Discharge   Follow-up Appointments     Follow-up and recommended labs and tests       Follow up with primary care provider, Jerad Knight, within 7 days for   hospital follow- up.  No follow up labs or test are needed.    Stroke neurology follow-up in 2 to 3 months        {Additional follow-up instructions/to-do's for PCP    : none    Unresulted Labs Ordered in the Past 30 Days of this Admission       No orders found from 5/18/2024 to 6/18/2024.        These results will be followed up by pcp    Discharge Disposition   Discharged to home  Condition at discharge: Stable        Consultations This Hospital Stay   NEUROLOGY IP STROKE CONSULT  SPEECH LANGUAGE PATH ADULT IP CONSULT  PHARMACY IP CONSULT  PHARMACY IP CONSULT  PHARMACY IP CONSULT  PHYSICAL THERAPY ADULT IP CONSULT  OCCUPATIONAL THERAPY ADULT IP CONSULT  REHAB ADMISSIONS LIAISON IP CONSULT  CARE MANAGEMENT / SOCIAL WORK IP CONSULT  NEUROLOGY IP CONSULT  SMOKING CESSATION PROGRAM IP CONSULT    Code Status   Full Code    Time Spent on this Encounter   I, Chris Hernandez MD, personally saw the patient today and spent greater than 30 minutes discharging this patient.       Chris Hernandez MD  [address] [address]  Phone: [phone]  Fax: [phone]  ______________________________________________________________________    Physical Exam   Vital Signs: Temp: 98.3  F (36.8  C) Temp src: Oral BP: 126/79 Pulse: 60   Resp: 18 SpO2: 98 % O2 Device: None (Room air)    Weight: 135 lbs 12.85 oz      General Appearance:  Awake, alert, flat affect  Respiratory: CTAB  Cardiovascular: Regular rate, S1-S2  GI: +BS, soft, nontender, nondistended  Skin: No rash, no jaundice  Neuro: Neuro examination limited due to language barrier.  Even with the use of " an , difficult to ascertain whether patient is not following some commands or does not understand what she is being asked to do.  Cranial nervesII-XII grossly intact.  Strength 4/5 left upper extremity and left lower extremity, 5/5 right upper extremity, right lower extremity.  Other:  No pedal edema          Primary Care Physician   Jerad Knight    Discharge Orders      Adult Neurology [facility] Referral      Home Care Referral      Reason for your hospital stay    Acute stroke     Follow-up and recommended labs and tests     Follow up with primary care provider, Jerad Knight, within 7 days for hospital follow- up.  No follow up labs or test are needed.    Stroke neurology follow-up in 2 to 3 months     Activity    Your activity upon discharge: activity as tolerated     Diet    Follow this diet upon discharge: Orders Placed This Encounter      Room Service      Low Saturated Fat Na <2400 mg       Significant Results and Procedures   Most Recent 3 CBC's:  Recent Labs   Lab Test 06/19/24 0718 06/17/24 2046 07/18/23  1125   WBC 4.2 5.0 4.6   HGB 15.2 14.8 14.3   MCV 88 89 86    266 303     Most Recent 3 BMP's:  Recent Labs   Lab Test 06/19/24  0752 06/19/24 0718 06/18/24  2135 06/18/24  0651 06/17/24 2046 07/18/23  1125   NA  --  139  --   --  140 143   POTASSIUM  --  4.3  --   --  4.3 4.5   CHLORIDE  --  103  --   --  105 107   CO2  --  22  --   --  24 26   BUN  --  15.4  --   --  11.5 8.2   CR  --  0.63  --   --  0.60 0.67   ANIONGAP  --  14  --   --  11 10   ROXANNE  --  9.8  --   --  9.6 9.4   * 116* 121*   < > 92 89    < > = values in this interval not displayed.     Most Recent 2 LFT's:  Recent Labs   Lab Test 06/17/24 2046 07/18/23  1125   AST 23 26   ALT 26 20   ALKPHOS 90 89   BILITOTAL 0.4 0.5     Most Recent 3 INR's:No lab results found.    Discharge Medications   Current Discharge Medication List        START taking these medications    Details   aspirin (ASA) 81 MG chewable tablet  Take 1 tablet (81 mg) by mouth daily  Qty: 60 tablet, Refills: 1    Associated Diagnoses: Multiple cerebral infarctions (H)      atorvastatin (LIPITOR) 80 MG tablet Take 1 tablet (80 mg) by mouth every evening  Qty: 60 tablet, Refills: 1    Associated Diagnoses: Multiple cerebral infarctions (H)      clopidogrel (PLAVIX) 75 MG tablet Take 1 tablet (75 mg) by mouth daily for 92 days  Qty: 92 tablet, Refills: 0    Associated Diagnoses: Multiple cerebral infarctions (H)           Allergies   Allergies   Allergen Reactions    Shellfish-Derived Products Anaphylaxis    Amoxicillin Rash    Contrave [Naltrexone-Bupropion Hcl Er] Unknown    Sulfamethoxazole-Trimethoprim [Sulfamethoxazole-Trimethoprim] Itching and Rash

## 2024-06-20 ENCOUNTER — PATIENT OUTREACH (OUTPATIENT)
Dept: CARE COORDINATION | Facility: CLINIC | Age: 54
End: 2024-06-20
Payer: COMMERCIAL

## 2024-06-20 ENCOUNTER — PATIENT OUTREACH (OUTPATIENT)
Dept: NURSING | Facility: CLINIC | Age: 54
End: 2024-06-20
Payer: COMMERCIAL

## 2024-06-20 ASSESSMENT — ACTIVITIES OF DAILY LIVING (ADL): DEPENDENT_IADLS:: INDEPENDENT

## 2024-06-20 NOTE — LETTER
[facility]  Patient Centered Plan of Care  About Me:        Patient Name:  Rafael Colin    YOB: 1970  Age:         54 year old   Maddi MRN:    [id number] Telephone Information:  Home Phone [phone]   Mobile [phone]       Address:  [address] [place] [address] Email address:  No e-mail address on record      Emergency Contact(s)    Name Relationship Lgl Grd Work Phone Home Phone Mobile Phone   1. RAFAEL,LAY Daughter    [phone]           Primary language:  Claudia     needed? Yes   [employer]:  [phone] op. 1  Other communication barriers:Language barrier; Lack of coping    Preferred Method of Communication:     Current living arrangement: I live in a private home with family    Mobility Status/ Medical Equipment: Independent w/Device        Health Maintenance  Health Maintenance Reviewed: Not assessed      My Access Plan  Medical Emergency 911   Primary Clinic Line [facility] [phone]   24 Hour Appointment Line [phone] or  [phone]) (toll-free)   24 Hour Nurse Line [phone] (toll-free)   Preferred Urgent Care [facility] [phone]     Preferred Hospital [facility]  [phone]     Preferred Pharmacy [facility] - [address]     Behavioral Health Crisis Line The National Suicide Prevention Lifeline at 1-497.528.7506 or Text/Call 268           My Care Team Members  Patient Care Team         Relationship Specialty Notifications Start End    Jerad Knight MD PCP - General Family Practice  1/14/19     Phone: [phone] Fax: [phone]         [address] [place] [address]    Jerad Knight MD Assigned PCP   2/25/23     Phone: [phone] Fax: [phone]         [address] [place] [address]    Lona Longoria, RN Lead Care Coordinator Primary Care - [facility] Admissions 6/20/24     Phone: [phone]          Karsten Lewis CHW Community Health Worker Primary Care - [facility] Admissions 6/20/24     Phone: [phone] Fax: [phone]         [address] [place] [address]                My Care Plans  Self Management and Treatment Plan    Care Plan  Care Plan: Establish care       Problem: est care       Goal: Patient would like to establish care with a new PCP in the next 90 days.       Start Date: 6/20/2024 Expected End Date: 9/30/2024    This Visit's Progress: 20%    Note:     Barriers: language barrier, low literacy, noncompliance, and lack of knowledge how to navigate complex health care system  Strengths: motivated to attend appt  Patient expressed understanding of goal: Yes    Action steps to achieve this goal:  1. I will answer my phone when I am contacted to schedule my appointment.  2. I will attend my establish care appointment as scheduled on 8/22/2024 at 1:40pm.   3. I will schedule a follow up appointment with my PCP if it is recommended to do so while I am at the clinic.  4. I will follow up with [facility] regarding this goal at each outreach until it is completed.                               Care Plan: Neurology       Problem: CVA       Goal: Patient will attend her neurology appointment in the next 6 months.       Start Date: 6/20/2024 Expected End Date: 12/31/2024    This Visit's Progress: 10%    Note:     Barriers: language barrier, low literacy, noncompliance, and lack of knowledge how to navigate complex health care system  Strengths: motivated to attend appt  Patient expressed understanding of goal: Yes    Action steps to achieve this goal:  1. I will answer my phone when I am contacted to schedule my appointment.  2. I will attend my neurology appointment as scheduled on 8/24/24 at 2:15pm.   3. I will schedule a follow up appointment with my neurologist if it is recommended to do so while I am at the clinic.  4. I will follow up with [facility] regarding this goal at each outreach until it is  completed.                                 Action Plans on File:                       Advance Care Plans/Directives:   Advanced Care Plan/Directives on file:   No    Discussed with patient/caregiver(s):   Declined Further Information             My Medical and Care Information  Problem List   Patient Active Problem List   Diagnosis    Hyperlipidemia LDL goal <130    Neck Cervical Mass Left (___cm)    Allergic rhinitis    Vitamin D Deficiency    Obesity    Deafness in left ear    Lumbar back pain    Left-sided weakness    Multiple cerebral infarctions (H)    Intracranial atherosclerosis    H/O Rt UZIEL & MCA stroke    Prediabetes      Current Medications and Allergies:  See printed Medication Report.    Care Coordination Start Date: 6/20/2024   Frequency of Care Coordination: No data recorded   Form Last Updated: 07/03/2024

## 2024-06-20 NOTE — PROGRESS NOTES
Clinic Care Coordination Contact  [facility]/Voicemail    Clinical Data: Care Coordinator Outreach    Outreach Documentation Number of Outreach Attempt   6/20/2024   9:47 AM 1       Left message on patient's voicemail with call back information and requested return call.    Plan: Care Coordinator will try to reach patient again in 1-2 business days.

## 2024-06-20 NOTE — PROGRESS NOTES
Clinic Care Coordination Contact  Clinic Care Coordination Contact  OUTREACH    Referral Information:  Referral Source: IP Handoff    Primary Diagnosis: Neurological Disorders    Chief Complaint   Patient presents with    Clinic Care Coordination - Initial     Clinic Utilization  Difficulty keeping appointments:: No  Compliance Concerns: No  No-Show Concerns: No  No PCP office visit in Past Year: No  Utilization      No Show Count (past year)  1             ED Visits  1             Hospital Admissions  1                    Current as of: 6/20/2024 12:51 PM            Clinical Concerns:  CCRN completed initial assessment with patient via phone today. Patient was hospitalized at [facility] from 6/17/2024 to 6/19/2024 with left sided multiple cerebral infarctions. Patient states she wasn't feeling well and had legs pain since March, 2024. She couldn't work any longer due to pain so she quit her job in March, 24. Patient lives alone but 1 daughter live in [place]. They don't have much close relationship. 1 child live in [place]. Patient tried to call her daughter Marisol Hall who lives in [place] during our call today to assist her apply for county benefits but daughter was short with patient.     [place] came [place] and had been working since 2008 up until March, 2024. Patient lives alone. Patient plans to go back to work if she's allow. Patient plans to follow up with a neurologist first. She will ask if it safe for her to go back to work at [employer]. Patient will consider to apply for disability if she was told not able to go back to work. Patient would like to apply for [facility] benefits such as rental assistance and SNAP. Patient would like to explore if she could qualify for PCA service. Patient reports she's using a walker and able to walk slowly around her apartment.     Patient is scheduled to see a neurologist on 6/24/2204. Reminded pt of appt.     Assisted patient scheduled to establish care with   Jessie on 8/22/24 and hospital follow up with Dr Oneil on 7/5/2024.     Will need to complete another eval with patient. She was too overwhelmed today.     Pain  Pain (GOAL):: No  Health Maintenance Reviewed: Not assessed  Clinical Pathway: None    Medication Management:  Medication review status: Medications reviewed and no changes reported per patient.           Functional Status:  Dependent ADLs:: Independent  Dependent IADLs:: Independent  Bed or wheelchair confined:: No  Mobility Status: Independent w/Device  Fallen 2 or more times in the past year?: No  Any fall with injury in the past year?: No    Living Situation:  Current living arrangement:: I live in a private home with family  Type of residence:: Apartment    Lifestyle & Psychosocial Needs:    Social Determinants of Health     Food Insecurity: Not on file   Depression: Not at risk (3/8/2024)    PHQ-2     PHQ-2 Score: 2   Housing Stability: Not on file   Tobacco Use: Low Risk  (6/17/2024)    Patient History     Smoking Tobacco Use: Never     Smokeless Tobacco Use: Never     Passive Exposure: Never   Financial Resource Strain: Not on file   Alcohol Use: Not on file   Transportation Needs: Not on file   Physical Activity: Not on file   Interpersonal Safety: Low Risk  (3/8/2024)    Interpersonal Safety     Do you feel physically and emotionally safe where you currently live?: Yes     Within the past 12 months, have you been hit, slapped, kicked or otherwise physically hurt by someone?: No     Within the past 12 months, have you been humiliated or emotionally abused in other ways by your partner or ex-partner?: No   Stress: Not on file   Social Connections: Not on file   Health Literacy: Not on file     Diet:: Regular  Inadequate nutrition (GOAL):: No  Tube Feeding: No  Inadequate activity/exercise (GOAL):: Yes  Significant changes in sleep pattern (GOAL): No  Transportation means:: Regular car, Medical transport     Hindu or spiritual beliefs that impact  treatment:: No  Mental health DX:: No  Mental health management concern (GOAL):: No  Chemical Dependency Status: No Current Concerns      Resources and Interventions:  Current Resources:      Community Resources: None  Supplies Currently Used at Home: None  Equipment Currently Used at Home: none  Employment Status: unemployed     Advance Care Plan/Directive  Advanced Care Plans/Directives on file:: No  Discussed with patient/caregiver:: Declined Further Information    Referrals Placed: None     Care Plan:  Care Plan: Establish care       Problem: est care       Goal: Patient would like to establish care with a new PCP in the next 90 days.       Start Date: 6/20/2024 Expected End Date: 9/30/2024    This Visit's Progress: 20%    Note:     Barriers: language barrier, low literacy, noncompliance, and lack of knowledge how to navigate complex health care system  Strengths: motivated to attend appt  Patient expressed understanding of goal: Yes    Action steps to achieve this goal:  1. I will answer my phone when I am contacted to schedule my appointment.  2. I will attend my establish care appointment as scheduled on 8/22/2024 at 1:40pm.   3. I will schedule a follow up appointment with my PCP if it is recommended to do so while I am at the clinic.  4. I will follow up with [facility] regarding this goal at each outreach until it is completed.                               Care Plan: Neurology       Problem: CVA       Goal: Patient will attend her neurology appointment in the next 6 months.       Start Date: 6/20/2024 Expected End Date: 12/31/2024    This Visit's Progress: 10%    Note:     Barriers: language barrier, low literacy, noncompliance, and lack of knowledge how to navigate complex health care system  Strengths: motivated to attend appt  Patient expressed understanding of goal: Yes    Action steps to achieve this goal:  1. I will answer my phone when I am contacted to schedule my appointment.  2. I will attend my  neurology appointment as scheduled on 8/24/24 at 2:15pm.   3. I will schedule a follow up appointment with my neurologist if it is recommended to do so while I am at the clinic.  4. I will follow up with [facility] regarding this goal at each outreach until it is completed.                                    Future Appointments                Today [facility]ONNELL [facility] KEERTHI Salazar    In 2 weeks Abhi Oneil MD M [facility] KEERTHI Salazar    In 2 months Kristi Adams MD M [facility] KEERTHI Salazar            Plan: Patient will attend neurology appt on 6/24/2204.

## 2024-06-20 NOTE — PLAN OF CARE
Physical Therapy Discharge Summary    Reason for therapy discharge:    Discharged to home with home therapy.    Progress towards therapy goal(s). See goals on Care Plan in [facility] electronic health record for goal details.  Goals met    Therapy recommendation(s):    Continued therapy is recommended.  Rationale/Recommendations:  to improve overall strength and functional mobility.

## 2024-06-24 ENCOUNTER — OFFICE VISIT (OUTPATIENT)
Dept: NEUROLOGY | Facility: CLINIC | Age: 54
End: 2024-06-24
Attending: STUDENT IN AN ORGANIZED HEALTH CARE EDUCATION/TRAINING PROGRAM
Payer: COMMERCIAL

## 2024-06-24 VITALS
HEART RATE: 61 BPM | WEIGHT: 137 LBS | HEIGHT: 55 IN | SYSTOLIC BLOOD PRESSURE: 150 MMHG | BODY MASS INDEX: 31.7 KG/M2 | DIASTOLIC BLOOD PRESSURE: 93 MMHG

## 2024-06-24 DIAGNOSIS — Z86.73 HISTORY OF STROKE: ICD-10-CM

## 2024-06-24 DIAGNOSIS — I67.2 INTRACRANIAL ATHEROSCLEROSIS: Primary | ICD-10-CM

## 2024-06-24 DIAGNOSIS — R53.1 LEFT-SIDED WEAKNESS: ICD-10-CM

## 2024-06-24 DIAGNOSIS — I63.9 MULTIPLE CEREBRAL INFARCTIONS (H): ICD-10-CM

## 2024-06-24 PROBLEM — R73.03 PREDIABETES: Status: ACTIVE | Noted: 2024-06-24

## 2024-06-24 PROCEDURE — G2211 COMPLEX E/M VISIT ADD ON: HCPCS | Performed by: PSYCHIATRY & NEUROLOGY

## 2024-06-24 PROCEDURE — 99205 OFFICE O/P NEW HI 60 MIN: CPT | Performed by: PSYCHIATRY & NEUROLOGY

## 2024-06-24 RX ORDER — ASPIRIN 81 MG/1
81 TABLET, CHEWABLE ORAL DAILY
Qty: 90 TABLET | Refills: 3 | Status: SHIPPED | OUTPATIENT
Start: 2024-06-24

## 2024-06-24 RX ORDER — CLOPIDOGREL BISULFATE 75 MG/1
75 TABLET ORAL DAILY
Qty: 90 TABLET | Refills: 3 | Status: SHIPPED | OUTPATIENT
Start: 2024-06-24

## 2024-06-24 RX ORDER — LOSARTAN POTASSIUM 25 MG/1
25 TABLET ORAL DAILY
Qty: 90 TABLET | Refills: 3 | Status: SHIPPED | OUTPATIENT
Start: 2024-06-24

## 2024-06-24 RX ORDER — ATORVASTATIN CALCIUM 80 MG/1
80 TABLET, FILM COATED ORAL EVERY EVENING
Qty: 90 TABLET | Refills: 3 | Status: SHIPPED | OUTPATIENT
Start: 2024-06-24

## 2024-06-24 NOTE — LETTER
2024      Rafael Colin  [address]  [place] [address]      Dear Colleague,    Thank you for referring your patient, Rafael Colin, to the [facility] [place]. Please see a copy of my visit note below.    NEUROLOGY CONSULTATION NOTE       [facility] [place]  [address] [place] [address]  Tel: [phone]  Fax: [phone]  [email or web address]     Rafael Colin,  1970, MRN [id number]  PCP: Jerad Knight  Date: 2024     ASSESSMENT & PLAN     Visit Diagnosis  Intracranial atherosclerosis  History of stroke     H/O right UZIEL and MCA infarction  54-year-old female with HTN, HLD, deafness in left ear was admitted to the hospital on 2024 for right UZIEL and MCA infarction.  She had extensive intracranial atherosclerotic disease on CTA.  Her LDL was 194 and a hemoglobin A1c of 5.8 suggesting prediabetes.  Echocardiogram was unremarkable.  I have recommended:    1.  Continue baby aspirin and Plavix 75 mg daily indefinitely due to significant intracranial atherosclerotic disease  2.  Continue Lipitor 80 mg daily with goal of LDL less than 70.  3.  Keep blood pressure 120-130/80 or 90.  Previously Dr. Knight had started her on losartan that she discontinued.  I have restarted her back on losartan 25 mg daily  4.  30-day event monitor  5.  Follow-up in 4 months when I plan on repeating lipid panel and make adjustment in medication if 30-day event monitor shows any cardiac arrhythmia  6.  I will defer management of prediabetes to her primary care physician  7.  Patient was told to lose weight, avoid carbohydrates and focus on Mediterranean diet  8.  Follow-up in 4 months    Thank you again for this referral, please feel free to contact me if you have any questions.    Hi Ramey MD  [facility], [place]     REASON FOR CONSULTATION Stroke        HISTORY OF PRESENT ILLNESS     We have been requested by Dr. Knight to evaluate Rafael  Bi who is a 54 year old  female for CVA    Patient is a 54-year-old female with history of HLD, HTN,   Deafness in left ear who was evaluated in the ER on 6/17/2024 for left sided weakness and was found to have strokes in the right UZIEL and MCA territory likely due to severe intracranial atherosclerotic disease. She was started on dual antiplatelet therapy, Lipitor and asked to follow-up with neurology.      Patient presented to the emergency room on 6/17/2024 for weakness to left arm and left leg and reported that her symptoms began in May with difficulty walking that progressed over several weeks but she delayed coming to the hospital.  CTA of the head and neck showed extensive intracranial atherosclerotic disease along with hypodensity in the UZIEL and MCA distribution.  MRI brain showed multifocal infarct of varying age in the right UZIEL and MCA territory with more infarct in the right frontal and parietal lobes.  Patchy enhancement suggested subacute infarct.  Echocardiogram showed a normal ejection fraction with LVH with no right-to-left shunt and no significant valvular heart abnormality.  Her LDL was 194 hemoglobin A1c 5.8 and a normal troponin.  The patient carries a diagnosis of hypertension she is not taking any blood pressure medicine.     PROBLEM LIST   Patient Active Problem List   Diagnosis     Hyperlipidemia LDL goal <130     Neck Cervical Mass Left (___cm)     Allergic rhinitis     Vitamin D Deficiency     Obesity     Deafness in left ear     Lumbar back pain     Left-sided weakness     Multiple cerebral infarctions (H)     Intracranial atherosclerosis     H/O Rt UZIEL & MCA stroke     Prediabetes         PAST MEDICAL & SURGICAL HISTORY     Past Medical History:   Patient  has no past medical history on file.    Surgical History:  She  has no past surgical history on file.     SOCIAL HISTORY     Reviewed, and she  reports that she has never smoked. She has never been exposed to tobacco smoke. She has  "never used smokeless tobacco. She reports that she does not drink alcohol and does not use drugs.     FAMILY HISTORY     Reviewed, and family history is not on file.     ALLERGIES     Allergies   Allergen Reactions     Shellfish-Derived Products Anaphylaxis     Amoxicillin Rash     Contrave [Naltrexone-Bupropion Hcl Er] Unknown     Sulfamethoxazole-Trimethoprim [Sulfamethoxazole-Trimethoprim] Itching and Rash         REVIEW OF SYSTEMS     A 12 point review of system was performed and was negative except as outlined in the history of present illness.     HOME MEDICATIONS     Current Outpatient Rx   Medication Sig Dispense Refill     aspirin (ASA) 81 MG chewable tablet Take 1 tablet (81 mg) by mouth daily 90 tablet 3     atorvastatin (LIPITOR) 80 MG tablet Take 1 tablet (80 mg) by mouth every evening 90 tablet 3     clopidogrel (PLAVIX) 75 MG tablet Take 1 tablet (75 mg) by mouth daily 90 tablet 3     losartan (COZAAR) 25 MG tablet Take 1 tablet (25 mg) by mouth daily 90 tablet 3         PHYSICAL EXAM     Vital signs  BP (!) 150/93 (BP Location: Left arm, Patient Position: Sitting)   Pulse 61   Ht 1.397 m (4' 7\")   Wt 62.1 kg (137 lb)   LMP  (LMP Unknown)   BMI 31.84 kg/m      Weight:   137 lbs 0 oz    Alert and oriented x 3 no acute distress.  Speech normal with no dysarthria or aphasia.  She has deafness in the left ear motor strength on the right 5/5 on the left 5 -/5.  Reflexes 1+ toes equivocal on the left downgoing on the right gait normal.     PERTINENT DIAGNOSTIC STUDIES     Following studies were reviewed:     CTA HEAD AND NECK 6/17/2024  HEAD CT:  1.  Small suspected infarct in the posterior right frontal lobe involving the precentral gyrus.  2.  No mass effect or hemorrhage.  3.  Chronic right frontal (UZIEL distribution) and right lentiform nucleus infarcts.     HEAD CTA:   1.  Extensive intracranial atherosclerosis with severe stenosis of the communicating segment of the right ICA and tandem severe " stenosis at the carotid terminus, likely accounting for the above described infarct.  2.  Right A3 occlusion corresponding to an area of chronic infarct.  3.  Additional multifocal severe stenoses and occlusions detailed above.     NECK CTA:  1.  No large vessel occlusion or hemodynamically significant stenosis.    MRI BRAIN 6/17/2024  1.  Multifocal infarcts of varying ages in the right UZIEL and MCA territories with more acute infarcts in the right frontal and parietal lobes. Patchy areas of enhancement likely reflect subacute infarcts, however, continued attention on follow-up is   recommended to document expected resolution.  2.  No acute intracranial hemorrhage.    ECHOCARDIOGRAM 6/18/2024  1.Left ventricular size, wall motion and function are normal. The ejection  fraction is 60-65%.  2.There is mild concentric left ventricular hypertrophy.  3.Normal right ventricle size and systolic function.  4.A contrast injection (Bubble Study) was performed that was negative for flow  across the interatrial septum.  5.No hemodynamically significant valvular abnormalities on 2D or color flow  imaging.  6.Mild Ascending Aorta dilatation is present, 40 mm.  There is no comparison study available.     PERTINENT LABS  Following labs were reviewed:  Admission on 06/17/2024, Discharged on 06/19/2024   Component Date Value Ref Range Status     Sodium 06/17/2024 140  135 - 145 mmol/L Final     Potassium 06/17/2024 4.3  3.4 - 5.3 mmol/L Final     Carbon Dioxide (CO2) 06/17/2024 24  22 - 29 mmol/L Final     Anion Gap 06/17/2024 11  7 - 15 mmol/L Final     Urea Nitrogen 06/17/2024 11.5  6.0 - 20.0 mg/dL Final     Creatinine 06/17/2024 0.60  0.51 - 0.95 mg/dL Final     GFR Estimate 06/17/2024 >90  >60 mL/min/1.73m2 Final     Calcium 06/17/2024 9.6  8.6 - 10.0 mg/dL Final     Chloride 06/17/2024 105  98 - 107 mmol/L Final     Glucose 06/17/2024 92  70 - 99 mg/dL Final     Alkaline Phosphatase 06/17/2024 90  40 - 150 U/L Final     AST  06/17/2024 23  0 - 45 U/L Final     ALT 06/17/2024 26  0 - 50 U/L Final     Protein Total 06/17/2024 8.6 (H)  6.4 - 8.3 g/dL Final     Albumin 06/17/2024 4.4  3.5 - 5.2 g/dL Final     Bilirubin Total 06/17/2024 0.4  <=1.2 mg/dL Final     CK 06/17/2024 106  26 - 192 U/L Final     WBC Count 06/17/2024 5.0  4.0 - 11.0 10e3/uL Final     RBC Count 06/17/2024 5.09  3.80 - 5.20 10e6/uL Final     Hemoglobin 06/17/2024 14.8  11.7 - 15.7 g/dL Final     Hematocrit 06/17/2024 45.1  35.0 - 47.0 % Final     MCV 06/17/2024 89  78 - 100 fL Final     MCH 06/17/2024 29.1  26.5 - 33.0 pg Final     MCHC 06/17/2024 32.8  31.5 - 36.5 g/dL Final     RDW 06/17/2024 12.4  10.0 - 15.0 % Final     Platelet Count 06/17/2024 266  150 - 450 10e3/uL Final     % Neutrophils 06/17/2024 61  % Final     % Lymphocytes 06/17/2024 28  % Final     % Monocytes 06/17/2024 8  % Final     % Eosinophils 06/17/2024 3  % Final     % Basophils 06/17/2024 1  % Final     % Immature Granulocytes 06/17/2024 0  % Final     NRBCs per 100 WBC 06/17/2024 0  <1 /100 Final     Absolute Neutrophils 06/17/2024 3.1  1.6 - 8.3 10e3/uL Final     Absolute Lymphocytes 06/17/2024 1.4  0.8 - 5.3 10e3/uL Final     Absolute Monocytes 06/17/2024 0.4  0.0 - 1.3 10e3/uL Final     Absolute Eosinophils 06/17/2024 0.1  0.0 - 0.7 10e3/uL Final     Absolute Basophils 06/17/2024 0.0  0.0 - 0.2 10e3/uL Final     Absolute Immature Granulocytes 06/17/2024 0.0  <=0.4 10e3/uL Final     Absolute NRBCs 06/17/2024 0.0  10e3/uL Final     Ventricular Rate 06/18/2024 58  BPM Final     Atrial Rate 06/18/2024 58  BPM Final     WV Interval 06/18/2024 168  ms Final     QRS Duration 06/18/2024 98  ms Final     QT 06/18/2024 458  ms Final     QTc 06/18/2024 449  ms Final     P Axis 06/18/2024 50  degrees Final     R AXIS 06/18/2024 4  degrees Final     T Axis 06/18/2024 28  degrees Final     Interpretation ECG 06/18/2024    Final                    Value:Sinus bradycardia  Otherwise normal ECG  When compared  with ECG of 13-OCT-2017 12:17,  No significant change was found  Confirmed by SEE ED PROVIDER NOTE FOR, ECG INTERPRETATION (4000),  ASIA RODRIGUEZ ([id number]) on 6/18/2024 1:49:24 PM       Troponin T, High Sensitivity 06/17/2024 <6  <=14 ng/L Final     Hemoglobin A1C 06/17/2024 5.8 (H)  <5.7 % Final     Cholesterol 06/18/2024 263 (H)  <200 mg/dL Final     Triglycerides 06/18/2024 109  <150 mg/dL Final     Direct Measure HDL 06/18/2024 47 (L)  >=50 mg/dL Final     LDL Cholesterol Calculated 06/18/2024 194 (H)  <=100 mg/dL Final     Non HDL Cholesterol 06/18/2024 216 (H)  <130 mg/dL Final     LVEF  06/18/2024 60-65%   Final     GLUCOSE BY METER POCT 06/18/2024 88  70 - 99 mg/dL Final     Hold Specimen 06/18/2024 JIC   Final     GLUCOSE BY METER POCT 06/18/2024 95  70 - 99 mg/dL Final     GLUCOSE BY METER POCT 06/18/2024 82  70 - 99 mg/dL Final     GLUCOSE BY METER POCT 06/18/2024 102 (H)  70 - 99 mg/dL Final     GLUCOSE BY METER POCT 06/18/2024 121 (H)  70 - 99 mg/dL Final     WBC Count 06/19/2024 4.2  4.0 - 11.0 10e3/uL Final     RBC Count 06/19/2024 5.18  3.80 - 5.20 10e6/uL Final     Hemoglobin 06/19/2024 15.2  11.7 - 15.7 g/dL Final     Hematocrit 06/19/2024 45.3  35.0 - 47.0 % Final     MCV 06/19/2024 88  78 - 100 fL Final     MCH 06/19/2024 29.3  26.5 - 33.0 pg Final     MCHC 06/19/2024 33.6  31.5 - 36.5 g/dL Final     RDW 06/19/2024 12.1  10.0 - 15.0 % Final     Platelet Count 06/19/2024 298  150 - 450 10e3/uL Final     Sodium 06/19/2024 139  135 - 145 mmol/L Final     Potassium 06/19/2024 4.3  3.4 - 5.3 mmol/L Final     Chloride 06/19/2024 103  98 - 107 mmol/L Final     Carbon Dioxide (CO2) 06/19/2024 22  22 - 29 mmol/L Final     Anion Gap 06/19/2024 14  7 - 15 mmol/L Final     Urea Nitrogen 06/19/2024 15.4  6.0 - 20.0 mg/dL Final     Creatinine 06/19/2024 0.63  0.51 - 0.95 mg/dL Final     GFR Estimate 06/19/2024 >90  >60 mL/min/1.73m2 Final     Calcium 06/19/2024 9.8  8.6 - 10.0 mg/dL Final     Glucose  06/19/2024 116 (H)  70 - 99 mg/dL Final     GLUCOSE BY METER POCT 06/19/2024 107 (H)  70 - 99 mg/dL Final        Total time spent for face to face visit, reviewing labs/imaging studies, counseling and coordination of care was: 1 Hour spent on the date of the encounter doing chart review, review of outside records, review of test results, interpretation of tests, patient visit, and documentation     This note was dictated using voice recognition software.  Any grammatical or context distortions are unintentional and inherent to the software.    Orders Placed This Encounter   Procedures     Cardiac Event Monitor Adult/Pediatric      New Prescriptions    LOSARTAN (COZAAR) 25 MG TABLET    Take 1 tablet (25 mg) by mouth daily      Modified Medications    Modified Medication Previous Medication    ASPIRIN (ASA) 81 MG CHEWABLE TABLET aspirin (ASA) 81 MG chewable tablet       Take 1 tablet (81 mg) by mouth daily    Take 1 tablet (81 mg) by mouth daily    ATORVASTATIN (LIPITOR) 80 MG TABLET atorvastatin (LIPITOR) 80 MG tablet       Take 1 tablet (80 mg) by mouth every evening    Take 1 tablet (80 mg) by mouth every evening    CLOPIDOGREL (PLAVIX) 75 MG TABLET clopidogrel (PLAVIX) 75 MG tablet       Take 1 tablet (75 mg) by mouth daily    Take 1 tablet (75 mg) by mouth daily for 92 days                Again, thank you for allowing me to participate in the care of your patient.        Sincerely,        Hi Ramey MD

## 2024-06-24 NOTE — PROGRESS NOTES
NEUROLOGY CONSULTATION NOTE       [facility] NEUROLOGY [place]  [address] [place] [address]  Tel: [phone]  Fax: [phone]  [email or web address]     Rafael Colin,  1970, MRN [id number]  PCP: Jerad Knight  Date: 2024     ASSESSMENT & PLAN     Visit Diagnosis  Intracranial atherosclerosis  History of stroke     H/O right UZIEL and MCA infarction  54-year-old female with HTN, HLD, deafness in left ear was admitted to the hospital on 2024 for right UZIEL and MCA infarction.  She had extensive intracranial atherosclerotic disease on CTA.  Her LDL was 194 and a hemoglobin A1c of 5.8 suggesting prediabetes.  Echocardiogram was unremarkable.  I have recommended:    1.  Continue baby aspirin and Plavix 75 mg daily indefinitely due to significant intracranial atherosclerotic disease  2.  Continue Lipitor 80 mg daily with goal of LDL less than 70.  3.  Keep blood pressure 120-130/80 or 90.  Previously Dr. Knight had started her on losartan that she discontinued.  I have restarted her back on losartan 25 mg daily  4.  30-day event monitor  5.  Follow-up in 4 months when I plan on repeating lipid panel and make adjustment in medication if 30-day event monitor shows any cardiac arrhythmia  6.  I will defer management of prediabetes to her primary care physician  7.  Patient was told to lose weight, avoid carbohydrates and focus on Mediterranean diet  8.  Follow-up in 4 months    Thank you again for this referral, please feel free to contact me if you have any questions.    Hi Ramey MD  [facility]     REASON FOR CONSULTATION Stroke        HISTORY OF PRESENT ILLNESS     We have been requested by Dr. Knight to evaluate Rafael Colin who is a 54 year old  female for CVA    Patient is a 54-year-old female with history of HLD, HTN,   Deafness in left ear who was evaluated in the ER on 2024 for left sided weakness and was found to have strokes in the right UZIEL and  MCA territory likely due to severe intracranial atherosclerotic disease. She was started on dual antiplatelet therapy, Lipitor and asked to follow-up with neurology.      Patient presented to the emergency room on 6/17/2024 for weakness to left arm and left leg and reported that her symptoms began in May with difficulty walking that progressed over several weeks but she delayed coming to the hospital.  CTA of the head and neck showed extensive intracranial atherosclerotic disease along with hypodensity in the UZIEL and MCA distribution.  MRI brain showed multifocal infarct of varying age in the right UZIEL and MCA territory with more infarct in the right frontal and parietal lobes.  Patchy enhancement suggested subacute infarct.  Echocardiogram showed a normal ejection fraction with LVH with no right-to-left shunt and no significant valvular heart abnormality.  Her LDL was 194 hemoglobin A1c 5.8 and a normal troponin.  The patient carries a diagnosis of hypertension she is not taking any blood pressure medicine.     PROBLEM LIST   Patient Active Problem List   Diagnosis    Hyperlipidemia LDL goal <130    Neck Cervical Mass Left (___cm)    Allergic rhinitis    Vitamin D Deficiency    Obesity    Deafness in left ear    Lumbar back pain    Left-sided weakness    Multiple cerebral infarctions (H)    Intracranial atherosclerosis    H/O Rt UZIEL & MCA stroke    Prediabetes         PAST MEDICAL & SURGICAL HISTORY     Past Medical History:   Patient  has no past medical history on file.    Surgical History:  She  has no past surgical history on file.     SOCIAL HISTORY     Reviewed, and she  reports that she has never smoked. She has never been exposed to tobacco smoke. She has never used smokeless tobacco. She reports that she does not drink alcohol and does not use drugs.     FAMILY HISTORY     Reviewed, and family history is not on file.     ALLERGIES     Allergies   Allergen Reactions    Shellfish-Derived Products Anaphylaxis  "   Amoxicillin Rash    Contrave [Naltrexone-Bupropion Hcl Er] Unknown    Sulfamethoxazole-Trimethoprim [Sulfamethoxazole-Trimethoprim] Itching and Rash         REVIEW OF SYSTEMS     A 12 point review of system was performed and was negative except as outlined in the history of present illness.     HOME MEDICATIONS     Current Outpatient Rx   Medication Sig Dispense Refill    aspirin (ASA) 81 MG chewable tablet Take 1 tablet (81 mg) by mouth daily 90 tablet 3    atorvastatin (LIPITOR) 80 MG tablet Take 1 tablet (80 mg) by mouth every evening 90 tablet 3    clopidogrel (PLAVIX) 75 MG tablet Take 1 tablet (75 mg) by mouth daily 90 tablet 3    losartan (COZAAR) 25 MG tablet Take 1 tablet (25 mg) by mouth daily 90 tablet 3         PHYSICAL EXAM     Vital signs  BP (!) 150/93 (BP Location: Left arm, Patient Position: Sitting)   Pulse 61   Ht 1.397 m (4' 7\")   Wt 62.1 kg (137 lb)   LMP  (LMP Unknown)   BMI 31.84 kg/m      Weight:   137 lbs 0 oz    Alert and oriented x 3 no acute distress.  Speech normal with no dysarthria or aphasia.  She has deafness in the left ear motor strength on the right 5/5 on the left 5 -/5.  Reflexes 1+ toes equivocal on the left downgoing on the right gait normal.     PERTINENT DIAGNOSTIC STUDIES     Following studies were reviewed:     CTA HEAD AND NECK 6/17/2024  HEAD CT:  1.  Small suspected infarct in the posterior right frontal lobe involving the precentral gyrus.  2.  No mass effect or hemorrhage.  3.  Chronic right frontal (UZIEL distribution) and right lentiform nucleus infarcts.     HEAD CTA:   1.  Extensive intracranial atherosclerosis with severe stenosis of the communicating segment of the right ICA and tandem severe stenosis at the carotid terminus, likely accounting for the above described infarct.  2.  Right A3 occlusion corresponding to an area of chronic infarct.  3.  Additional multifocal severe stenoses and occlusions detailed above.     NECK CTA:  1.  No large vessel " occlusion or hemodynamically significant stenosis.    MRI BRAIN 6/17/2024  1.  Multifocal infarcts of varying ages in the right UZIEL and MCA territories with more acute infarcts in the right frontal and parietal lobes. Patchy areas of enhancement likely reflect subacute infarcts, however, continued attention on follow-up is   recommended to document expected resolution.  2.  No acute intracranial hemorrhage.    ECHOCARDIOGRAM 6/18/2024  1.Left ventricular size, wall motion and function are normal. The ejection  fraction is 60-65%.  2.There is mild concentric left ventricular hypertrophy.  3.Normal right ventricle size and systolic function.  4.A contrast injection (Bubble Study) was performed that was negative for flow  across the interatrial septum.  5.No hemodynamically significant valvular abnormalities on 2D or color flow  imaging.  6.Mild Ascending Aorta dilatation is present, 40 mm.  There is no comparison study available.     PERTINENT LABS  Following labs were reviewed:  Admission on 06/17/2024, Discharged on 06/19/2024   Component Date Value Ref Range Status    Sodium 06/17/2024 140  135 - 145 mmol/L Final    Potassium 06/17/2024 4.3  3.4 - 5.3 mmol/L Final    Carbon Dioxide (CO2) 06/17/2024 24  22 - 29 mmol/L Final    Anion Gap 06/17/2024 11  7 - 15 mmol/L Final    Urea Nitrogen 06/17/2024 11.5  6.0 - 20.0 mg/dL Final    Creatinine 06/17/2024 0.60  0.51 - 0.95 mg/dL Final    GFR Estimate 06/17/2024 >90  >60 mL/min/1.73m2 Final    Calcium 06/17/2024 9.6  8.6 - 10.0 mg/dL Final    Chloride 06/17/2024 105  98 - 107 mmol/L Final    Glucose 06/17/2024 92  70 - 99 mg/dL Final    Alkaline Phosphatase 06/17/2024 90  40 - 150 U/L Final    AST 06/17/2024 23  0 - 45 U/L Final    ALT 06/17/2024 26  0 - 50 U/L Final    Protein Total 06/17/2024 8.6 (H)  6.4 - 8.3 g/dL Final    Albumin 06/17/2024 4.4  3.5 - 5.2 g/dL Final    Bilirubin Total 06/17/2024 0.4  <=1.2 mg/dL Final    CK 06/17/2024 106  26 - 192 U/L Final    WBC  Count 06/17/2024 5.0  4.0 - 11.0 10e3/uL Final    RBC Count 06/17/2024 5.09  3.80 - 5.20 10e6/uL Final    Hemoglobin 06/17/2024 14.8  11.7 - 15.7 g/dL Final    Hematocrit 06/17/2024 45.1  35.0 - 47.0 % Final    MCV 06/17/2024 89  78 - 100 fL Final    MCH 06/17/2024 29.1  26.5 - 33.0 pg Final    MCHC 06/17/2024 32.8  31.5 - 36.5 g/dL Final    RDW 06/17/2024 12.4  10.0 - 15.0 % Final    Platelet Count 06/17/2024 266  150 - 450 10e3/uL Final    % Neutrophils 06/17/2024 61  % Final    % Lymphocytes 06/17/2024 28  % Final    % Monocytes 06/17/2024 8  % Final    % Eosinophils 06/17/2024 3  % Final    % Basophils 06/17/2024 1  % Final    % Immature Granulocytes 06/17/2024 0  % Final    NRBCs per 100 WBC 06/17/2024 0  <1 /100 Final    Absolute Neutrophils 06/17/2024 3.1  1.6 - 8.3 10e3/uL Final    Absolute Lymphocytes 06/17/2024 1.4  0.8 - 5.3 10e3/uL Final    Absolute Monocytes 06/17/2024 0.4  0.0 - 1.3 10e3/uL Final    Absolute Eosinophils 06/17/2024 0.1  0.0 - 0.7 10e3/uL Final    Absolute Basophils 06/17/2024 0.0  0.0 - 0.2 10e3/uL Final    Absolute Immature Granulocytes 06/17/2024 0.0  <=0.4 10e3/uL Final    Absolute NRBCs 06/17/2024 0.0  10e3/uL Final    Ventricular Rate 06/18/2024 58  BPM Final    Atrial Rate 06/18/2024 58  BPM Final    DC Interval 06/18/2024 168  ms Final    QRS Duration 06/18/2024 98  ms Final    QT 06/18/2024 458  ms Final    QTc 06/18/2024 449  ms Final    P Axis 06/18/2024 50  degrees Final    R AXIS 06/18/2024 4  degrees Final    T Axis 06/18/2024 28  degrees Final    Interpretation ECG 06/18/2024    Final                    Value:Sinus bradycardia  Otherwise normal ECG  When compared with ECG of 13-OCT-2017 12:17,  No significant change was found  Confirmed by SEE ED PROVIDER NOTE FOR, ECG INTERPRETATION ([id number]),  ASIA RODRIGUEZ ([id number]) on 6/18/2024 1:49:24 PM      Troponin T, High Sensitivity 06/17/2024 <6  <=14 ng/L Final    Hemoglobin A1C 06/17/2024 5.8 (H)  <5.7 % Final     Cholesterol 06/18/2024 263 (H)  <200 mg/dL Final    Triglycerides 06/18/2024 109  <150 mg/dL Final    Direct Measure HDL 06/18/2024 47 (L)  >=50 mg/dL Final    LDL Cholesterol Calculated 06/18/2024 194 (H)  <=100 mg/dL Final    Non HDL Cholesterol 06/18/2024 216 (H)  <130 mg/dL Final    LVEF  06/18/2024 60-65%   Final    GLUCOSE BY METER POCT 06/18/2024 88  70 - 99 mg/dL Final    Hold Specimen 06/18/2024 JIC   Final    GLUCOSE BY METER POCT 06/18/2024 95  70 - 99 mg/dL Final    GLUCOSE BY METER POCT 06/18/2024 82  70 - 99 mg/dL Final    GLUCOSE BY METER POCT 06/18/2024 102 (H)  70 - 99 mg/dL Final    GLUCOSE BY METER POCT 06/18/2024 121 (H)  70 - 99 mg/dL Final    WBC Count 06/19/2024 4.2  4.0 - 11.0 10e3/uL Final    RBC Count 06/19/2024 5.18  3.80 - 5.20 10e6/uL Final    Hemoglobin 06/19/2024 15.2  11.7 - 15.7 g/dL Final    Hematocrit 06/19/2024 45.3  35.0 - 47.0 % Final    MCV 06/19/2024 88  78 - 100 fL Final    MCH 06/19/2024 29.3  26.5 - 33.0 pg Final    MCHC 06/19/2024 33.6  31.5 - 36.5 g/dL Final    RDW 06/19/2024 12.1  10.0 - 15.0 % Final    Platelet Count 06/19/2024 298  150 - 450 10e3/uL Final    Sodium 06/19/2024 139  135 - 145 mmol/L Final    Potassium 06/19/2024 4.3  3.4 - 5.3 mmol/L Final    Chloride 06/19/2024 103  98 - 107 mmol/L Final    Carbon Dioxide (CO2) 06/19/2024 22  22 - 29 mmol/L Final    Anion Gap 06/19/2024 14  7 - 15 mmol/L Final    Urea Nitrogen 06/19/2024 15.4  6.0 - 20.0 mg/dL Final    Creatinine 06/19/2024 0.63  0.51 - 0.95 mg/dL Final    GFR Estimate 06/19/2024 >90  >60 mL/min/1.73m2 Final    Calcium 06/19/2024 9.8  8.6 - 10.0 mg/dL Final    Glucose 06/19/2024 116 (H)  70 - 99 mg/dL Final    GLUCOSE BY METER POCT 06/19/2024 107 (H)  70 - 99 mg/dL Final        Total time spent for face to face visit, reviewing labs/imaging studies, counseling and coordination of care was: 1 Hour spent on the date of the encounter doing chart review, review of outside records, review of test results,  interpretation of tests, patient visit, and documentation     This note was dictated using voice recognition software.  Any grammatical or context distortions are unintentional and inherent to the software.    Orders Placed This Encounter   Procedures    Cardiac Event Monitor Adult/Pediatric      New Prescriptions    LOSARTAN (COZAAR) 25 MG TABLET    Take 1 tablet (25 mg) by mouth daily      Modified Medications    Modified Medication Previous Medication    ASPIRIN (ASA) 81 MG CHEWABLE TABLET aspirin (ASA) 81 MG chewable tablet       Take 1 tablet (81 mg) by mouth daily    Take 1 tablet (81 mg) by mouth daily    ATORVASTATIN (LIPITOR) 80 MG TABLET atorvastatin (LIPITOR) 80 MG tablet       Take 1 tablet (80 mg) by mouth every evening    Take 1 tablet (80 mg) by mouth every evening    CLOPIDOGREL (PLAVIX) 75 MG TABLET clopidogrel (PLAVIX) 75 MG tablet       Take 1 tablet (75 mg) by mouth daily    Take 1 tablet (75 mg) by mouth daily for 92 days

## 2024-06-24 NOTE — NURSING NOTE
Chief Complaint   Patient presents with    Stroke     Hospital follow up- patient reports she did not   the 3 medications she was discharged with (ASA, Lipitor, Plavix)      Pearl Cox CMA on 6/24/2024 at 2:17 PM  [facility]

## 2024-06-24 NOTE — TELEPHONE ENCOUNTER
I called [facility], they required patient to be on the phone, I made conference call and spoke with patient. Per patient, she's undecided at this time because she'd like to go back to work and will ask her neurologist at the follow up appointment today. I inform patient to give us a call back what her plans are and if she needs help.

## 2024-07-02 ENCOUNTER — APPOINTMENT (OUTPATIENT)
Dept: INTERPRETER SERVICES | Facility: CLINIC | Age: 54
End: 2024-07-02
Payer: COMMERCIAL

## 2024-07-05 ENCOUNTER — OFFICE VISIT (OUTPATIENT)
Dept: FAMILY MEDICINE | Facility: CLINIC | Age: 54
End: 2024-07-05
Payer: COMMERCIAL

## 2024-07-05 VITALS
SYSTOLIC BLOOD PRESSURE: 120 MMHG | HEIGHT: 55 IN | OXYGEN SATURATION: 97 % | HEART RATE: 62 BPM | DIASTOLIC BLOOD PRESSURE: 76 MMHG | BODY MASS INDEX: 32.22 KG/M2 | WEIGHT: 139.25 LBS | RESPIRATION RATE: 16 BRPM | TEMPERATURE: 98.5 F

## 2024-07-05 DIAGNOSIS — R53.1 LEFT-SIDED WEAKNESS: ICD-10-CM

## 2024-07-05 DIAGNOSIS — Z86.73 HISTORY OF STROKE: Primary | ICD-10-CM

## 2024-07-05 DIAGNOSIS — N39.3 FEMALE STRESS INCONTINENCE: ICD-10-CM

## 2024-07-05 PROCEDURE — 99213 OFFICE O/P EST LOW 20 MIN: CPT | Performed by: FAMILY MEDICINE

## 2024-07-05 NOTE — PROGRESS NOTES
"  Assessment & Plan     History of stroke    In June.  Multifocal.    Follows with neurology.      Female stress incontinence    - Incontinence Supplies Order for DME - ONLY FOR DME    Left-sided weakness    Due to stroke.    She would like assistance applying for PCA.        She has appt with Dr. Roman 8/22/24.      - Primary Care - Care Coordination Referral    Review of the result(s) of each unique test - MR head, A1c, CBC        MED REC REQUIRED  Post Medication Reconciliation Status: discharge medications reconciled, continue medications without change  BMI  Estimated body mass index is 32.36 kg/m  as calculated from the following:    Height as of this encounter: 1.397 m (4' 7\").    Weight as of this encounter: 63.2 kg (139 lb 4 oz).             Subjective   Rafael is a 54 year old, presenting for the following health issues:  Hospital F/U (6/17-6/19)      7/5/2024     9:46 AM   Additional Questions   Roomed by JIMENEZ Gonzalez   Accompanied by Self     HPI           She was hospitalized 6/17/24 - 6/19/24 due to stroke.  Right frontal and parietal.    Hard to shower.    A1c 5.8    Incontinent if she coughs.    Uses 3 diapers per day.    Neuro appt 6/24/24: clopidogrel indefinitely.    Current Outpatient Medications   Medication Sig Dispense Refill    aspirin (ASA) 81 MG chewable tablet Take 1 tablet (81 mg) by mouth daily 90 tablet 3    atorvastatin (LIPITOR) 80 MG tablet Take 1 tablet (80 mg) by mouth every evening 90 tablet 3    clopidogrel (PLAVIX) 75 MG tablet Take 1 tablet (75 mg) by mouth daily 90 tablet 3    losartan (COZAAR) 25 MG tablet Take 1 tablet (25 mg) by mouth daily 90 tablet 3           Objective    /76 (BP Location: Right arm, Patient Position: Sitting, Cuff Size: Adult Regular)   Pulse 62   Temp 98.5  F (36.9  C) (Oral)   Resp 16   Ht 1.397 m (4' 7\")   Wt 63.2 kg (139 lb 4 oz)   LMP  (LMP Unknown)   SpO2 97%   BMI 32.36 kg/m    Body mass index is 32.36 kg/m .  Physical Exam "     Heart normal  Lungs normal  Wearing left AFO          Signed Electronically by: Abhi Oneil MD

## 2024-07-12 ENCOUNTER — OFFICE VISIT (OUTPATIENT)
Dept: FAMILY MEDICINE | Facility: CLINIC | Age: 54
End: 2024-07-12
Payer: COMMERCIAL

## 2024-07-12 ENCOUNTER — NURSE TRIAGE (OUTPATIENT)
Dept: FAMILY MEDICINE | Facility: CLINIC | Age: 54
End: 2024-07-12

## 2024-07-12 ENCOUNTER — ALLIED HEALTH/NURSE VISIT (OUTPATIENT)
Dept: FAMILY MEDICINE | Facility: CLINIC | Age: 54
End: 2024-07-12
Payer: COMMERCIAL

## 2024-07-12 VITALS
OXYGEN SATURATION: 99 % | SYSTOLIC BLOOD PRESSURE: 116 MMHG | HEART RATE: 68 BPM | TEMPERATURE: 99.9 F | RESPIRATION RATE: 20 BRPM | DIASTOLIC BLOOD PRESSURE: 73 MMHG

## 2024-07-12 VITALS
SYSTOLIC BLOOD PRESSURE: 122 MMHG | HEIGHT: 56 IN | BODY MASS INDEX: 30.48 KG/M2 | RESPIRATION RATE: 18 BRPM | OXYGEN SATURATION: 99 % | WEIGHT: 135.5 LBS | TEMPERATURE: 98.4 F | HEART RATE: 64 BPM | DIASTOLIC BLOOD PRESSURE: 77 MMHG

## 2024-07-12 DIAGNOSIS — J06.9 VIRAL URI: Primary | ICD-10-CM

## 2024-07-12 DIAGNOSIS — Z78.9 TRIAGE ASSESSMENT CLASS 3, NONURGENT: Primary | ICD-10-CM

## 2024-07-12 LAB — SARS-COV-2 RNA RESP QL NAA+PROBE: NEGATIVE

## 2024-07-12 PROCEDURE — 99213 OFFICE O/P EST LOW 20 MIN: CPT | Performed by: FAMILY MEDICINE

## 2024-07-12 PROCEDURE — 99207 PR NO CHARGE NURSE ONLY: CPT

## 2024-07-12 PROCEDURE — 87635 SARS-COV-2 COVID-19 AMP PRB: CPT | Performed by: FAMILY MEDICINE

## 2024-07-12 RX ORDER — BENZONATATE 200 MG/1
200 CAPSULE ORAL 3 TIMES DAILY PRN
Qty: 21 CAPSULE | Refills: 0 | Status: SHIPPED | OUTPATIENT
Start: 2024-07-12 | End: 2024-08-05

## 2024-07-12 NOTE — PROGRESS NOTES
OUTPATIENT VISIT NOTE                                                   Date of Visit: 7/12/2024     Chief Complaint   Patient presents with:  Cough: Chest pain from cough/fever/chills/headache X            History of Present Illness   Rafael Colin is a 54 year old female with phone  has been sick for 2 days.  Has felt hot . Had chills  Left sided ear pain.  Mild sore throat  Cough, slight.  Runny nose.  Mild chest pain with coughing.  Had stroke in June 2024.       MEDICATIONS   Current Outpatient Medications   Medication Sig Dispense Refill    aspirin (ASA) 81 MG chewable tablet Take 1 tablet (81 mg) by mouth daily 90 tablet 3    atorvastatin (LIPITOR) 80 MG tablet Take 1 tablet (80 mg) by mouth every evening 90 tablet 3    benzonatate (TESSALON) 200 MG capsule Take 1 capsule (200 mg) by mouth 3 times daily as needed for cough 21 capsule 0    clopidogrel (PLAVIX) 75 MG tablet Take 1 tablet (75 mg) by mouth daily 90 tablet 3    losartan (COZAAR) 25 MG tablet Take 1 tablet (25 mg) by mouth daily 90 tablet 3     No current facility-administered medications for this visit.         SOCIAL HISTORY   Social History     Tobacco Use    Smoking status: Never     Passive exposure: Never    Smokeless tobacco: Never   Substance Use Topics    Alcohol use: No           Physical Exam   Vitals:    07/12/24 1550   BP: 116/73   Pulse: 68   Resp: 20   Temp: 99.9  F (37.7  C)   TempSrc: Tympanic   SpO2: 99%      GENERAL:   Alert. Oriented.  EYES: Clear  HENT:  Ears: R TM pearly gray. Normal landmarks. L TM pearly gray.  Normal landmarks  Nose: Clear.  Sinuses: Nontender.  Oropharynx:  No erythema. No exudate.  NECK: Supple. No adenopathy.  LUNGS: Clear to ascultation.  No crackles.  No wheezing  HEART: RRR  SKIN:  No rash.            Assessment and Plan     Viral URI  Apears well.  Clear lung exam.  Covid test.--If positive, should have pxlovid.  Benzonatat  Recheck as needed  - Symptomatic COVID-19 Virus (Coronavirus) by PCR  -  benzonatate (TESSALON) 200 MG capsule  Dispense: 21 capsule; Refill: 0                 Discussed signs / symptoms that warrant urgent / emergent medical attention.   Recheck if worsening or not improving.       Erik Beckwith MD          Pertinent History     The following portions of the patient's history were reviewed and updated as appropriate: allergies, current medications, past family history, past medical history, past social history, past surgical history and problem list.

## 2024-07-12 NOTE — TELEPHONE ENCOUNTER
Nurse Triage SBAR    Is this a 2nd Level Triage? NO    Situation: Patient walked-in complains of having cold, chills, fever, and some chest pain     Background: Patient has been sick since yesterday, not feeling well. Patient said last fever was this morning at 5am, felt feverish, no temp taken. Patient states her main concern is feeling cold and chills.     Assessment:     B/P: 122/77, T: 98.4, P: 64, R: 18    Mild runny nose, minor intermittent cough, felt feverish around 5am, cold/chills. Headaches. Some chest pain (chronic per patient).     Protocol Recommended Disposition: See in Office Today or Tomorrow       Recommendation: RN recommends patient be see in office today or tomorrow. No availabilities today with any of our providers. Recommends patient be seen in person today, needs to go to Urgent Care. Patient agrees states she will find someone to take her.       Does the patient meet one of the following criteria for ADS visit consideration? 16+ years old, with an [facility] PCP     TIP  Providers, please consider if this condition is appropriate for management at one of our Acute and Diagnostic Services sites.     If patient is a good candidate, please use dotphrase <dot>triageresponse and select Refer to ADS to document.      Reason for Disposition   Patient wants to be seen    Additional Information   Negative: SEVERE difficulty breathing (e.g., struggling for each breath, speaks in single words)   Negative: Very weak (can't stand)   Negative: Sounds like a life-threatening emergency to the triager   Negative: Difficulty breathing and not from stuffy nose (e.g., not relieved by cleaning out the nose)   Negative: Runny nose is caused by pollen or other allergies   Negative: Cough is main symptom   Negative: Sore throat is main symptom   Negative: Patient sounds very sick or weak to the triager   Negative: Fever > 103 F (39.4 C)   Negative: Fever > 101 F (38.3 C) and over 60 years of age   Negative: Fever >  "100.0 F (37.8 C) and has diabetes mellitus or a weak immune system (e.g., HIV positive, cancer chemotherapy, organ transplant, splenectomy, chronic steroids)   Negative: Fever > 100.0 F (37.8 C) and bedridden (e.g., CVA, chronic illness, recovering from surgery)   Negative: Fever present > 3 days (72 hours)   Negative: Fever returns after gone for over 24 hours and symptoms worse or not improved   Negative: Sinus pain (not just congestion) and fever   Negative: Earache   Negative: Sinus congestion (pressure, fullness) present > 10 days   Negative: Nasal discharge present > 10 days   Negative: Using nasal washes and pain medicine > 24 hours and sinus pain (lower forehead, cheekbone, or eye) persists    Answer Assessment - Initial Assessment Questions  1. ONSET: \"When did the nasal discharge start?\"       Got sick, yesterday 7/11/24--fever, cold/chills, chest pain   2. AMOUNT: \"How much discharge is there?\"       Chronic runny nose--ongoing issues   3. COUGH: \"Do you have a cough?\" If Yes, ask: \"Describe the color of your sputum\" (clear, white, yellow, green)      Minor   4. RESPIRATORY DISTRESS: \"Describe your breathing.\"       No   5. FEVER: \"Do you have a fever?\" If Yes, ask: \"What is your temperature, how was it measured, and when did it start?\"      This morning, patient was feeling feverish around 5am   6. SEVERITY: \"Overall, how bad are you feeling right now?\" (e.g., doesn't interfere with normal activities, staying home from school/work, staying in bed)       Moderate severity---it affects daily activities   7. OTHER SYMPTOMS: \"Do you have any other symptoms?\" (e.g., sore throat, earache, wheezing, vomiting)      Cold/chills, chest pain, headaches  8. PREGNANCY: \"Is there any chance you are pregnant?\" \"When was your last menstrual period?\"      N/A    Protocols used: Common Cold-A-[place]      Vitaliy Geiger, MSN, RN   [facility]       "

## 2024-07-16 NOTE — PROGRESS NOTES
7/16/2024: New CC referral received placed by Dr Oneil for patient requesting PCA service.     Plan: [facility] will assist patient during upcoming outreach scheduled on 7/18/24. New goal created.

## 2024-07-18 NOTE — TELEPHONE ENCOUNTER
Barb is visiting patient today and she will be assisting patient with PCA referral, SSI and others.

## 2024-07-23 ENCOUNTER — PATIENT OUTREACH (OUTPATIENT)
Dept: CARE COORDINATION | Facility: CLINIC | Age: 54
End: 2024-07-23
Payer: COMMERCIAL

## 2024-07-23 NOTE — PROGRESS NOTES
Clinic Care Coordination Contact  Community Health Worker Follow Up    Care Gaps:   Health Maintenance Due   Topic Date Due    ADVANCE CARE PLANNING  Never done    COLORECTAL CANCER SCREENING  Never done    YEARLY PREVENTIVE VISIT  05/31/2020    ZOSTER IMMUNIZATION (1 of 2) Never done    COVID-19 Vaccine (4 - 2023-24 season) 09/01/2023    HPV TEST  05/31/2024    PAP  05/31/2024     Address on 7/05/2024.    Care Plan:   Care Plan: Establish care       Problem: est care       Goal: Patient would like to establish care with a new PCP in the next 90 days.       Start Date: 6/20/2024 Expected End Date: 9/30/2024    This Visit's Progress: 30% Recent Progress: 20%    Note:     Barriers: language barrier, low literacy, noncompliance, and lack of knowledge how to navigate complex health care system  Strengths: motivated to attend appt  Patient expressed understanding of goal: Yes    Action steps to achieve this goal:  1. I will answer my phone when I am contacted to schedule my appointment.  2. I will attend my establish care appointment as scheduled on 8/22/2024 at 1:40pm.   3. I will schedule a follow up appointment with my PCP if it is recommended to do so while I am at the clinic.  4. I will follow up with CCC regarding this goal at each outreach until it is completed.                               Care Plan: Neurology       Problem: CVA       Goal: Patient will attend her neurology appointment in the next 6 months.       Start Date: 6/20/2024 Expected End Date: 12/31/2024    This Visit's Progress: 20% Recent Progress: 10%    Note:     Barriers: language barrier, low literacy, noncompliance, and lack of knowledge how to navigate complex health care system  Strengths: motivated to attend appt  Patient expressed understanding of goal: Yes    Action steps to achieve this goal:  1. I will attend my neurology appointment as scheduled on 6/24/24 at 2:15pm. Completed.  2. I will attend my 4 months follow up appointment with  neurologist as scheduled on 10/10/2024 at 2:15 PM.  3. I will follow up with CCC regarding this goal at each outreach until it is completed.                             Care Plan: PCA service       Problem: imparied mobility       Goal: Patient would like to explore if she could qualify for PCA service in the next 12 months.       Start Date: 7/16/2024 Expected End Date: 7/31/2025    This Visit's Progress: 20% Recent Progress: 10%    Note:     Barriers: language  Strengths: Willing to accept CCC support,   Patient expressed understanding of goal: Yes    Action steps to achieve this goal:  1. My [facility] worker will help submit referral to [facility] for PCA services.   2.  I will make sure I have any supportive family/friends present for my assessment.   3.  I will follow up with CCC in the next month regarding this goal.     Note: Referral to [facility] was placed on TBD.  Note: Patient's [employer] worker from [place] will help patient with [facility] referral, [facility] and housing.                            Intervention and Education during outreach:  -Patient is rescheduled for event monitor at [facility] on 7/26/2024 at 10:30 AM and transportation arranged with [facility] [employer].  -Patient's [employer] worker from [place] will assist patient applying for PCA, SSI and housing.  -Patient was informed to call with questions or concerns.     CHW Next Outreach: In one month.

## 2024-07-26 ENCOUNTER — HOSPITAL ENCOUNTER (OUTPATIENT)
Dept: CARDIOLOGY | Facility: HOSPITAL | Age: 54
Discharge: HOME | End: 2024-07-26
Attending: PSYCHIATRY & NEUROLOGY | Admitting: PSYCHIATRY & NEUROLOGY
Payer: COMMERCIAL

## 2024-07-26 DIAGNOSIS — Z86.73 HISTORY OF STROKE: ICD-10-CM

## 2024-07-26 PROCEDURE — 93270 REMOTE 30 DAY ECG REV/REPORT: CPT

## 2024-07-31 ENCOUNTER — PATIENT OUTREACH (OUTPATIENT)
Dept: NURSING | Facility: CLINIC | Age: 54
End: 2024-07-31
Payer: COMMERCIAL

## 2024-07-31 NOTE — PROGRESS NOTES
Clinic Care Coordination Contact  Follow Up Progress Note      Assessment: CCRN spoke with patient today via phone. Reviewed neurology recommendations with patient. Patient states she's very forgetful. Unable to review meds with patient today stating that she's not sure what meds she's taking Agreed to come see CCRN in clinic on 8/5/24. Patient states her left leg is swelling the past 2-3 days and she would like to see a provider. Assisted patient scheduled to see a provider on 8/5/24. Instructed patient to bring all her prescription bottles to her appt on 8/5/24.     Plan: Patient will attend her appts with Dr Muñoz and PAM on 8/5/24.

## 2024-08-05 ENCOUNTER — ALLIED HEALTH/NURSE VISIT (OUTPATIENT)
Dept: NURSING | Facility: CLINIC | Age: 54
End: 2024-08-05
Payer: COMMERCIAL

## 2024-08-05 ENCOUNTER — ANCILLARY PROCEDURE (OUTPATIENT)
Dept: GENERAL RADIOLOGY | Facility: CLINIC | Age: 54
End: 2024-08-05
Attending: FAMILY MEDICINE
Payer: COMMERCIAL

## 2024-08-05 ENCOUNTER — OFFICE VISIT (OUTPATIENT)
Dept: FAMILY MEDICINE | Facility: CLINIC | Age: 54
End: 2024-08-05
Payer: COMMERCIAL

## 2024-08-05 VITALS
SYSTOLIC BLOOD PRESSURE: 116 MMHG | TEMPERATURE: 99 F | HEART RATE: 57 BPM | OXYGEN SATURATION: 99 % | RESPIRATION RATE: 16 BRPM | BODY MASS INDEX: 31.32 KG/M2 | HEIGHT: 56 IN | WEIGHT: 139.25 LBS | DIASTOLIC BLOOD PRESSURE: 71 MMHG

## 2024-08-05 DIAGNOSIS — R53.1 LEFT-SIDED WEAKNESS: ICD-10-CM

## 2024-08-05 DIAGNOSIS — M25.572 ACUTE LEFT ANKLE PAIN: ICD-10-CM

## 2024-08-05 DIAGNOSIS — Z71.89 COMPLEX CARE COORDINATION: Primary | ICD-10-CM

## 2024-08-05 DIAGNOSIS — M25.572 ACUTE LEFT ANKLE PAIN: Primary | ICD-10-CM

## 2024-08-05 DIAGNOSIS — Z86.73 HISTORY OF STROKE: ICD-10-CM

## 2024-08-05 LAB
ALBUMIN SERPL BCG-MCNC: 4.6 G/DL (ref 3.5–5.2)
ALP SERPL-CCNC: 80 U/L (ref 40–150)
ALT SERPL W P-5'-P-CCNC: 37 U/L (ref 0–50)
ANION GAP SERPL CALCULATED.3IONS-SCNC: 12 MMOL/L (ref 7–15)
AST SERPL W P-5'-P-CCNC: 34 U/L (ref 0–45)
BASOPHILS # BLD AUTO: 0 10E3/UL (ref 0–0.2)
BASOPHILS NFR BLD AUTO: 0 %
BILIRUB SERPL-MCNC: 0.3 MG/DL
BUN SERPL-MCNC: 6.9 MG/DL (ref 6–20)
CALCIUM SERPL-MCNC: 9.3 MG/DL (ref 8.8–10.4)
CHLORIDE SERPL-SCNC: 104 MMOL/L (ref 98–107)
CREAT SERPL-MCNC: 0.57 MG/DL (ref 0.51–0.95)
CRP SERPL-MCNC: <3 MG/L
EGFRCR SERPLBLD CKD-EPI 2021: >90 ML/MIN/1.73M2
EOSINOPHIL # BLD AUTO: 0.2 10E3/UL (ref 0–0.7)
EOSINOPHIL NFR BLD AUTO: 4 %
ERYTHROCYTE [DISTWIDTH] IN BLOOD BY AUTOMATED COUNT: 11.4 % (ref 10–15)
ERYTHROCYTE [SEDIMENTATION RATE] IN BLOOD BY WESTERGREN METHOD: 22 MM/HR (ref 0–30)
GLUCOSE SERPL-MCNC: 84 MG/DL (ref 70–99)
HCO3 SERPL-SCNC: 26 MMOL/L (ref 22–29)
HCT VFR BLD AUTO: 38.7 % (ref 35–47)
HGB BLD-MCNC: 13.3 G/DL (ref 11.7–15.7)
IMM GRANULOCYTES # BLD: 0 10E3/UL
IMM GRANULOCYTES NFR BLD: 0 %
LYMPHOCYTES # BLD AUTO: 1.6 10E3/UL (ref 0.8–5.3)
LYMPHOCYTES NFR BLD AUTO: 42 %
MCH RBC QN AUTO: 29.3 PG (ref 26.5–33)
MCHC RBC AUTO-ENTMCNC: 34.4 G/DL (ref 31.5–36.5)
MCV RBC AUTO: 85 FL (ref 78–100)
MONOCYTES # BLD AUTO: 0.3 10E3/UL (ref 0–1.3)
MONOCYTES NFR BLD AUTO: 7 %
NEUTROPHILS # BLD AUTO: 1.8 10E3/UL (ref 1.6–8.3)
NEUTROPHILS NFR BLD AUTO: 47 %
PLATELET # BLD AUTO: 238 10E3/UL (ref 150–450)
POTASSIUM SERPL-SCNC: 4.2 MMOL/L (ref 3.4–5.3)
PROT SERPL-MCNC: 8 G/DL (ref 6.4–8.3)
RBC # BLD AUTO: 4.54 10E6/UL (ref 3.8–5.2)
SODIUM SERPL-SCNC: 142 MMOL/L (ref 135–145)
URATE SERPL-MCNC: 5.6 MG/DL (ref 2.4–5.7)
WBC # BLD AUTO: 3.8 10E3/UL (ref 4–11)

## 2024-08-05 PROCEDURE — 73610 X-RAY EXAM OF ANKLE: CPT | Mod: TC | Performed by: RADIOLOGY

## 2024-08-05 PROCEDURE — 85025 COMPLETE CBC W/AUTO DIFF WBC: CPT | Performed by: FAMILY MEDICINE

## 2024-08-05 PROCEDURE — 84550 ASSAY OF BLOOD/URIC ACID: CPT | Performed by: FAMILY MEDICINE

## 2024-08-05 PROCEDURE — 99214 OFFICE O/P EST MOD 30 MIN: CPT | Performed by: FAMILY MEDICINE

## 2024-08-05 PROCEDURE — 36415 COLL VENOUS BLD VENIPUNCTURE: CPT | Performed by: FAMILY MEDICINE

## 2024-08-05 PROCEDURE — 99207 PR NO CHARGE LOS: CPT

## 2024-08-05 PROCEDURE — 85652 RBC SED RATE AUTOMATED: CPT | Performed by: FAMILY MEDICINE

## 2024-08-05 PROCEDURE — 80053 COMPREHEN METABOLIC PANEL: CPT | Performed by: FAMILY MEDICINE

## 2024-08-05 PROCEDURE — 86140 C-REACTIVE PROTEIN: CPT | Performed by: FAMILY MEDICINE

## 2024-08-05 NOTE — PROGRESS NOTES
Clinic Care Coordination Contact  Follow Up Progress Note      Assessment: follow up med compliance. Patient brought in all her prescription bottles with her today. She was able to verbalize how to take her meds correctly. No support needed. States someone in the community assisted her apply for rental assistance and SNAP, still pending.     Holter monitor   Per chart review, holter monitor was placed on 7/26/24. She needs to wear it for 30 days. Patient doesn't know how to return the holter monitor on 8/26/24. Patient would like assist from CCRN. Scheduled in person visit with CCRN on 8/26/24 at 11:00 am.     DME  Patient would like a cane. Message sent to Dr Muñoz. Gave patient hard copy to bring to [facility]. Patient will ask someone to take her today.     Plan: Patient will meet with CCRN on 8/26/24.      Price (Do Not Change): 0.00 Instructions: This plan will send the code FBSE to the PM system.  DO NOT or CHANGE the price. Detail Level: Simple

## 2024-08-05 NOTE — PROGRESS NOTES
"  Assessment & Plan     Acute left ankle pain  Suspect minor sprain. While she does not recall a specific injury, she does state that she frequently trips over the left toes (due to foot drop and not wearing brace in the house), forcing repeated minor forced plantarflexion/inversion of the left ankle. Unlikely gout or infection, but labs/xray as below and follow up based on results.  - XR Ankle Left G/E 3 Views  - Comprehensive metabolic panel (BMP + Alb, Alk Phos, ALT, AST, Total. Bili, TP)  - Uric acid  - CBC with platelets and differential  - ESR: Erythrocyte sedimentation rate  - CRP, inflammation    Left-sided weakness  History of stroke  Requests cane to help with balance and left sided weakness from stroke.   - Cane Order for DME - ONLY FOR DME    BMI  Estimated body mass index is 31.06 kg/m  as calculated from the following:    Height as of this encounter: 1.426 m (4' 8.14\").    Weight as of this encounter: 63.2 kg (139 lb 4 oz).           Has appt in Aug for estab care/physical/pap  Subjective   Rafael is a 54 year old, presenting for the following health issues:  Foot Pain (Left foot pain and swelling around ankle)        8/5/2024     1:25 PM   Additional Questions   Roomed by Gladys SNIDER     Left foot and ankle swelling and pain. Started after stroke in June, which affected left side and left her with foot drop. Sharp pain in ankle when she bears weight. Pain has been getting worse since July. Thinks brace fits well, does not think pain is related to brace - gets pain even when not wearing brace, sharp pain in ankle when bearing weight. No injury, but sometimes when walking, toes get caught due to foot drop (not wearing brace in the house) and can have minor rolls of ankle with that.    When someone cooks strong smelling foods - garlic, ankle swelling is worse. Not worse with eating certain foods. Gets worse with more walking.      Using ointments to help w pain.     Since stroke, sometimes the left leg has a " "burning pain    Pain right knee    Feeling of anxiety, possibly impending doom, \"out of body experience\", comes on every once in a while since having stroke.    Wearing event monitor and asks when to take it off and how to return                   Objective    /71   Pulse 57   Temp 99  F (37.2  C) (Oral)   Resp 16   Ht 1.426 m (4' 8.14\")   Wt 63.2 kg (139 lb 4 oz)   LMP  (LMP Unknown)   SpO2 99%   BMI 31.06 kg/m    Body mass index is 31.06 kg/m .  Physical Exam     Gen: NAD, well appearing  MSK: left ankle with mild swelling around lateral malleolus, mostly posterior to malleolus. No erythema, no increased warmth.  CV: feet are warm and well perfused without edema. Normal pedal pulses  Psych: presents on time and well groomed. Normal speech and thought content. Full affect.           Signed Electronically by: Fina Muñoz MD    Answers submitted by the patient for this visit:  General Questionnaire (Submitted on 8/5/2024)  Chief Complaint: Chronic problems general questions HPI Form  What is the reason for your visit today? : Left foot pain and swelling  How many servings of fruits and vegetables do you eat daily?: 2-3  On average, how many sweetened beverages do you drink each day (Examples: soda, juice, sweet tea, etc.  Do NOT count diet or artificially sweetened beverages)?: 0  How many minutes a day do you exercise enough to make your heart beat faster?: 9 or less  How many days a week do you exercise enough to make your heart beat faster?: 3 or less  How many days per week do you miss taking your medication?: 0    "

## 2024-08-06 ENCOUNTER — TELEPHONE (OUTPATIENT)
Dept: FAMILY MEDICINE | Facility: CLINIC | Age: 54
End: 2024-08-06
Payer: COMMERCIAL

## 2024-08-06 NOTE — TELEPHONE ENCOUNTER
----- Message from Fina Muñoz sent at 8/6/2024  2:58 PM CDT -----  Please call w results. Blood tests are normal and xray does not show any injury to the bones or ankle joint. I suspect she sprained her ankle, and I recommend she wear the foot brace that she was given after the stroke, even when she is in the house. I have placed a PT order to help with the ankle pain.

## 2024-08-07 ENCOUNTER — APPOINTMENT (OUTPATIENT)
Dept: INTERPRETER SERVICES | Facility: CLINIC | Age: 54
End: 2024-08-07
Payer: COMMERCIAL

## 2024-08-07 NOTE — TELEPHONE ENCOUNTER
Called patient, relayed test results to patient, patient verbalizes understanding of lab results.      Vitaliy Geiger, MSN, RN   [facility]

## 2024-08-22 ENCOUNTER — TELEPHONE (OUTPATIENT)
Dept: PHYSICAL THERAPY | Facility: REHABILITATION | Age: 54
End: 2024-08-22

## 2024-08-22 NOTE — TELEPHONE ENCOUNTER
spoke with patient re: No show eval. Patient stated she forgot to arrange a ride. Rescheduled appt for Monday 08/26. Reminded patient that she needs to come to appt 15 minutes early to fill out paperwork and that she needs to arrange her own transportation. Gave patient clinic number to call if she cannot come to appt at least 24 in advance.

## 2024-08-26 ENCOUNTER — TELEPHONE (OUTPATIENT)
Dept: PHYSICAL THERAPY | Facility: REHABILITATION | Age: 54
End: 2024-08-26

## 2024-08-26 ENCOUNTER — PATIENT OUTREACH (OUTPATIENT)
Dept: CARE COORDINATION | Facility: CLINIC | Age: 54
End: 2024-08-26

## 2024-08-26 ENCOUNTER — ALLIED HEALTH/NURSE VISIT (OUTPATIENT)
Dept: NURSING | Facility: CLINIC | Age: 54
End: 2024-08-26
Payer: COMMERCIAL

## 2024-08-26 DIAGNOSIS — Z71.89 COMPLEX CARE COORDINATION: Primary | ICD-10-CM

## 2024-08-26 PROCEDURE — 99207 PR NO CHARGE LOS: CPT

## 2024-08-26 NOTE — PROGRESS NOTES
Clinic Care Coordination Contact  Community Health Worker Follow Up    Care Gaps:   Health Maintenance Due   Topic Date Due    ADVANCE CARE PLANNING  Never done    COLORECTAL CANCER SCREENING  Never done    YEARLY PREVENTIVE VISIT  05/31/2020    ZOSTER IMMUNIZATION (1 of 2) Never done    COVID-19 Vaccine (4 - 2023-24 season) 09/01/2023    HPV TEST  05/31/2024    PAP  05/31/2024     Scheduled in November 2024 to establish care.      Care Plan:   Care Plan: Establish care       Problem: est care       Goal: Patient would like to establish care with a new PCP in the next 90 days.       Start Date: 6/20/2024 Expected End Date: 9/30/2024    This Visit's Progress: 40% Recent Progress: 30%    Note:     Barriers: language barrier, low literacy, noncompliance, and lack of knowledge how to navigate complex health care system  Strengths: motivated to attend appt  Patient expressed understanding of goal: Yes    Action steps to achieve this goal:  1. I will answer my phone when I am contacted to schedule my appointment.  2. I will attend my establish care appointment as scheduled on 8/22/2024 at 1:40pm. No showed  3. CCC RN will assist patient to reschedule establish care with a provider while in the clinic.  4. I will follow up with CCC regarding this goal at each outreach until it is completed.                               Care Plan: Neurology       Problem: CVA       Goal: Patient will attend her neurology appointment in the next 6 months.       Start Date: 6/20/2024 Expected End Date: 12/31/2024    This Visit's Progress: 30% Recent Progress: 20%    Note:     Barriers: language barrier, low literacy, noncompliance, and lack of knowledge how to navigate complex health care system  Strengths: motivated to attend appt  Patient expressed understanding of goal: Yes    Action steps to achieve this goal:  1. I will attend my neurology appointment as scheduled on 6/24/24 at 2:15pm. Completed.  2. I will attend my 4 months follow up  appointment with neurologist as scheduled on 10/10/2024 at 2:15 PM.  3. I will follow up with CCC regarding this goal at each outreach until it is completed.                             Care Plan: PCA service       Problem: imparied mobility       Goal: Patient would like to explore if she could qualify for PCA service in the next 12 months.       Start Date: 7/16/2024 Expected End Date: 7/31/2025    This Visit's Progress: 20% Recent Progress: 20%    Note:     Barriers: language  Strengths: Willing to accept CCC support,   Patient expressed understanding of goal: Yes    Action steps to achieve this goal:  1. I will answer my phone when [facility]  calls to schedule an in-person assessment.   2. I will provide accurate information when needed.  3. I will update CCC team at outreach.     Notes: CHW placed referral to [facility] today, 8/26/2024 and wait time is 5 to 6 months. Referral also place to [facility] today, 6/26/2024.                          Intervention and Education during outreach:  -CHW placed referral to [facility] and [facility] today.   -CHW reached out to [facility] to obtain ARM worker information and left a voice message to call back. [facility] contact: [phone].  -CHW arranged transportation for PT appointment on 9/24/2024 with [facility].  -Patient was informed to call with questions or concerns.     CHW Next Outreach: In one month.

## 2024-08-26 NOTE — PROGRESS NOTES
Clinic Care Coordination Contact  Follow Up Progress Note      Assessment: CCRN met with patient to assist her mail back holter monitor today. [place]  staff scheduled UPS pick-up today. CONFIRMATION  [id number].     Cane and incontinence supply  Gave hard copy scripts to patient today. She will ask her [employer] worker to take her to [facility]. Instructed patient to bring her insurance card with her. CCRN provided patient with [facility] address and business hours today. [employer] worker is scheduled to come see patient in home today. Patient wasn't able to provide CCRN with [facility] worker's name or phone number but will ask her armhs worker to call CCRN today to provider CCRN with arm worker's name and phone number to add to care team. Patient states she already has a walker at home. Canceled appt with a provider at [facility] for a walker.     [email or web address]  [address] [place] [address] [phone]    Establish care   Per chart review, patient no showed her est. Care appointment on 8/22/24. Assisted patient rescheduled to establish care with Dr Roman on 11/18/24 at 3:10pm. Goal updated.     PT  Patient no showed PT appointment x2. Patient agreed to rescheduled on 9/24/24 at 12:35 pm. New goal created.     Care Gaps:    Health Maintenance Due   Topic Date Due    ADVANCE CARE PLANNING  Never done    COLORECTAL CANCER SCREENING  Never done    YEARLY PREVENTIVE VISIT  05/31/2020    ZOSTER IMMUNIZATION (1 of 2) Never done    COVID-19 Vaccine (4 - 2023-24 season) 09/01/2023    HPV TEST  05/31/2024    PAP  05/31/2024       Patient will address overdue care gaps with Dr Roman on 11/18/24.     Care Plans  Care Plan: Establish care       Problem: est care       Goal: Patient would like to establish care with a new PCP in the next 6 months.       Start Date: 6/20/2024 Expected End Date: 12/31/2024    Recent Progress: 30%    Note:     Barriers: language  barrier, low literacy, noncompliance, and lack of knowledge how to navigate complex health care system  Strengths: motivated to attend appt  Patient expressed understanding of goal: Yes    Action steps to achieve this goal:  1. I will answer my phone when I am contacted to schedule my appointment.  2. I will attend my establish care appointment as scheduled on 8/22/2024 at 1:40pm. No showed  3. I will attend my rescheduled est care appointment with Dr Roman on 11/28/2024 at 3:10 pm.   4. CCC RN will assist patient to reschedule establish care with a provider while in the clinic.  5. I will follow up with [facility] regarding this goal at each outreach until it is completed.                               Care Plan: Neurology       Problem: CVA       Goal: Patient will attend her neurology appointment in the next 6 months.       Start Date: 6/20/2024 Expected End Date: 12/31/2024    Recent Progress: 30%    Note:     Barriers: language barrier, low literacy, noncompliance, and lack of knowledge how to navigate complex health care system  Strengths: motivated to attend appt  Patient expressed understanding of goal: Yes    Action steps to achieve this goal:  1. I will attend my neurology appointment as scheduled on 6/24/24 at 2:15pm. Completed.  2. I will attend my 4 months follow up appointment with neurologist as scheduled on 10/10/2024 at 2:15 PM.  3. I will follow up with [facility] regarding this goal at each outreach until it is completed.                             Care Plan: PCA service       Problem: imparied mobility       Goal: Patient would like to explore if she could qualify for PCA service in the next 12 months.       Start Date: 7/16/2024 Expected End Date: 7/31/2025    This Visit's Progress: 20% Recent Progress: 20%    Note:     Barriers: language  Strengths: Willing to accept CCC support,   Patient expressed understanding of goal: Yes    Action steps to achieve this goal:  1. I will answer my phone when [facility]   calls to schedule an in-person assessment.   2. I will provide accurate information when needed.  3. I will update CCC team at outreach.     Notes: CHW placed referral to [facility] today, 8/26/2024 and wait time is 5 to 6 months. Referral also place to [facility] today, 6/26/2024.                            Care Plan: PT       Problem: Impaired mobility       Goal: Patient will attend PT appointment in the next 4 months.       Start Date: 8/26/2024 Expected End Date: 12/31/2024    This Visit's Progress: 20%    Note:     Barriers: language  Strengths: Willing to accept support  Patient expressed understanding of goal: Yes    Action steps to achieve this goal:  1. I will answer my phone when I am contacted to schedule my initial Physical Therapy appointment.  2. I will attend my initial Physical Therapy appointment on on 9/24/24 at 12:35pm.   3. I will follow up with CCC regarding this goal at each outreach until it is completed.                                     Plan:   1) CHW to follow up on a cane and incontinence supply status. [facility] worker supposed to assist.

## 2024-08-26 NOTE — TELEPHONE ENCOUNTER
No show eval x2- left message with patient. Same  spoke with patient last week when she did not show to eval.-JR

## 2024-08-27 ENCOUNTER — VIRTUAL VISIT (OUTPATIENT)
Dept: FAMILY MEDICINE | Facility: CLINIC | Age: 54
End: 2024-08-27
Payer: COMMERCIAL

## 2024-08-27 DIAGNOSIS — I63.9 MULTIPLE CEREBRAL INFARCTIONS (H): ICD-10-CM

## 2024-08-27 DIAGNOSIS — J30.89 NON-SEASONAL ALLERGIC RHINITIS, UNSPECIFIED TRIGGER: ICD-10-CM

## 2024-08-27 DIAGNOSIS — R22.42 LOCALIZED SWELLING OF LEFT LOWER EXTREMITY: ICD-10-CM

## 2024-08-27 DIAGNOSIS — Z86.73 HISTORY OF STROKE: ICD-10-CM

## 2024-08-27 DIAGNOSIS — R05.9 COUGH, UNSPECIFIED TYPE: Primary | ICD-10-CM

## 2024-08-27 DIAGNOSIS — R53.1 LEFT-SIDED WEAKNESS: ICD-10-CM

## 2024-08-27 DIAGNOSIS — N39.3 FEMALE STRESS INCONTINENCE: ICD-10-CM

## 2024-08-27 PROCEDURE — 99442 PR PHYSICIAN TELEPHONE EVALUATION 11-20 MIN: CPT | Mod: 93 | Performed by: FAMILY MEDICINE

## 2024-08-27 RX ORDER — CETIRIZINE HYDROCHLORIDE 10 MG/1
10 TABLET ORAL DAILY
Qty: 30 TABLET | Refills: 1 | Status: SHIPPED | OUTPATIENT
Start: 2024-08-27

## 2024-08-27 NOTE — PROGRESS NOTES
Telehealth Visit      Provider discussed the limitations of the telehealth visit and patient would like to proceed with the encounter.  Both patient and provider agree that a telehealth visit would be appropriate to address patient's concerns today.    Location of patient: [place], [place]  Location of provider: [place] [employer] [place]    Time at start of telehealth visit: 1:26 PM  Time at start of telehealth visit: 1:40 PM  Time spent in direct cares for telehealth visit: 14 minutes        Assessment/Plan:       Paw was seen today for cough, patient needs help to apply for ssi and snap and refill request.  Diagnoses and all orders for this visit:    Cough, unspecified type  Non-seasonal allergic rhinitis, unspecified trigger: Most likely based on history.  Patient speaking full sentences without any respiratory distress during conversation.  Patient does have a history of nonseasonal allergic rhinitis.  Likely contributing.  Plan as below.  -     cetirizine (ZYRTEC) 10 MG tablet; Take 1 tablet (10 mg) by mouth daily.    Multiple cerebral infarctions (H)  H/O Rt UZIEL & MCA stroke  Left-sided weakness  Female stress incontinence: The stroke had occurred on 6/17/2024.  With these changes, it has been significantly difficult for patient to go to the bathroom.  Order placed for diapers/briefs as below.  Order also placed for care coordination referral to assist in applying for SSI/SNAP.  -     Incontinence Supplies Order for DME - ONLY FOR DME  -     Primary Care - Care Coordination Referral; Future    Localized swelling of left lower extremity: Present since hospitalization.  Possibly related to stroke.  Complete ultrasound to rule out DVT.  -     Primary Care - Care Coordination Referral; Future  -     US Lower Extremity Venous Duplex Left; Future        Return in about 1 month (around 9/27/2024) for right arm pain, follow up cough.    The diagnoses, treatment options, risk, benefits, and recommendations were reviewed with  patient/guardian.  Questions were answered to patient's/guardian satisfaction.  Red flag signs were reviewed.  Patient/guardian is in agreement with above plan.      Subjective: 54 year old female with history of allergic rhinitis, multiple cerebral infarcts (right UZIEL and MCA strokes), left-sided weakness, female stress urinary incontinence, hyperlipidemia, prediabetes, vitamin D deficiency who presents to clinic for the following complaints:   Patient presents with:  Cough: throat itchy cough for a month now   Patient needs help to apply for SSI and SNAP  Refill Request: (Diapers - Brief) due to urinary incontinence, leaking of urine.     Answers submitted by the patient for this visit:  General Questionnaire (Submitted on 8/27/2024)  Chief Complaint: Chronic problems general questions HPI Form  General Concern (Submitted on 8/27/2024)  Chief Complaint: Chronic problems general questions HPI Form  What is the reason for your visit today?: throat itchy cough for a month now  When did your symptoms begin?: More than a month  What are your symptoms?: coughing  How would you describe these symptoms?: Moderate  Are your symptoms:: Staying the same  Have you had these symptoms before?: Yes  Have you tried or received treatment for these symptoms before?: No  Is there anything that makes you feel worse?: drinking cold water  Is there anything that makes you feel better?: have to drink warm water all the time      Cough: The itching is on the inside of the throat. Happens at all times. Worse at night. Overall unchanged since it started. Coughing is noted and is sometimes productive of yellow phlegm. There is some runny nose noted. Denies fevers, chills, nausea, vomiting, shortness of breath, or other changes from baseline.     Paresthesia: On the left side (stroke affected side), there is some burning sensations that have been noted. The area is also swollen at the ankles at times and it has been going on for a while.  Unchanged since having the stroke.     On the right arm, she has aches and pains, numbness and tingling. It is difficult to hold onto things.     Female stress urinary incontinence: ongoing issue. Difficulty with getting to bathroom due to history of stroke and residual left sided weakness.     Social Security Income/SNAP: referral placed to care coordination.     A professional Claudia telephone  was utilized for the office visit.     The 10 point review of system is negative except as stated in the HPI.    Allergies were reviewed and updated.    Objective:   LMP  (LMP Unknown)   General: Active, alert. No acute distress.  Answering questions appropriately.  Respiratory/chest: Speaking full sentences.  Breathing is not labored.          Paco Us MD  [facility] [place] [address]  Phone: [phone]  Fax: [phone]    8/27/2024  1:45 PM          Current Outpatient Medications   Medication Sig Dispense Refill    aspirin (ASA) 81 MG chewable tablet Take 1 tablet (81 mg) by mouth daily 90 tablet 3    atorvastatin (LIPITOR) 80 MG tablet Take 1 tablet (80 mg) by mouth every evening 90 tablet 3    cetirizine (ZYRTEC) 10 MG tablet Take 1 tablet (10 mg) by mouth daily. 30 tablet 1    clopidogrel (PLAVIX) 75 MG tablet Take 1 tablet (75 mg) by mouth daily 90 tablet 3    losartan (COZAAR) 25 MG tablet Take 1 tablet (25 mg) by mouth daily 90 tablet 3     No current facility-administered medications for this visit.       Allergies   Allergen Reactions    Shellfish-Derived Products Anaphylaxis    Amoxicillin Rash    Contrave [Naltrexone-Bupropion Hcl Er] Unknown    Sulfamethoxazole-Trimethoprim [Sulfamethoxazole-Trimethoprim] Itching and Rash       Patient Active Problem List    Diagnosis Date Noted    Female stress incontinence 07/05/2024     Priority: Medium    Intracranial atherosclerosis 06/24/2024     Priority: Medium    H/O Rt UZIEL & MCA stroke 06/24/2024     Priority:  Medium    Prediabetes 06/24/2024     Priority: Medium    Left-sided weakness 06/18/2024     Priority: Medium    Multiple cerebral infarctions (H) 06/18/2024     Priority: Medium    Allergic rhinitis      Priority: Medium     Created by [facility] Annotation: May 15 2012 11:10PM - Che Rosario: -   5/3/12, Dr HA Beckwith: rxd daily loratadine and prn nasal fluticasone- 5/17/12: pt   repots   improved symptoms, advised cont loratadine/fluticasone prn        Hyperlipidemia LDL goal <130      Priority: Medium     Created by [facility] Annotation: Oct  1 2008  7:45PM - Che Rosario: See   annotations.- 10/1/08: fasting lipids T279//H54/L185; rxd lifestyle   mod,   weight loss- 1/2010: rxd/started simvastatin for persistently elevated   LDLs   >180- 3/15/11, fasting: T230/TG88/L141, incr simavastatin to 40mg hs-   4/25/12,   fasting: T242//H48/L161; off statin x2mo, advised weight   loss/exercise,   d/c statin for now        Vitamin D Deficiency      Priority: Medium     Created by [facility] Annotation: Mar 20 2009  7:48PM - Abhi Oneil: SEE   ANNOTATIONS-   3/19/09: vit D 12; rxd vit D 50kU/wk x8wk- 4/25/12: vit D 19.4, off meds;   re-rxd vit D 2000U/day and daily MVI  Replacement Utility updated for latest IMO load        Lumbar back pain 11/05/2015     Priority: Medium    Deafness in left ear 06/04/2015     Priority: Medium     Since age 14, has no eardrum        Neck Cervical Mass Left (___cm)      Priority: Medium     Created by [facility] Annotation: Oct  1 2008  1:03PM - Che Rosario: \R\3cm.    Soft. Does not seem to move with swallow. Noted on exam 10/1/08.  TSH 1.4   (10/1/08).        Obesity      Priority: Medium     Created by [facility] Annotation: Mar 19 2009  6:22PM - Che Rosario: BMI 30.8   (3/2009)           Family History   Problem Relation Age of Onset    Breast Cancer No family hx of     Ovarian  Cancer No family hx of        No past surgical history on file.     Social History     Socioeconomic History    Marital status:      Spouse name: Not on file    Number of children: Not on file    Years of education: Not on file    Highest education level: Not on file   Occupational History    Not on file   Tobacco Use    Smoking status: Never     Passive exposure: Never    Smokeless tobacco: Never   Vaping Use    Vaping status: Never Used   Substance and Sexual Activity    Alcohol use: No    Drug use: No    Sexual activity: Never   Other Topics Concern    Not on file   Social History Narrative    Mya.  Arrived  2008     Social Determinants of Health     Financial Resource Strain: Not on file   Food Insecurity: Not on file   Transportation Needs: Not on file   Physical Activity: Not on file   Stress: Not on file   Social Connections: Not on file   Interpersonal Safety: Low Risk  (3/8/2024)    Interpersonal Safety     Do you feel physically and emotionally safe where you currently live?: Yes     Within the past 12 months, have you been hit, slapped, kicked or otherwise physically hurt by someone?: No     Within the past 12 months, have you been humiliated or emotionally abused in other ways by your partner or ex-partner?: No   Housing Stability: Not on file

## 2024-08-28 PROCEDURE — 93272 ECG/REVIEW INTERPRET ONLY: CPT | Performed by: INTERNAL MEDICINE

## 2024-08-29 ENCOUNTER — HOSPITAL ENCOUNTER (OUTPATIENT)
Dept: ULTRASOUND IMAGING | Facility: HOSPITAL | Age: 54
Discharge: HOME | End: 2024-08-29
Attending: FAMILY MEDICINE | Admitting: FAMILY MEDICINE
Payer: COMMERCIAL

## 2024-08-29 DIAGNOSIS — R22.42 LOCALIZED SWELLING OF LEFT LOWER EXTREMITY: ICD-10-CM

## 2024-08-29 PROCEDURE — T1013 SIGN LANG/ORAL INTERPRETER: HCPCS | Mod: U4

## 2024-08-29 PROCEDURE — 93971 EXTREMITY STUDY: CPT | Mod: LT

## 2024-09-18 ENCOUNTER — APPOINTMENT (OUTPATIENT)
Dept: INTERPRETER SERVICES | Facility: CLINIC | Age: 54
End: 2024-09-18
Payer: COMMERCIAL

## 2024-09-19 NOTE — PROGRESS NOTES
PHYSICAL THERAPY EVALUATION  Type of Visit: Evaluation       Fall Risk Screen:  Fall screen completed by: PT  Have you fallen 2 or more times in the past year?: No  Have you fallen and had an injury in the past year?: No  Is patient a fall risk?: No    Subjective       Presenting condition or subjective complaint: pain in the ankle and foot  Date of onset: 06/01/24    Relevant medical history: High blood pressure; Menopause   Dates & types of surgery: 1974 surgery in the head plus surgery in the upper back 2008    Prior diagnostic imaging/testing results: X-ray     Prior therapy history for the same diagnosis, illness or injury: No      Prior Level of Function  Transfers:   Ambulation:   ADL:   IADL:     Living Environment  Social support:     Type of home:     Stairs to enter the home:         Ramp:     Stairs inside the home:         Help at home:    Equipment owned:       Employment:      Hobbies/Interests:      Patient goals for therapy: wants to be able to stand for a period of time so that i can go back to work without causing me additional pain    Pain assessment: Pain present- sharp, throbbing pain, tingling     Objective   FOOT/ANKLE EVALUATION  PAIN: Pain Level at Rest: 5/10  Pain Level with Use: 10/10  Pain Location: left foot and ankle- achilles tendon  Pain is Exacerbated By: standing  Pain is Relieved By: medications from MD  Pain Progression: Unchanged  INTEGUMENTARY (edema, incisions):   POSTURE:   GAIT:   Weightbearing Status: WBAT  Assistive Device(s): None  Gait Deviations: Base of support increased  Stride length decreased  Lore decreased  BALANCE/PROPRIOCEPTION:   WEIGHT BEARING ALIGNMENT:  aidee pes planus  NON-WEIGHTBEARING ALIGNMENT:    ROM:   (Degrees) Left AROM Left PROM  Right AROM Right PROM   Ankle Dorsiflexion 7 deg from neutral 0 deg 5 deg from neutral 0 deg   Ankle Plantarflexion       Ankle Inversion       Ankle Eversion       Great Toe Flexion       Great Toe Extension       Pain:    End feel:     STRENGTH:   MMT Left Right   Hip flexion 5/5 5/5   Hip ER 4/5 4/5   Hip IR /5 /5   Hip extension /5 /5   Hip Abd /5 /5   Hip Add /5 /5       Pain: - none + mild ++ moderate +++ severe  Strength Scale: 0-5/5 Left Right   Ankle Dorsiflexion 5 5   Ankle Plantarflexion 1 5   Ankle Inversion 5 5   Ankle Eversion 5 5   Great Toe Flexion     Great Toe Extension     Anterior Tibialis     Posterior Tibialis     Peroneals     Extensor Digitorum     Gastroc/Soleus       FLEXIBILITY:   SPECIAL TESTS:   FUNCTIONAL TESTS:   PALPATION:  Not tender with palpation to aidee achilles tendon but pt reports on L this is location of her pain  JOINT MOBILITY:     Assessment & Plan   CLINICAL IMPRESSIONS  Medical Diagnosis: Acute left ankle pain  Left-sided weakness  History of stroke    Treatment Diagnosis: Acute left ankle pain Left-sided weakness History of stroke   Impression/Assessment: Patient is a 54 year old female with L ankle pain and weakness complaints.  The following significant findings have been identified: Pain, Decreased ROM/flexibility, Decreased joint mobility, Decreased strength, Impaired balance, Impaired gait, Decreased activity tolerance, and Impaired posture. These impairments interfere with their ability to perform recreational activities, household chores, household mobility, and community mobility as compared to previous level of function.     Clinical Decision Making (Complexity):  Clinical Presentation: Stable/Uncomplicated  Clinical Presentation Rationale: based on medical and personal factors listed in PT evaluation  Clinical Decision Making (Complexity): Low complexity    PLAN OF CARE  Treatment Interventions:  Modalities: Cryotherapy, Hot Pack  Interventions: Gait Training, Manual Therapy, Neuromuscular Re-education, Therapeutic Activity, Therapeutic Exercise, Self-Care/Home Management    Long Term Goals     PT Goal 1  Goal Identifier: Stand  Goal Description: Pt will be able to stand for >1  hour with <4/10 pain to return to employment  Target Date: 12/17/24  PT Goal 2  Goal Identifier: walking  Goal Description: Pt will be able to walk for >10 min with <4/10 pain and normal walking pattern to improve community mobility  Target Date: 12/17/24      Frequency of Treatment: 1x/week, reducing frequency as able  Duration of Treatment: 12 weeks    Recommended Referrals to Other Professionals:   Education Assessment:   Learner/Method: Patient;Pictures/Video    Risks and benefits of evaluation/treatment have been explained.   Patient/Family/caregiver agrees with Plan of Care.     Evaluation Time:     PT Eval, Low Complexity Minutes (95364): 20       Signing Clinician: Fina Hull, PT        [facility]                                                                                   OUTPATIENT PHYSICAL THERAPY      PLAN OF TREATMENT FOR OUTPATIENT REHABILITATION   Patient's Last Name, First Name, M.I.  Bi,Paw    YOB: 1970   Provider's Name   [facility]   Medical Record No.  [id number]     Onset Date: 06/01/24  Start of Care Date: 09/24/24     Medical Diagnosis:  Acute left ankle pain  Left-sided weakness  History of stroke      PT Treatment Diagnosis:  Acute left ankle pain Left-sided weakness History of stroke Plan of Treatment  Frequency/Duration: 1x/week, reducing frequency as able/ 12 weeks    Certification date from 09/24/24 to 12/17/24         See note for plan of treatment details and functional goals     Fina Hull, PT                         I CERTIFY THE NEED FOR THESE SERVICES FURNISHED UNDER        THIS PLAN OF TREATMENT AND WHILE UNDER MY CARE     (Physician attestation of this document indicates review and certification of the therapy plan).              Referring Provider:  Fina Muñoz    Initial Assessment  See Epic Evaluation- Start of Care Date: 09/24/24

## 2024-09-24 ENCOUNTER — THERAPY VISIT (OUTPATIENT)
Dept: PHYSICAL THERAPY | Facility: REHABILITATION | Age: 54
End: 2024-09-24
Payer: COMMERCIAL

## 2024-09-24 DIAGNOSIS — M25.572 ACUTE LEFT ANKLE PAIN: Primary | ICD-10-CM

## 2024-09-24 DIAGNOSIS — Z86.73 HISTORY OF STROKE: ICD-10-CM

## 2024-09-24 DIAGNOSIS — R53.1 LEFT-SIDED WEAKNESS: ICD-10-CM

## 2024-09-24 PROCEDURE — 97161 PT EVAL LOW COMPLEX 20 MIN: CPT | Mod: GP | Performed by: PHYSICAL THERAPIST

## 2024-09-24 PROCEDURE — 97110 THERAPEUTIC EXERCISES: CPT | Mod: GP | Performed by: PHYSICAL THERAPIST

## 2024-09-24 ASSESSMENT — ACTIVITIES OF DAILY LIVING (ADL)
GETTING_INTO_AND_OUT_OF_A_BATH: MODERATE DIFFICULTY
WALKING_A_MILE: EXTREME DIFFICULTY OR UNABLE TO PERFORM ACTIVITY
RUNNING_ON_EVEN_GROUND: QUITE A BIT OF DIFFICULTY
MAKING_SHARP_TURNS_WHILE_RUNNING_FAST: EXTREME DIFFICULTY OR UNABLE TO PERFORM ACTIVITY
PUTTING_ON_YOUR_SHOES_OR_SOCKS: MODERATE DIFFICULTY
PERFORMING_LIGHT_ACTIVITIES_AROUND_YOUR_HOME: A LITTLE BIT OF DIFFICULTY
LIFTING_AN_OBJECT,_LIKE_A_BAG_OF_GROCERIES_FROM_THE_FLOOR: MODERATE DIFFICULTY
LEFS_RAW_SCORE: 0
YOUR_USUAL_HOBBIES,_RECREATIONAL_OR_SPORTING_ACTIVITIES: QUITE A BIT OF DIFFICULTY
ROLLING_OVER_IN_BED: NO DIFFICULTY
GETTING_INTO_OR_OUT_OF_A_CAR: MODERATE DIFFICULTY
WALKING_2_BLOCKS: QUITE A BIT OF DIFFICULTY
SHOPPING: EXTREME DIFFICULTY OR UNABLE TO PERFORM ACTIVITY
LEFS_SCORE(%): 0
SQUATTING: EXTREME DIFFICULTY OR UNABLE TO PERFORM ACTIVITY
SITTING_FOR_1_HOUR: A LITTLE BIT OF DIFFICULTY
STANDING_FOR_1_HOUR: QUITE A BIT OF DIFFICULTY
PLEASE_INDICATE_YOR_PRIMARY_REASON_FOR_REFERRAL_TO_THERAPY:: FOOT AND/OR ANKLE
GOING_UP_OR_DOWN_10_STAIRS: QUITE A BIT OF DIFFICULTY
RUNNING_ON_UNEVEN_GROUND: QUITE A BIT OF DIFFICULTY
PERFORMING_HEAVY_ACTIVITIES_AROUND_YOUR_HOME: A LITTLE BIT OF DIFFICULTY
WALKING_BETWEEN_ROOMS: QUITE A BIT OF DIFFICULTY
ANY_OF_YOUR_USUAL_WORK,_HOUSEWORK_OR_SCHOOL_ACTIVITIES: QUITE A BIT OF DIFFICULTY

## 2024-09-26 ENCOUNTER — PATIENT OUTREACH (OUTPATIENT)
Dept: CARE COORDINATION | Facility: CLINIC | Age: 54
End: 2024-09-26
Payer: COMMERCIAL

## 2024-09-26 NOTE — PROGRESS NOTES
Clinic Care Coordination Contact  Community Health Worker Follow Up    Care Gaps:   Health Maintenance Due   Topic Date Due    ADVANCE CARE PLANNING  Never done    COLORECTAL CANCER SCREENING  Never done    YEARLY PREVENTIVE VISIT  05/31/2020    ZOSTER IMMUNIZATION (1 of 2) Never done    HPV TEST  05/31/2024    PAP  05/31/2024    INFLUENZA VACCINE (1) 09/01/2024    COVID-19 Vaccine (4 - 2024-25 season) 09/01/2024     Scheduled 1/07/2024 for preventive care visit      Care Plan:   Care Plan: Establish care       Problem: est care       Goal: Patient would like to establish care with a new PCP in the next 6 months.       Start Date: 6/20/2024 Expected End Date: 12/31/2024    This Visit's Progress: 50% Recent Progress: 40%    Note:     Barriers: language barrier, low literacy, noncompliance, and lack of knowledge how to navigate complex health care system  Strengths: motivated to attend appt  Patient expressed understanding of goal: Yes    Action steps to achieve this goal:  1. I will answer my phone when I am contacted to schedule my appointment.  2. I will attend my establish care appointment as scheduled on 8/22/2024 at 1:40pm. No showed  3. I will attend my rescheduled est care appointment with Dr Roman on 11/28/2024 at 3:10 pm.   4. CCC RN will assist patient to reschedule establish care with a provider while in the clinic.  5. I will follow up with CCC regarding this goal at each outreach until it is completed.                             Care Plan: Neurology       Problem: CVA       Goal: Patient will attend her neurology appointment in the next 6 months.       Start Date: 6/20/2024 Expected End Date: 12/31/2024    This Visit's Progress: 40% Recent Progress: 30%    Note:     Barriers: language barrier, low literacy, noncompliance, and lack of knowledge how to navigate complex health care system  Strengths: motivated to attend appt  Patient expressed understanding of goal: Yes    Action steps to achieve this  goal:  1. I will attend my neurology appointment as scheduled on 6/24/24 at 2:15pm. Completed.  2. I will attend my 4 months follow up appointment with neurologist as scheduled on 10/10/2024 at 2:15 PM. Transportation arranged with [facility].  3. I will follow up with CCC regarding this goal at each outreach until it is completed.                             Care Plan: PCA service       Problem: imparied mobility       Goal: Patient would like to explore if she could qualify for PCA service in the next 12 months.       Start Date: 7/16/2024 Expected End Date: 7/31/2025    This Visit's Progress: 30% Recent Progress: 20%    Note:     Barriers: language  Strengths: Willing to accept CCC support,   Patient expressed understanding of goal: Yes    Action steps to achieve this goal:  1. I will answer my phone when [facility]  calls to schedule an in-person assessment.   2. I will provide accurate information when needed.  3. I will update CCC team at outreach.     Notes: CHW placed referral to [facility] today, 8/26/2024 and wait time is 5 to 6 months and pending. Referral also place to [facility] today, 6/26/2024.                            Care Plan: PT       Problem: Impaired mobility       Goal: Patient will attend PT appointment in the next 4 months.       Start Date: 8/26/2024 Expected End Date: 12/31/2024    This Visit's Progress: 30% Recent Progress: 20%    Note:     Barriers: language  Strengths: Willing to accept support  Patient expressed understanding of goal: Yes    Action steps to achieve this goal:  1. I will answer my phone when I am contacted to schedule my initial Physical Therapy appointment.  2. I will attend my initial Physical Therapy appointment on on 9/24/24 at 12:35pm. Attended.  3. I will follow up PT appointments as scheduled on 10/08/2024 at 1:50 PM and 10/22/2024 at 1:50 PM. Transportation arranged with [facility].   4. I will update CCC team at outreach.                              Intervention and Education during outreach:  -CHW reminded patient of upcoming appointments and transportation arranged as appropriated.  -Patient has not received care yet and unable to connect with [facility] worker from [facility]: [phone]. CHW called and left a voice message to call back.   -[facility] unable take this patient for [facility] services because it's showing patient is receiving [facility] services at [facility] and patient has not been receiving home visit.   -Patient was informed to call with questions or concerns.     CHW Next Outreach: In one month.

## 2024-10-07 ENCOUNTER — PATIENT OUTREACH (OUTPATIENT)
Dept: CARE COORDINATION | Facility: CLINIC | Age: 54
End: 2024-10-07
Payer: COMMERCIAL

## 2024-10-07 NOTE — PROGRESS NOTES
Clinic Care Coordination Contact  Care Coordination Clinician Chart Review    Situation: Patient chart reviewed by Care Coordinator.       Background: Care Coordination Program started: 6/20/2024. Initial assessment completed and patient-centered care plan(s) were developed with participation from patient. Lead CC handed patient off to CHW for continued outreaches.       Assessment: Per chart review, patient outreach completed by CC CHW on 9/26/24.  Patient is actively working to accomplish goal(s). Patient's goal(s) appropriate and relevant at this time. Patient is due for updated Plan of Care.  Assessments will be completed annually or as needed/with change of patient status.    Establish care   Patient is scheduled to establish care with Dr Roman on 11/18/2024. Goal up to date.     Neurology   Per chart review, patient is scheduled to follow up with his neurologist on 10/10/24 at 2:15 pm. Goal up to date.     PCA service  Per chart review, PCA assessment is still pending. Goal up to date.     PT   Per chart review, patient is scheduled with a PT on 10/8/24. Goal up to date.     [facility]  [facility]W to follow up with patient if she was able to get a cane and incontinence supply. New goal created/        Care Plan: Establish care       Problem: est care       Goal: Patient would like to establish care with a new PCP in the next 6 months.       Start Date: 6/20/2024 Expected End Date: 12/31/2024    This Visit's Progress: 50% Recent Progress: 40%    Note:     Barriers: language barrier, low literacy, noncompliance, and lack of knowledge how to navigate complex health care system  Strengths: motivated to attend appt  Patient expressed understanding of goal: Yes    Action steps to achieve this goal:  1. I will answer my phone when I am contacted to schedule my appointment.  2. I will attend my establish care appointment as scheduled on 8/22/2024 at 1:40pm. No showed  3. I will attend my rescheduled est care appointment with Dr Roman  on 11/28/2024 at 3:10 pm.   4. CCC RN will assist patient to reschedule establish care with a provider while in the clinic.  5. I will follow up with CCC regarding this goal at each outreach until it is completed.                             Care Plan: Neurology       Problem: CVA       Goal: Patient will attend her neurology appointment in the next 6 months.       Start Date: 6/20/2024 Expected End Date: 12/31/2024    This Visit's Progress: 40% Recent Progress: 30%    Note:     Barriers: language barrier, low literacy, noncompliance, and lack of knowledge how to navigate complex health care system  Strengths: motivated to attend appt  Patient expressed understanding of goal: Yes    Action steps to achieve this goal:  1. I will attend my neurology appointment as scheduled on 6/24/24 at 2:15pm. Completed.  2. I will attend my 4 months follow up appointment with neurologist as scheduled on 10/10/2024 at 2:15 PM. Transportation arranged with [facility].  3. I will follow up with CCC regarding this goal at each outreach until it is completed.                             Care Plan: PCA service       Problem: imparied mobility       Goal: Patient would like to explore if she could qualify for PCA service in the next 12 months.       Start Date: 7/16/2024 Expected End Date: 7/31/2025    This Visit's Progress: 30% Recent Progress: 20%    Note:     Barriers: language  Strengths: Willing to accept CCC support,   Patient expressed understanding of goal: Yes    Action steps to achieve this goal:  1. I will answer my phone when [facility]  calls to schedule an in-person assessment.   2. I will provide accurate information when needed.  3. I will update CCC team at outreach.     Notes: CHW placed referral to [facility] today, 8/26/2024 and wait time is 5 to 6 months and pending. Referral also place to [facility] today, 6/26/2024.                            Care Plan: PT       Problem: Impaired mobility        Goal: Patient will attend PT appointment in the next 4 months.       Start Date: 8/26/2024 Expected End Date: 12/31/2024    This Visit's Progress: 30% Recent Progress: 20%    Note:     Barriers: language  Strengths: Willing to accept support  Patient expressed understanding of goal: Yes    Action steps to achieve this goal:  1. I will answer my phone when I am contacted to schedule my initial Physical Therapy appointment.  2. I will attend my initial Physical Therapy appointment on on 9/24/24 at 12:35pm. Attended.  3. I will follow up PT appointments as scheduled on 10/08/2024 at 1:50 PM and 10/22/2024 at 1:50 PM. Transportation arranged with [facility].   4. I will update CCC team at outreach.                               Care Plan: DME       Problem: impaired mobility       Goal: Patient would like to explore if she could qualify for a cane and incontinence supply in the next 90 days.       Start Date: 8/26/2024 Expected End Date: 12/31/2024    This Visit's Progress: 20%    Note:     Barriers: language   Strengths: Accepting of support  Patient expressed understanding of goal: Yes    Action steps to achieve this goal:  1. I will take hard copy to [facility] with support from my arm worker in the next 30 days.   2. I will follow up with CCC in the next month regarding this goal for additional coordination support.    Note: CCRN gave hard copy to patient on 8/26/24.                                    Plan/Recommendations: The patient will continue working with Care Coordination to achieve goal(s) as above. CHW will continue outreaches at minimum every 30 days and will involve Lead CC as needed or if patient is ready to move to Maintenance. Lead CC will continue to monitor CHW outreaches and patient's progress to goal(s) every 6 weeks.     Plan of Care updated and sent to patient: Yes, via mail

## 2024-10-07 NOTE — LETTER
[facility]  Patient Centered Plan of Care  About Me:        Patient Name:  Rafael Colin    YOB: 1970  Age:         54 year old   Maddi MRN:    [id number] Telephone Information:  Home Phone [phone]   Mobile [phone]       Address:  [address] [place] [address] Email address:  No e-mail address on record      Emergency Contact(s)    Name Relationship Lgl Grd Work Phone Home Phone Mobile Phone   1. RAFAEL,LAY Daughter    [phone]           Primary language:  Claudia     needed? Yes   [employer]:  [phone] op. 1  Other communication barriers:Language barrier; Lack of coping    Preferred Method of Communication:     Current living arrangement: I live in a private home with family    Mobility Status/ Medical Equipment: Independent w/Device        Health Maintenance  Health Maintenance Reviewed: Due/Overdue       My Access Plan  Medical Emergency 911   Primary Clinic Line [facility] [phone]   24 Hour Appointment Line [phone] or  [phone]) (toll-free)   24 Hour Nurse Line [phone] (toll-free)   Preferred Urgent Care [facility] [phone]     Preferred Hospital [facility]  [phone]     Preferred Pharmacy [facility] - [address]     Behavioral Health Crisis Line The National Suicide Prevention Lifeline at 1-446.829.6387 or Text/Call 808           My Care Team Members  Patient Care Team         Relationship Specialty Notifications Start End    Clinic - [facility] PCP - General   8/2/24     Phone: [phone] Fax: [phone]         [address] [place] [address]    Lona Longoria, RN Lead Care Coordinator Primary Care - [facility] Admissions 6/20/24     Phone: [phone]         Karsten Lewis, CHW Community Health Worker Primary Care - [facility] Admissions 6/20/24     Phone: [phone] Fax: [phone]          [address] [facility] [address] [facility] [address]    Hi Ramey MD Assigned Neuroscience Provider   7/23/24     Phone: [phone] Fax: [phone]         [address] [facility] [address]    Paco Us MD Assigned PCP   9/23/24     Phone: [phone] Fax: [phone]         [address] [facility] [address]                My Care Plans  Self Management and Treatment Plan    Care Plan  Care Plan: Establish care       Problem: est care       Goal: Patient would like to establish care with a new PCP in the next 6 months.       Start Date: 6/20/2024 Expected End Date: 12/31/2024    This Visit's Progress: 50% Recent Progress: 40%    Note:     Barriers: language barrier, low literacy, noncompliance, and lack of knowledge how to navigate complex health care system  Strengths: motivated to attend appt  Patient expressed understanding of goal: Yes    Action steps to achieve this goal:  1. I will answer my phone when I am contacted to schedule my appointment.  2. I will attend my establish care appointment as scheduled on 8/22/2024 at 1:40pm. No showed  3. I will attend my rescheduled est care appointment with Dr Roman on 11/28/2024 at 3:10 pm.   4. CCC RN will assist patient to reschedule establish care with a provider while in the clinic.  5. I will follow up with CCC regarding this goal at each outreach until it is completed.                             Care Plan: Neurology       Problem: CVA       Goal: Patient will attend her neurology appointment in the next 6 months.       Start Date: 6/20/2024 Expected End Date: 12/31/2024    This Visit's Progress: 40% Recent Progress: 30%    Note:     Barriers: language barrier, low literacy, noncompliance, and lack of knowledge how to navigate complex health care system  Strengths: motivated to attend appt  Patient expressed understanding of goal: Yes    Action steps to achieve this goal:  1. I will attend my neurology appointment as scheduled on 6/24/24 at  2:15pm. Completed.  2. I will attend my 4 months follow up appointment with neurologist as scheduled on 10/10/2024 at 2:15 PM. Transportation arranged with [facility] [employer].  3. I will follow up with CCC regarding this goal at each outreach until it is completed.                             Care Plan: PCA service       Problem: imparied mobility       Goal: Patient would like to explore if she could qualify for PCA service in the next 12 months.       Start Date: 7/16/2024 Expected End Date: 7/31/2025    This Visit's Progress: 30% Recent Progress: 20%    Note:     Barriers: language  Strengths: Willing to accept CCC support,   Patient expressed understanding of goal: Yes    Action steps to achieve this goal:  1. I will answer my phone when [facility]  calls to schedule an in-person assessment.   2. I will provide accurate information when needed.  3. I will update CCC team at outreach.     Notes: CHW placed referral to [facility] today, 8/26/2024 and wait time is 5 to 6 months and pending. Referral also place to [facility] today, 6/26/2024.                            Care Plan: PT       Problem: Impaired mobility       Goal: Patient will attend PT appointment in the next 4 months.       Start Date: 8/26/2024 Expected End Date: 12/31/2024    This Visit's Progress: 30% Recent Progress: 20%    Note:     Barriers: language  Strengths: Willing to accept support  Patient expressed understanding of goal: Yes    Action steps to achieve this goal:  1. I will answer my phone when I am contacted to schedule my initial Physical Therapy appointment.  2. I will attend my initial Physical Therapy appointment on on 9/24/24 at 12:35pm. Attended.  3. I will follow up PT appointments as scheduled on 10/08/2024 at 1:50 PM and 10/22/2024 at 1:50 PM. Transportation arranged with [facility].   4. I will update CCC team at outreach.                               Care Plan: DME       Problem: impaired mobility       Goal:  Patient would like to explore if she could qualify for a cane and incontinence supply in the next 90 days.       Start Date: 8/26/2024 Expected End Date: 12/31/2024    This Visit's Progress: 20%    Note:     Barriers: language   Strengths: Accepting of support  Patient expressed understanding of goal: Yes    Action steps to achieve this goal:  1. I will take hard copy to [facility] with support from my arm worker in the next 30 days.   2. I will follow up with CCC in the next month regarding this goal for additional coordination support.    Note: CCRN gave hard copy to patient on 8/26/24.                                 Action Plans on File:                       Advance Care Plans/Directives:   Advanced Care Plan/Directives on file:   No    Discussed with patient/caregiver(s):   Declined Further Information             My Medical and Care Information  Problem List   Patient Active Problem List   Diagnosis    Hyperlipidemia LDL goal <130    Neck Cervical Mass Left (___cm)    Allergic rhinitis    Vitamin D Deficiency    Obesity    Deafness in left ear    Lumbar back pain    Left-sided weakness    Multiple cerebral infarctions (H)    Intracranial atherosclerosis    H/O Rt UZIEL & MCA stroke    Prediabetes    Female stress incontinence    Localized swelling of left lower extremity    Acute left ankle pain      Current Medications and Allergies:  See printed Medication Report.    Care Coordination Start Date: 6/20/2024   Frequency of Care Coordination: monthly, more frequently as needed     Form Last Updated: 10/07/2024

## 2024-10-09 NOTE — PROGRESS NOTES
NEUROLOGY FOLLOW UP VISIT  NOTE       [facility] NEUROLOGY [place]  [address] [place] [address]  Tel: [phone]  Fax: [phone]  [email or web address]     Rafael ColinLIBRADO 1970, MRN [id number]  PCP: [facility]  Date: 10/10/2024      ASSESSMENT & PLAN     Visit Diagnosis  Intracranial atherosclerosis  History of stroke     H/O right UZIEL and MCA infarction  Intracranial atherosclerosis  54-year-old female with HTN, HLD, deafness in the left ear who was admitted to the hospital on 2024 with a right UZIEL and MCA infarction.  She was noted to have fairly advanced intracranial atherosclerotic disease and was started on dual antiplatelet therapy.  I have recommended:    1.  Continue aspirin and Plavix.  She will needed indefinitely  2.  Continue Lipitor 40 mg daily  3.  Check lipid panel and platelet function test  4.  Continue physical therapy  5.  Vascular risk factors modification: Healthy diet (fruits, vegetables, low fat dairy & reduced saturated fat), weight loss, exercise at least 30 minutes 5 days/week, BMI goal <25.  Keep systolic blood pressure goal <130.  LDL goal <70.  Hemoglobin A1c goal <7. If applicable, STOP smoking  6.  Follow-up in 1 year    Thank you again for this referral, please feel free to contact me if you have any questions.    Hi Ramey MD  [facility] NEUROLOGY, [place]     HISTORY OF PRESENT ILLNESS     Patient is a 54-year-old female with HTN, HLD, deafness in left ear who was admitted to the hospital on 2024 for right UZIEL and MCA infarction.  She had extensive intracranial atherosclerotic disease on CTA LDL was 194 and hemoglobin A1c was 5.8 suggesting prediabetes.  During her last visit she was told to continue on dual antiplatelet therapy with aspirin and Plavix indefinitely in addition to Lipitor 30-day event monitor showed no cardiac arrhythmia she feels there has been steady improvement in her strength.  She  is taking aspirin and Plavix and claims she is taking statin also.  She admits she has not made much changes in her diet    Briefly patient is female with history of HTN, HLD, deafness in left ear who was seen in the ER on 6/17/2024 for weakness to left arm and left leg and reported that her symptoms began in May with difficulty walking that progressed over several weeks but she delayed coming to the hospital.  CTA of the head and neck showed extensive intracranial atherosclerotic disease along with hypodensity in the UZIEL and MCA distribution.  MRI brain showed multifocal infarct of varying age in the right UZIEL and MCA territory with more infarct in the right frontal and parietal lobes.  Patchy enhancement suggested subacute infarct.  Echocardiogram showed a normal ejection fraction with LVH with no right-to-left shunt and no significant valvular heart abnormality.  30-day event monitor was normal.  Her LDL was 194 hemoglobin A1c 5.8 and a normal troponin.  Although patient carries a diagnosis of HTN she was not taking any medication     PROBLEM LIST   Patient Active Problem List   Diagnosis    Hyperlipidemia LDL goal <130    Neck Cervical Mass Left (___cm)    Allergic rhinitis    Vitamin D Deficiency    Obesity    Deafness in left ear    Lumbar back pain    Left-sided weakness    Multiple cerebral infarctions (H)    Intracranial atherosclerosis    H/O Rt UZIEL & MCA stroke    Prediabetes    Female stress incontinence    Localized swelling of left lower extremity    Acute left ankle pain         PAST MEDICAL & SURGICAL HISTORY     Past Medical History:   Patient  has no past medical history on file.    Surgical History:  She  has no past surgical history on file.     SOCIAL HISTORY     Reviewed, and she  reports that she has never smoked. She has never been exposed to tobacco smoke. She has never used smokeless tobacco. She reports that she does not drink alcohol and does not use drugs.     FAMILY HISTORY  "    Reviewed, and family history is not on file.     ALLERGIES     Allergies   Allergen Reactions    Shellfish-Derived Products Anaphylaxis    Amoxicillin Rash    Contrave [Naltrexone-Bupropion Hcl Er] Unknown    Sulfamethoxazole-Trimethoprim [Sulfamethoxazole-Trimethoprim] Itching and Rash         REVIEW OF SYSTEMS     A 12 point review of system was performed and was negative except as outlined in the history of present illness.     HOME MEDICATIONS     Current Outpatient Rx   Medication Sig Dispense Refill    aspirin (ASA) 81 MG chewable tablet Take 1 tablet (81 mg) by mouth daily 90 tablet 3    atorvastatin (LIPITOR) 80 MG tablet Take 1 tablet (80 mg) by mouth every evening 90 tablet 3    cetirizine (ZYRTEC) 10 MG tablet Take 1 tablet (10 mg) by mouth daily. 30 tablet 1    clopidogrel (PLAVIX) 75 MG tablet Take 1 tablet (75 mg) by mouth daily 90 tablet 3    losartan (COZAAR) 25 MG tablet Take 1 tablet (25 mg) by mouth daily 90 tablet 3         PHYSICAL EXAM     Vital signs  /79 (BP Location: Left arm, Patient Position: Sitting)   Pulse 60   Ht 1.422 m (4' 8\")   Wt 63 kg (139 lb)   LMP  (LMP Unknown)   BMI 31.16 kg/m      Weight:   139 lbs 0 oz    Alert and oriented x 3 no acute distress. Speech normal with no dysarthria or aphasia. She has deafness in the left ear motor strength on the right 5/5 on the left 5 -/5. Reflexes 1+ toes equivocal on the left downgoing on the right gait normal.     PERTINENT DIAGNOSTIC STUDIES     Following studies were reviewed:     CTA HEAD AND NECK 6/17/2024  HEAD CT:  1.  Small suspected infarct in the posterior right frontal lobe involving the precentral gyrus.  2.  No mass effect or hemorrhage.  3.  Chronic right frontal (UZIEL distribution) and right lentiform nucleus infarcts.     HEAD CTA:   1.  Extensive intracranial atherosclerosis with severe stenosis of the communicating segment of the right ICA and tandem severe stenosis at the carotid terminus, likely accounting " for the above described infarct.  2.  Right A3 occlusion corresponding to an area of chronic infarct.  3.  Additional multifocal severe stenoses and occlusions detailed above.     NECK CTA:  1.  No large vessel occlusion or hemodynamically significant stenosis.     MRI BRAIN 6/17/2024  1.  Multifocal infarcts of varying ages in the right UZIEL and MCA territories with more acute infarcts in the right frontal and parietal lobes. Patchy areas of enhancement likely reflect subacute infarcts, however, continued attention on follow-up is   recommended to document expected resolution.  2.  No acute intracranial hemorrhage.     ECHOCARDIOGRAM 6/18/2024  1.Left ventricular size, wall motion and function are normal. The ejection  fraction is 60-65%.  2.There is mild concentric left ventricular hypertrophy.  3.Normal right ventricle size and systolic function.  4.A contrast injection (Bubble Study) was performed that was negative for flow  across the interatrial septum.  5.No hemodynamically significant valvular abnormalities on 2D or color flow  imaging.  6.Mild Ascending Aorta dilatation is present, 40 mm.  There is no comparison study available.    30-DAY EVENT MONITOR 7/28/2024  Cardiac event monitoring from 7/28/2024 to 8/26/2024 (monitored duration 11d 13h 43m).  Baseline rhythm was sinus rhythm 60bpm with borderline IVCD.    Reported heart rate range 50 to 114bpm, average 66bpm.  No symptoms recorded.  No tachyarrhythmias.  No atrial fibrillation.  There were no pauses of over 3.0s.  Supraventricular and ventricular ectopic beat frequency are not reported on this monitoring modality.      PERTINENT LABS  Following labs were reviewed:  Office Visit on 08/05/2024   Component Date Value Ref Range Status    Sodium 08/05/2024 142  135 - 145 mmol/L Final    Potassium 08/05/2024 4.2  3.4 - 5.3 mmol/L Final    Carbon Dioxide (CO2) 08/05/2024 26  22 - 29 mmol/L Final    Anion Gap 08/05/2024 12  7 - 15 mmol/L Final    Urea  Nitrogen 08/05/2024 6.9  6.0 - 20.0 mg/dL Final    Creatinine 08/05/2024 0.57  0.51 - 0.95 mg/dL Final    GFR Estimate 08/05/2024 >90  >60 mL/min/1.73m2 Final    Calcium 08/05/2024 9.3  8.8 - 10.4 mg/dL Final    Chloride 08/05/2024 104  98 - 107 mmol/L Final    Glucose 08/05/2024 84  70 - 99 mg/dL Final    Alkaline Phosphatase 08/05/2024 80  40 - 150 U/L Final    AST 08/05/2024 34  0 - 45 U/L Final    ALT 08/05/2024 37  0 - 50 U/L Final    Protein Total 08/05/2024 8.0  6.4 - 8.3 g/dL Final    Albumin 08/05/2024 4.6  3.5 - 5.2 g/dL Final    Bilirubin Total 08/05/2024 0.3  <=1.2 mg/dL Final    Uric Acid 08/05/2024 5.6  2.4 - 5.7 mg/dL Final    Erythrocyte Sedimentation Rate 08/05/2024 22  0 - 30 mm/hr Final    CRP Inflammation 08/05/2024 <3.00  <5.00 mg/L Final    WBC Count 08/05/2024 3.8 (L)  4.0 - 11.0 10e3/uL Final    RBC Count 08/05/2024 4.54  3.80 - 5.20 10e6/uL Final    Hemoglobin 08/05/2024 13.3  11.7 - 15.7 g/dL Final    Hematocrit 08/05/2024 38.7  35.0 - 47.0 % Final    MCV 08/05/2024 85  78 - 100 fL Final    MCH 08/05/2024 29.3  26.5 - 33.0 pg Final    MCHC 08/05/2024 34.4  31.5 - 36.5 g/dL Final    RDW 08/05/2024 11.4  10.0 - 15.0 % Final    Platelet Count 08/05/2024 238  150 - 450 10e3/uL Final    % Neutrophils 08/05/2024 47  % Final    % Lymphocytes 08/05/2024 42  % Final    % Monocytes 08/05/2024 7  % Final    % Eosinophils 08/05/2024 4  % Final    % Basophils 08/05/2024 0  % Final    % Immature Granulocytes 08/05/2024 0  % Final    Absolute Neutrophils 08/05/2024 1.8  1.6 - 8.3 10e3/uL Final    Absolute Lymphocytes 08/05/2024 1.6  0.8 - 5.3 10e3/uL Final    Absolute Monocytes 08/05/2024 0.3  0.0 - 1.3 10e3/uL Final    Absolute Eosinophils 08/05/2024 0.2  0.0 - 0.7 10e3/uL Final    Absolute Basophils 08/05/2024 0.0  0.0 - 0.2 10e3/uL Final    Absolute Immature Granulocytes 08/05/2024 0.0  <=0.4 10e3/uL Final   Office Visit on 07/12/2024   Component Date Value Ref Range Status    SARS CoV2 PCR 07/12/2024  Negative  Negative Final         Total time spent for face to face visit, reviewing labs/imaging studies, counseling and coordination of care was: 30 Minutes spent on the date of the encounter doing chart review, review of outside records, review of test results, interpretation of tests, patient visit, and documentation     The longitudinal plan of care for the diagnosis(es)/condition(s) as documented were addressed during this visit. Due to the added complexity in care, I will continue to support Paw in the subsequent management and with ongoing continuity of care.    This note was dictated using voice recognition software.  Any grammatical or context distortions are unintentional and inherent to the software.    No orders of the defined types were placed in this encounter.     New Prescriptions    No medications on file     Modified Medications    No medications on file

## 2024-10-10 ENCOUNTER — OFFICE VISIT (OUTPATIENT)
Dept: NEUROLOGY | Facility: CLINIC | Age: 54
End: 2024-10-10
Payer: COMMERCIAL

## 2024-10-10 ENCOUNTER — TELEPHONE (OUTPATIENT)
Dept: FAMILY MEDICINE | Facility: CLINIC | Age: 54
End: 2024-10-10

## 2024-10-10 VITALS
HEART RATE: 60 BPM | HEIGHT: 56 IN | DIASTOLIC BLOOD PRESSURE: 79 MMHG | SYSTOLIC BLOOD PRESSURE: 132 MMHG | WEIGHT: 139 LBS | BODY MASS INDEX: 31.27 KG/M2

## 2024-10-10 DIAGNOSIS — I67.2 INTRACRANIAL ATHEROSCLEROSIS: Primary | ICD-10-CM

## 2024-10-10 DIAGNOSIS — Z86.73 HISTORY OF STROKE: ICD-10-CM

## 2024-10-10 PROCEDURE — 99214 OFFICE O/P EST MOD 30 MIN: CPT | Performed by: PSYCHIATRY & NEUROLOGY

## 2024-10-10 PROCEDURE — G2211 COMPLEX E/M VISIT ADD ON: HCPCS | Performed by: PSYCHIATRY & NEUROLOGY

## 2024-10-10 RX ORDER — CLOPIDOGREL BISULFATE 75 MG/1
75 TABLET ORAL DAILY
Qty: 90 TABLET | Refills: 3 | Status: SHIPPED | OUTPATIENT
Start: 2024-10-10

## 2024-10-10 RX ORDER — ASPIRIN 81 MG/1
81 TABLET, CHEWABLE ORAL DAILY
Qty: 90 TABLET | Refills: 3 | Status: SHIPPED | OUTPATIENT
Start: 2024-10-10

## 2024-10-10 RX ORDER — ATORVASTATIN CALCIUM 80 MG/1
80 TABLET, FILM COATED ORAL EVERY EVENING
Qty: 90 TABLET | Refills: 3 | Status: SHIPPED | OUTPATIENT
Start: 2024-10-10 | End: 2024-10-16

## 2024-10-10 NOTE — NURSING NOTE
Chief Complaint   Patient presents with    H/O right UZIEL and MCA infarction     4 month follow up- discuss event monitor and next steps      Pearl Cox CMA on 10/10/2024 at 2:37 PM  [facility]

## 2024-10-10 NOTE — TELEPHONE ENCOUNTER
----- Message from Pearl MCCONNELL sent at 10/10/2024  2:53 PM CDT -----  Hello,    This patient was seen by Dr. Ramey today who is ordering some lab work. We do not do labs here in  the clinic and patient prefers to go to PCP office. If someone could please call Paw and schedule a lab appt. She has been informed she needs to fast for 12 hours but please remind her. NPO for 12 hours except for medication with water    Thank you!  Pearl Cox, CMA on 10/10/2024 at 2:54 PM  [facility]

## 2024-10-16 ENCOUNTER — LAB (OUTPATIENT)
Dept: LAB | Facility: HOSPITAL | Age: 54
End: 2024-10-16
Payer: COMMERCIAL

## 2024-10-16 ENCOUNTER — TELEPHONE (OUTPATIENT)
Dept: NEUROLOGY | Facility: CLINIC | Age: 54
End: 2024-10-16

## 2024-10-16 DIAGNOSIS — I63.9 MULTIPLE CEREBRAL INFARCTIONS (H): ICD-10-CM

## 2024-10-16 DIAGNOSIS — I67.2 INTRACRANIAL ATHEROSCLEROSIS: ICD-10-CM

## 2024-10-16 DIAGNOSIS — Z86.73 HISTORY OF STROKE: ICD-10-CM

## 2024-10-16 DIAGNOSIS — E78.5 HYPERLIPIDEMIA LDL GOAL <130: Primary | ICD-10-CM

## 2024-10-16 LAB
CHOLEST SERPL-MCNC: 187 MG/DL
FASTING STATUS PATIENT QL REPORTED: YES
HDLC SERPL-MCNC: 50 MG/DL
LDLC SERPL CALC-MCNC: 104 MG/DL
NONHDLC SERPL-MCNC: 137 MG/DL
PA ADP BLD-ACNC: 116 PRU
TRIGL SERPL-MCNC: 165 MG/DL

## 2024-10-16 PROCEDURE — 80061 LIPID PANEL: CPT

## 2024-10-16 PROCEDURE — 36415 COLL VENOUS BLD VENIPUNCTURE: CPT

## 2024-10-16 PROCEDURE — 85576 BLOOD PLATELET AGGREGATION: CPT

## 2024-10-16 RX ORDER — ATORVASTATIN CALCIUM 80 MG/1
80 TABLET, FILM COATED ORAL EVERY EVENING
Qty: 90 TABLET | Refills: 3 | Status: SHIPPED | OUTPATIENT
Start: 2024-10-16

## 2024-10-16 NOTE — RESULT ENCOUNTER NOTE
LDL is elevated. Target LDL is less than 70. Increase Lipitor to 80 mg daily. New prescription sent to pharmacy

## 2024-10-16 NOTE — TELEPHONE ENCOUNTER
----- Message from Hi Ramey sent at 10/16/2024  2:59 PM CDT -----  LDL is elevated. Target LDL is less than 70. Increase Lipitor to 80 mg daily. New prescription sent to pharmacy

## 2024-10-17 NOTE — TELEPHONE ENCOUNTER
"Spoke with patient via  services and relayed results  Per Dr. Ramey, LDL elevated and should increase Lipitor to 80mg daily. This is the dose the patient has been on. The last OV says \"Continue Lipitor 40 mg daily\" but per chart review, she has been filling 80mg and 6/24/24 OV says \"Continue Lipitor 80 mg daily with goal of LDL less than 70.\"    Instructed patient to not make any changes at this time until we can clarify with Dr. Ramey. She is aware he is out of office.       "

## 2024-10-21 ENCOUNTER — PATIENT OUTREACH (OUTPATIENT)
Dept: CARE COORDINATION | Facility: CLINIC | Age: 54
End: 2024-10-21
Payer: COMMERCIAL

## 2024-10-21 DIAGNOSIS — Z71.89 OTHER SPECIFIED COUNSELING: Primary | Chronic | ICD-10-CM

## 2024-10-21 NOTE — TELEPHONE ENCOUNTER
Hi Ramey MD  You4 days ago       Continue current dose of Lipitor and a repeat a lipid panel after 3 months.

## 2024-10-21 NOTE — PROGRESS NOTES
Clinic Care Coordination Contact  Community Health Worker Follow Up    Care Gaps:   Health Maintenance Due   Topic Date Due    ADVANCE CARE PLANNING  Never done    COLORECTAL CANCER SCREENING  Never done    YEARLY PREVENTIVE VISIT  05/31/2020    ZOSTER IMMUNIZATION (1 of 2) Never done    HPV TEST  05/31/2024    PAP  05/31/2024    INFLUENZA VACCINE (1) 09/01/2024    COVID-19 Vaccine (4 - 2024-25 season) 09/01/2024     Scheduled 1/07/2024 for preventive care visit.      Care Plan:   Care Plan: Establish care       Problem: est care       Goal: Patient would like to establish care with a new PCP in the next 6 months.       Start Date: 6/20/2024 Expected End Date: 12/31/2024    This Visit's Progress: 60% Recent Progress: 50%    Note:     Barriers: language barrier, low literacy, noncompliance, and lack of knowledge how to navigate complex health care system  Strengths: motivated to attend appt  Patient expressed understanding of goal: Yes    Action steps to achieve this goal:  1. I will answer my phone when I am contacted to schedule my appointment.  2. I will attend my establish care appointment as scheduled on 8/22/2024 at 1:40pm. No showed  3. I will attend my rescheduled est care appointment with Dr Roman on 11/18/2024 at 3:10 pm. Updated.  4. CCC RN will assist patient to reschedule establish care with a provider while in the clinic.  5. I will follow up with CCC regarding this goal at each outreach until it is completed.                             Care Plan: Neurology       Problem: CVA       Goal: Patient will attend her neurology appointment in the next 6 months.       Start Date: 6/20/2024 Expected End Date: 12/31/2024    This Visit's Progress: 50% Recent Progress: 40%    Note:     Barriers: language barrier, low literacy, noncompliance, and lack of knowledge how to navigate complex health care system  Strengths: motivated to attend appt  Patient expressed understanding of goal: Yes    Action steps to achieve  this goal:  1. I will attend my neurology appointment as scheduled on 6/24/24 at 2:15pm. Completed.  2. I will attend my 4 months follow up appointment with neurologist as scheduled on 10/10/2024 at 2:15 PM. Completed.  3. I will attend my follow up appointment in one year, 10/20/2025 at 1:45 PM.                             Care Plan: PCA service       Problem: imparied mobility       Goal: Patient would like to explore if she could qualify for PCA service in the next 12 months.       Start Date: 7/16/2024 Expected End Date: 7/31/2025    This Visit's Progress: 40% Recent Progress: 30%    Note:     Barriers: language  Strengths: Willing to accept CCC support,   Patient expressed understanding of goal: Yes    Action steps to achieve this goal:  1. I will answer my phone when [facility]  calls to schedule an in-person assessment.   2. I will provide accurate information when needed.  3. I will update CCC team at outreach.     Notes: CHW placed referral to [facility] today, 8/26/2024 and wait time is 5 to 6 months and pending. Referral also place to [facility] today, 6/26/2024.                            Care Plan: PT       Problem: Impaired mobility       Goal: Patient will attend PT appointment in the next 4 months.       Start Date: 8/26/2024 Expected End Date: 12/31/2024    This Visit's Progress: 40% Recent Progress: 30%    Note:     Barriers: language  Strengths: Willing to accept support  Patient expressed understanding of goal: Yes    Action steps to achieve this goal:  1. I will answer my phone when I am contacted to schedule my initial Physical Therapy appointment.  2. I will attend my initial Physical Therapy appointment on on 9/24/24 at 12:35pm. Attended.  3. I will follow up PT appointments as scheduled on 10/08/2024 at 1:50 PM  was no showed. Next appointment is on 10/22/2024 at 1:50 PM. Transportation arranged with [employer].   4. I will update CCC team at outreach.                              Care Plan: DME       Problem: impaired mobility       Goal: Patient would like to explore if she could qualify for a cane and incontinence supply in the next 90 days.       Start Date: 8/26/2024 Expected End Date: 12/31/2024    This Visit's Progress: 30% Recent Progress: 20%    Note:     Barriers: language   Strengths: Accepting of support  Patient expressed understanding of goal: Yes    Action steps to achieve this goal:  1. I will take hard copy to [facility] with support from my armhs worker in the next 30 days.   2. I will follow up with CCC in the next month regarding this goal for additional coordination support.    Note: CCRN gave hard copy to patient on 8/26/24. Per patient that her ARMHS who took the hard copy and has no heard back. CHW attempted to call at [phone] and left a voice message to call back.                          Intervention and Education during outreach:  -CHW reminded patient of upcoming appointment and transportation arranged as appropriated.  -Per patient, she has an ARM worker but couldn't reach and CHW attempted to call and left a voice message that patient provided contact [phone].   -CHW completed [facility] referral today [place]. Per patient, she submitted everything but it is still pending.   -Patient was informed to call with questions or concerns.     CHW Next Outreach: In one month.

## 2024-10-22 ENCOUNTER — THERAPY VISIT (OUTPATIENT)
Dept: PHYSICAL THERAPY | Facility: REHABILITATION | Age: 54
End: 2024-10-22
Attending: FAMILY MEDICINE
Payer: COMMERCIAL

## 2024-10-22 DIAGNOSIS — M25.572 ACUTE LEFT ANKLE PAIN: ICD-10-CM

## 2024-10-22 DIAGNOSIS — Z86.73 HISTORY OF STROKE: ICD-10-CM

## 2024-10-22 DIAGNOSIS — R53.1 LEFT-SIDED WEAKNESS: Primary | ICD-10-CM

## 2024-10-22 PROCEDURE — 97110 THERAPEUTIC EXERCISES: CPT | Mod: GP | Performed by: PHYSICAL THERAPIST

## 2024-10-22 NOTE — TELEPHONE ENCOUNTER
"Patient informed via  services no changes to medication at this time and will need to repeat lipid panel in 3 months (fasting lab- NPO for 12 hours except for medication with water).     Patient also reports she is experiencing \"wooshing\" sound in her ear as well as constipation- having a BM every other day and is concerned about that. She does have a follow up with PCP 11/18/24 but patient was informed we would send a message to her PCP care team for awareness.     She also is looking for help with filling out a form from [facility]: medical conditions and scheduled appts.     Pearl Cox CMA on 10/22/2024 at 9:19 AM  [facility]      "

## 2024-10-24 ENCOUNTER — PATIENT OUTREACH (OUTPATIENT)
Dept: CARE COORDINATION | Facility: CLINIC | Age: 54
End: 2024-10-24
Payer: COMMERCIAL

## 2024-10-31 ENCOUNTER — OFFICE VISIT (OUTPATIENT)
Dept: FAMILY MEDICINE | Facility: CLINIC | Age: 54
End: 2024-10-31
Payer: COMMERCIAL

## 2024-10-31 ENCOUNTER — VIRTUAL VISIT (OUTPATIENT)
Dept: INTERPRETER SERVICES | Facility: CLINIC | Age: 54
End: 2024-10-31

## 2024-10-31 VITALS
SYSTOLIC BLOOD PRESSURE: 122 MMHG | TEMPERATURE: 98.7 F | BODY MASS INDEX: 32.1 KG/M2 | HEIGHT: 56 IN | HEART RATE: 69 BPM | WEIGHT: 142.7 LBS | RESPIRATION RATE: 16 BRPM | OXYGEN SATURATION: 99 % | DIASTOLIC BLOOD PRESSURE: 78 MMHG

## 2024-10-31 DIAGNOSIS — Z78.9 NEED FOR FOLLOW-UP BY SOCIAL WORKER: ICD-10-CM

## 2024-10-31 DIAGNOSIS — M79.672 LEFT FOOT PAIN: Primary | ICD-10-CM

## 2024-10-31 DIAGNOSIS — H93.11 TINNITUS, RIGHT: ICD-10-CM

## 2024-10-31 DIAGNOSIS — R05.9 COUGH, UNSPECIFIED TYPE: ICD-10-CM

## 2024-10-31 DIAGNOSIS — J30.89 NON-SEASONAL ALLERGIC RHINITIS, UNSPECIFIED TRIGGER: ICD-10-CM

## 2024-10-31 PROCEDURE — 90471 IMMUNIZATION ADMIN: CPT | Performed by: FAMILY MEDICINE

## 2024-10-31 PROCEDURE — 90673 RIV3 VACCINE NO PRESERV IM: CPT | Performed by: FAMILY MEDICINE

## 2024-10-31 PROCEDURE — 99214 OFFICE O/P EST MOD 30 MIN: CPT | Mod: 25 | Performed by: FAMILY MEDICINE

## 2024-10-31 PROCEDURE — T1013 SIGN LANG/ORAL INTERPRETER: HCPCS | Mod: U4

## 2024-10-31 PROCEDURE — 90480 ADMN SARSCOV2 VAC 1/ONLY CMP: CPT | Performed by: FAMILY MEDICINE

## 2024-10-31 PROCEDURE — 91320 SARSCV2 VAC 30MCG TRS-SUC IM: CPT | Performed by: FAMILY MEDICINE

## 2024-10-31 RX ORDER — CETIRIZINE HYDROCHLORIDE 10 MG/1
10 TABLET ORAL DAILY
Qty: 30 TABLET | Refills: 1 | Status: SHIPPED | OUTPATIENT
Start: 2024-10-31

## 2024-10-31 NOTE — PROGRESS NOTES
"  Assessment & Plan     Non-seasonal allergic rhinitis, unspecified trigger  Requests refill  - cetirizine (ZYRTEC) 10 MG tablet  Dispense: 30 tablet; Refill: 1    Need for follow-up by   Needs rides to PT - does not drive.   - Primary Care - Care Coordination Referral    Left foot pain, ankle pain  Somewhat difficult to get a clear history. Chronic since stroke, has been seen for this and referred to PT, but has had difficulty arranging appts and transportation. Referral to podiatry.   - Orthopedic  Referral    Tinnitus, right, chronic  Left ear deaf per patient  Recommend hearing test and referral to ENT  - Adult ENT  Referral  - Adult Audiology  Referral    30 minutes spent on day of encounter on chart review, patient interview with , exam, discussing plan, placing orders and documentation.       BMI  Estimated body mass index is 31.99 kg/m  as calculated from the following:    Height as of this encounter: 1.422 m (4' 8\").    Weight as of this encounter: 64.7 kg (142 lb 11.2 oz).           Sourav Hall is a 54 year old, presenting for the following health issues:  Musculoskeletal Problem      10/31/2024    11:14 AM   Additional Questions   Roomed by vonda williamson   Accompanied by Self     Sole of left foot - stinging pain. Also stinging pain in left ankle.     Stinging pain behind left ear where she had surgery, cannot sleep    PT referral for foot/ankle pain - torito 9/24 certified for 1x/week for 12 weeks, but no further visits. Trouble scheduling - chronic hearing loss, reports deaf in left ear - when she gets phone call, cannot hear    Needs note for PCA to  meds from pharmacy - ran out yesterday - has all empty bottles. Does not drive.     Right ear making \"air\" noises - chronic. When asked to describe, she makes tinnitus sound. Deaf in left ear.                   Objective    /78   Pulse 69   Temp 98.7  F (37.1  C) (Oral)   Resp 16   Ht 1.422 " "m (4' 8\")   Wt 64.7 kg (142 lb 11.2 oz)   LMP  (LMP Unknown)   SpO2 99%   BMI 31.99 kg/m    Body mass index is 31.99 kg/m .  Physical Exam     Gen: NAD, well appearing  MSK: left foot and ankle without swelling, deformity, or erythema. No pain with palpation over ankle and plantar aspect of foot. Indicates pain is deep in ball of foot, but unable to reproduce with palpation. No callous or overlying skin change in are of MTP joints on plantar foot  Skin: normal skin of the foot, no lesions or wounds  CV: No peripheral edema. Feet are warm and appear well perfused. Normal pedal pulses.           Signed Electronically by: Fina Muñoz MD    "

## 2024-10-31 NOTE — LETTER
October 31, 2024      Rafael Colin  [address]  [place] [address]        To Whom It May Concern,     Rafael Cristi needs PCA assistance to request medication refills and  her prescriptions from the pharmacy on a monthly basis.       Sincerely,        Fina Muñoz MD

## 2024-11-04 ENCOUNTER — PATIENT OUTREACH (OUTPATIENT)
Dept: CARE COORDINATION | Facility: CLINIC | Age: 54
End: 2024-11-04
Payer: COMMERCIAL

## 2024-11-04 NOTE — PROGRESS NOTES
"Clinic Care Coordination Contact  Care Coordination Clinician Chart Review    Situation: Patient chart reviewed by Care Coordinator.       Background: Care Coordination Program started: 6/20/2024. Initial assessment completed and patient-centered care plan(s) were developed with participation from patient. Lead CC handed patient off to CHW for continued outreaches.     Assessment: Per chart review, patient outreach completed by CC CHW on 10/21/24.  Patient is actively working to accomplish goal(s). Patient's goal(s) appropriate and relevant at this time. Patient is not due for updated Plan of Care.  Assessments will be completed annually or as needed/with change of patient status.    New CC referral - [facility]  Received new referral from Dr Muñoz on 10/31/24 for \" snap benefits paperwork - neds help filling out. Per chart review, patient is actively working with FRW.  Message sent to DORENE Salomon today.     Neurology  Per chart review, patient attended neurology appointment on 10/10/24 and to return in 1 yr which is scheduled on 10/20/25 at 1:45 pm with Dr Ramey. Goal completed today.     PCA service  Per chart review, PCA assessment still pending and on wait list. Goal up to date.     PT  Per chart review, patient no showed on 10/8 but attended PT appt on 10/22. CHW to assist patient schedule follow up PT appts. Goal updated.     Cane and incontinence supply  CHW to follow up on this. Goal up to date.       Care Plan: Establish care       Problem: est care       Goal: Patient would like to establish care with a new PCP in the next 6 months.       Start Date: 6/20/2024 Expected End Date: 12/31/2024    This Visit's Progress: 60% Recent Progress: 50%    Note:     Barriers: language barrier, low literacy, noncompliance, and lack of knowledge how to navigate complex health care system  Strengths: motivated to attend appt  Patient expressed understanding of goal: Yes    Action steps to achieve this " goal:  1. I will answer my phone when I am contacted to schedule my appointment.  2. I will attend my establish care appointment as scheduled on 8/22/2024 at 1:40pm. No showed  3. I will attend my rescheduled est care appointment with Dr Roman on 11/18/2024 at 3:10 pm. Updated.  4. [facility] RN will assist patient to reschedule establish care with a provider while in the clinic.  5. I will follow up with [facility] regarding this goal at each outreach until it is completed.                             Care Plan: Neurology Completed 11/4/2024      Problem: CVA  Resolved 11/4/2024      Goal: Patient will attend her neurology appointment in the next 6 months.  Completed 11/4/2024      Start Date: 6/20/2024 Expected End Date: 12/31/2024    This Visit's Progress: 100% Recent Progress: 50%    Note:     Barriers: language barrier, low literacy, noncompliance, and lack of knowledge how to navigate complex health care system  Strengths: motivated to attend appt  Patient expressed understanding of goal: Yes    Action steps to achieve this goal:  1. I will attend my neurology appointment as scheduled on 6/24/24 at 2:15pm. Completed.  2. I will attend my 4 months follow up appointment with neurologist as scheduled on 10/10/2024 at 2:15 PM. Completed.  3. I will attend my follow up appointment in one year, 10/20/2025 at 1:45 PM.                             Care Plan: PCA service       Problem: imparied mobility       Goal: Patient would like to explore if she could qualify for PCA service in the next 12 months.       Start Date: 7/16/2024 Expected End Date: 7/31/2025    This Visit's Progress: 40% Recent Progress: 30%    Note:     Barriers: language  Strengths: Willing to accept CCC support,   Patient expressed understanding of goal: Yes    Action steps to achieve this goal:  1. I will answer my phone when [facility]  calls to schedule an in-person assessment.   2. I will provide accurate information when needed.  3. I will update CCC  team at [facility].     Notes: CHW placed referral to [facility] today, 8/26/2024 and wait time is 5 to 6 months and pending. Referral also place to [facility] today, 6/26/2024.                            Care Plan: PT       Problem: Impaired mobility       Goal: Patient will attend PT appointment in the next 4 months.       Start Date: 8/26/2024 Expected End Date: 12/31/2024    Recent Progress: 40%    Note:     Barriers: language  Strengths: Willing to accept support  Patient expressed understanding of goal: Yes    Action steps to achieve this goal:  1. I will answer my phone when I am contacted to schedule my initial Physical Therapy appointment.  2. I will attend my initial Physical Therapy appointment on on 9/24/24 at 12:35pm. Attended.  3. I will follow up PT appointments as scheduled on 10/08/2024 at 1:50 PM  was no showed.   4. I will attend my follow up PT appointment on 10/22/24. No showed. Transportation arranged with [facility].   5. I will update CCC team at [facility].                             Care Plan: DME       Problem: impaired mobility       Goal: Patient would like to explore if she could qualify for a cane and incontinence supply in the next 90 days.       Start Date: 8/26/2024 Expected End Date: 12/31/2024    This Visit's Progress: 30% Recent Progress: 20%    Note:     Barriers: language   Strengths: Accepting of support  Patient expressed understanding of goal: Yes    Action steps to achieve this goal:  1. I will take hard copy to [facility] with support from my arm worker in the next 30 days.   2. I will follow up with CCC in the next month regarding this goal for additional coordination support.    Note: CCRN gave hard copy to patient on 8/26/24. Per patient that her ARMHS who took the hard copy and has no heard back. CHW attempted to call at [phone] and left a voice message to call back.                                 Plan/Recommendations: The patient will  continue working with Care Coordination to achieve goal(s) as above. CHW will continue outreaches at minimum every 30 days and will involve Lead CC as needed or if patient is ready to move to Maintenance. Lead CC will continue to monitor CHW outreaches and patient's progress to goal(s) every 6 weeks.     Plan of Care updated and sent to patient: No

## 2024-11-18 ENCOUNTER — OFFICE VISIT (OUTPATIENT)
Dept: FAMILY MEDICINE | Facility: CLINIC | Age: 54
End: 2024-11-18
Payer: COMMERCIAL

## 2024-11-18 VITALS
TEMPERATURE: 99 F | DIASTOLIC BLOOD PRESSURE: 70 MMHG | RESPIRATION RATE: 16 BRPM | BODY MASS INDEX: 32.06 KG/M2 | WEIGHT: 142.5 LBS | SYSTOLIC BLOOD PRESSURE: 118 MMHG | OXYGEN SATURATION: 99 % | HEART RATE: 68 BPM | HEIGHT: 56 IN

## 2024-11-18 DIAGNOSIS — Z86.73 HISTORY OF STROKE: ICD-10-CM

## 2024-11-18 DIAGNOSIS — R10.13 EPIGASTRIC PAIN: ICD-10-CM

## 2024-11-18 DIAGNOSIS — R20.9 ALTERATION OF SENSATIONS, POST-STROKE: ICD-10-CM

## 2024-11-18 DIAGNOSIS — Z12.11 SCREEN FOR COLON CANCER: ICD-10-CM

## 2024-11-18 DIAGNOSIS — N39.3 FEMALE STRESS INCONTINENCE: ICD-10-CM

## 2024-11-18 DIAGNOSIS — E78.5 HYPERLIPIDEMIA LDL GOAL <130: ICD-10-CM

## 2024-11-18 DIAGNOSIS — Z76.89 ENCOUNTER TO ESTABLISH CARE: Primary | ICD-10-CM

## 2024-11-18 DIAGNOSIS — I69.398 ALTERATION OF SENSATIONS, POST-STROKE: ICD-10-CM

## 2024-11-18 DIAGNOSIS — I63.9 MULTIPLE CEREBRAL INFARCTIONS (H): ICD-10-CM

## 2024-11-18 PROBLEM — M25.572 ACUTE LEFT ANKLE PAIN: Status: RESOLVED | Noted: 2024-09-24 | Resolved: 2024-11-18

## 2024-11-18 PROCEDURE — 80061 LIPID PANEL: CPT | Performed by: FAMILY MEDICINE

## 2024-11-18 PROCEDURE — 36415 COLL VENOUS BLD VENIPUNCTURE: CPT | Performed by: FAMILY MEDICINE

## 2024-11-18 NOTE — LETTER
November 19, 2024      Paw Bi  [address]  [place] [address]        Dear ,    We are writing to inform you of your test results.    Cholesterol is significantly elevated that increases her risk of having another stroke or heart attack.  Please increase the dose of atorvastatin to 80 mg daily.  I have sent a new prescription to your pharmacy.  Also eat Mediterranean diet    Resulted Orders   Lipid Profile   Result Value Ref Range    Cholesterol 231 (H) <200 mg/dL    Triglycerides 160 (H) <150 mg/dL    Direct Measure HDL 42 (L) >=50 mg/dL    LDL Cholesterol Calculated 157 (H) <100 mg/dL    Non HDL Cholesterol 189 (H) <130 mg/dL    Patient Fasting > 8hrs? Unknown     Narrative    Cholesterol  Desirable: < 200 mg/dL  Borderline High: 200 - 239 mg/dL  High: >= 240 mg/dL    Triglycerides  Normal: < 150 mg/dL  Borderline High: 150 - 199 mg/dL  High: 200-499 mg/dL  Very High: >= 500 mg/dL    Direct Measure HDL  Female: >= 50 mg/dL   Male: >= 40 mg/dL    LDL Cholesterol  Desirable: < 100 mg/dL  Above Desirable: 100 - 129 mg/dL   Borderline High: 130 - 159 mg/dL   High:  160 - 189 mg/dL   Very High: >= 190 mg/dL    Non HDL Cholesterol  Desirable: < 130 mg/dL  Above Desirable: 130 - 159 mg/dL  Borderline High: 160 - 189 mg/dL  High: 190 - 219 mg/dL  Very High: >= 220 mg/dL       If you have any questions or concerns, please call the clinic at the number listed above.       Sincerely,      Hi Ramey MD
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
## 2024-11-19 PROBLEM — R22.42 LOCALIZED SWELLING OF LEFT LOWER EXTREMITY: Status: RESOLVED | Noted: 2024-08-27 | Resolved: 2024-11-19

## 2024-11-19 LAB
CHOLEST SERPL-MCNC: 231 MG/DL
FASTING STATUS PATIENT QL REPORTED: ABNORMAL
HDLC SERPL-MCNC: 42 MG/DL
LDLC SERPL CALC-MCNC: 157 MG/DL
NONHDLC SERPL-MCNC: 189 MG/DL
TRIGL SERPL-MCNC: 160 MG/DL

## 2024-11-19 RX ORDER — GABAPENTIN 100 MG/1
100 CAPSULE ORAL AT BEDTIME
Qty: 30 CAPSULE | Refills: 3 | Status: SHIPPED | OUTPATIENT
Start: 2024-11-19

## 2024-11-21 ENCOUNTER — PATIENT OUTREACH (OUTPATIENT)
Dept: CARE COORDINATION | Facility: CLINIC | Age: 54
End: 2024-11-21
Payer: COMMERCIAL

## 2024-11-21 NOTE — PROGRESS NOTES
Clinic Care Coordination Contact  Community Health Worker Follow Up    Care Gaps:   Health Maintenance Due   Topic Date Due    ADVANCE CARE PLANNING  Never done    COLORECTAL CANCER SCREENING  Never done    YEARLY PREVENTIVE VISIT  05/31/2020    ZOSTER IMMUNIZATION (1 of 2) Never done    HPV TEST  05/31/2024    PAP  05/31/2024     Scheduled on 1/07/2025 for preventive care visit.      Care Plan:   Care Plan: Establish care       Problem: est care       Goal: Patient would like to establish care with a new PCP in the next 6 months.  Completed 11/21/2024      Start Date: 6/20/2024 Expected End Date: 12/31/2024    This Visit's Progress: 100% Recent Progress: 60%    Note:     Barriers: language barrier, low literacy, noncompliance, and lack of knowledge how to navigate complex health care system  Strengths: motivated to attend appt  Patient expressed understanding of goal: Yes    Action steps to achieve this goal:  1. I will answer my phone when I am contacted to schedule my appointment.  2. I will attend my establish care appointment as scheduled on 8/22/2024 at 1:40pm. No showed  3. I will attend my rescheduled est care appointment with Dr Roman on 11/18/2024 at 3:10 pm. Completed.  4. CCC RN will assist patient to reschedule establish care with a provider while in the clinic.  5. I will follow up with CCC regarding this goal at each outreach until it is completed.                             Care Plan: Neurology Completed 11/4/2024      Problem: CVA  Resolved 11/4/2024      Goal: Patient will attend her neurology appointment in the next 6 months.  Completed 11/4/2024      Start Date: 6/20/2024 Expected End Date: 12/31/2024    This Visit's Progress: 100% Recent Progress: 50%    Note:     Barriers: language barrier, low literacy, noncompliance, and lack of knowledge how to navigate complex health care system  Strengths: motivated to attend appt  Patient expressed understanding of goal: Yes    Action steps to achieve  this goal:  1. I will attend my neurology appointment as scheduled on 6/24/24 at 2:15pm. Completed.  2. I will attend my 4 months follow up appointment with neurologist as scheduled on 10/10/2024 at 2:15 PM. Completed.  3. I will attend my follow up appointment in one year, 10/20/2025 at 1:45 PM.                             Care Plan: PCA service       Problem: imparied mobility       Goal: Patient would like to explore if she could qualify for PCA service in the next 12 months.       Start Date: 7/16/2024 Expected End Date: 7/31/2025    This Visit's Progress: 50% Recent Progress: 40%    Note:     Barriers: language  Strengths: Willing to accept CCC support,   Patient expressed understanding of goal: Yes    Action steps to achieve this goal:  1. I will answer my phone when [facility]  calls to schedule an in-person assessment.   2. I will provide accurate information when needed.  3. I will update CCC team at outreach.     Notes: CHW placed referral to [facility] today, 8/26/2024 and wait time is another 2 to 3 more months and pending. Referral also place to [facility] today, 6/26/2024 and ARM worker is helping to follow up.                            Care Plan: PT       Problem: Impaired mobility       Goal: Patient will attend PT appointment in the next 4 months.  Completed 11/21/2024      Start Date: 8/26/2024 Expected End Date: 12/31/2024    This Visit's Progress: 100% Recent Progress: 40%    Note:     Barriers: language  Strengths: Willing to accept support  Patient expressed understanding of goal: Yes    Action steps to achieve this goal:  1. I will answer my phone when I am contacted to schedule my initial Physical Therapy appointment.  2. I will attend my initial Physical Therapy appointment on on 9/24/24 at 12:35pm. Attended.  3. I will follow up PT appointments as scheduled on 10/08/2024 at 1:50 PM  was no showed.   4. I will attend my follow up PT appointment on 10/22/24. Completed.    5. I will update CCC team at outreach.     Note: Patient is no longer interested in attending due to cold weather and not helpful.                            Care Plan: DME       Problem: impaired mobility       Goal: Patient would like to explore if she could qualify for a cane and incontinence supply in the next 90 days.       Start Date: 8/26/2024 Expected End Date: 12/31/2024    This Visit's Progress: 40% Recent Progress: 30%    Note:     Barriers: language   Strengths: Accepting of support  Patient expressed understanding of goal: Yes    Action steps to achieve this goal:  1. I will take hard copy to [facility] with support from my armhs worker in the next 30 days. Mis  and will deliver it to patient.  2. I will follow up with CCC in the next month regarding this goal for additional coordination support.    Note: CHW sent message to PCP and requesting new order for pull-ups.                           Intervention and Education during outreach:  -CHW was able to obtain patient's new [facility] worker and added to patient's care team after many phone call around because patient couldn't provide accurate information.  -Mis stated she already picked up the care but forgot to deliver it to patient and she will deliver it this week or next week.  -Pull-ups: [facility] will need new order as previous order indicate diaper and PCP will need to include second diagnoses indicating what is leading for patient to use pull-ups medically and this info has been sent to PCP.  -Patient is no longer interested in continuing PT and stated it's not helpful and outside is getting cold.  -Patient's PCA referral and [facility] referral are pending and [facility] worker is helping to follow up on the status.   -Patient was informed to call with questions or concerns.     CHW Next Outreach: In one month.

## 2024-11-21 NOTE — PROGRESS NOTES
Hi Dr. Roman, please place new order for specifically for pull-up. Dr. Us placed order on 8/27/2024 will need justification if patient wants pull-ups and to avoid delay, new order is easier. Also, MA is now required second diagnoses to medically support why this is leading for patient required to use incontinence. Pleas indicate qty per month as well.       Thanks,  Karsten

## 2024-11-25 ENCOUNTER — OFFICE VISIT (OUTPATIENT)
Dept: PODIATRY | Facility: CLINIC | Age: 54
End: 2024-11-25
Attending: FAMILY MEDICINE
Payer: COMMERCIAL

## 2024-11-25 VITALS — OXYGEN SATURATION: 98 % | WEIGHT: 142 LBS | BODY MASS INDEX: 31.94 KG/M2 | HEART RATE: 65 BPM

## 2024-11-25 DIAGNOSIS — M24.573 EQUINUS CONTRACTURE OF ANKLE: ICD-10-CM

## 2024-11-25 DIAGNOSIS — M72.2 PLANTAR FASCIITIS OF LEFT FOOT: Primary | ICD-10-CM

## 2024-11-25 PROCEDURE — 99203 OFFICE O/P NEW LOW 30 MIN: CPT | Performed by: PODIATRIST

## 2024-11-25 RX ORDER — NAPROXEN 500 MG/1
500 TABLET ORAL 2 TIMES DAILY WITH MEALS
Qty: 28 TABLET | Refills: 0 | Status: SHIPPED | OUTPATIENT
Start: 2024-11-25

## 2024-11-25 NOTE — LETTER
11/25/2024      Rafael Colin  [address] [place] [address]      Dear Colleague,    Thank you for referring your patient, Rafael Colin, to the [facility] [place]. Please see a copy of my visit note below.    FOOT AND ANKLE SURGERY/PODIATRY Progress Note        ASSESSMENT:   Plantar Fasciitis left  Gastrosoleus Equinus       TREATMENT:  -Patient has pain along the plantar left arch along the course of the plantar fascia consistent with plantar fasciitis.     -We discussed treatment options to include stretching exercises, anti-inflammatory medication, orthotics, steroid injections, physical therapy and a night splint.     -All questions invited and answered. I will start her on Naproxen and have referred her to [facility] for custom orthotics.    -I recommend she follow-up with her primary care provider to discuss left ear pain    -I have asked her to follow-up after using the orthotics x3-4 weeks if symptoms continue.     Herson Morales, EVERETT  [facility] Podiatry/Foot & Ankle Surgery      HPI: I was asked to see Rafael Colin today for left arch pain.  Patient reports she has had pain in the left arch for several weeks.  Denies trauma.  She also complains of left ear pain.    History reviewed. No pertinent past medical history.    History reviewed. No pertinent surgical history.    Allergies   Allergen Reactions     Shellfish-Derived Products Anaphylaxis     Amoxicillin Rash     Contrave [Naltrexone-Bupropion Hcl Er] Unknown     Sulfamethoxazole-Trimethoprim [Sulfamethoxazole-Trimethoprim] Itching and Rash         Current Outpatient Medications:      aspirin (ASA) 81 MG chewable tablet, Take 1 tablet (81 mg) by mouth daily., Disp: 90 tablet, Rfl: 3     atorvastatin (LIPITOR) 80 MG tablet, Take 1 tablet (80 mg) by mouth every evening., Disp: 90 tablet, Rfl: 3     cetirizine (ZYRTEC) 10 MG tablet, Take 1 tablet (10 mg) by mouth daily., Disp: 30 tablet, Rfl: 1     clopidogrel (PLAVIX) 75 MG  tablet, Take 1 tablet (75 mg) by mouth daily., Disp: 90 tablet, Rfl: 3     gabapentin (NEURONTIN) 100 MG capsule, Take 1 capsule (100 mg) by mouth at bedtime., Disp: 30 capsule, Rfl: 3     losartan (COZAAR) 25 MG tablet, Take 1 tablet (25 mg) by mouth daily, Disp: 90 tablet, Rfl: 3     omeprazole (PRILOSEC) 20 MG DR capsule, Take 1 capsule (20 mg) by mouth daily as needed (abdominal pain)., Disp: 30 capsule, Rfl: 3    Family History   Problem Relation Age of Onset     Breast Cancer No family hx of      Ovarian Cancer No family hx of        Social History     Socioeconomic History     Marital status:      Spouse name: Not on file     Number of children: Not on file     Years of education: Not on file     Highest education level: Not on file   Occupational History     Not on file   Tobacco Use     Smoking status: Never     Passive exposure: Never     Smokeless tobacco: Never   Vaping Use     Vaping status: Never Used   Substance and Sexual Activity     Alcohol use: No     Drug use: No     Sexual activity: Never   Other Topics Concern     Not on file   Social History Narrative    Mya.  [place]  2008     Social Drivers of Health     Financial Resource Strain: Not on file   Food Insecurity: Not on file   Transportation Needs: Not on file   Physical Activity: Not on file   Stress: Not on file   Social Connections: Not on file   Interpersonal Safety: Low Risk  (10/31/2024)    Interpersonal Safety      Do you feel physically and emotionally safe where you currently live?: Yes      Within the past 12 months, have you been hit, slapped, kicked or otherwise physically hurt by someone?: No      Within the past 12 months, have you been humiliated or emotionally abused in other ways by your partner or ex-partner?: No   Housing Stability: Not on file       Review of Systems - 10 point Review of Systems is negative except for left foot pain which is noted in HPI.    OBJECTIVE:  Appearance: alert, well appearing, and in  no distress.    General appearance: Patient is alert and fully cooperative with history & exam.  No sign of distress is noted during the visit.     Psychiatric: Affect is pleasant & appropriate.  Patient appears motivated to improve health.     Respiratory: Breathing is regular & unlabored while sitting.     HEENT: Hearing is intact to spoken word.  Speech is clear.  No gross evidence of visual impairment that would impact ambulation.    Vascular: Dorsalis pedis and posterior tibial pulses are palpable. There is pedal hair growth bilateral.  CFT < 3 sec from anterior tibial surface to distal digits bilateral. There is no appreciable edema noted.  Dermatologic: Turgor and texture are within normal limits. No coloration or temperature changes. No primary or secondary lesions noted.  Neurologic: All epicritic and proprioceptive sensations are grossly intact bilateral.  Musculoskeletal: Mild pain along the plantar left arch along the course of the plantar fascia. Limited ankle dorsiflexion with knee extended and flexed.         Again, thank you for allowing me to participate in the care of your patient.        Sincerely,        Herson Morales DPM

## 2024-11-25 NOTE — PATIENT INSTRUCTIONS
What are Prescription Custom Orthotics?  Custom orthotics are specially-made devices designed to support and comfort your feet. Prescription orthotics are crafted for you and no one else. They match the contours of your feet precisely and are designed for the way you move. Orthotics are only manufactured after a podiatrist has conducted a complete evaluation of your feet, ankles, and legs, so the orthotic can accommodate your unique foot structure and pathology.  Prescription orthotics are divided into two categories:  Functional orthotics are designed to control abnormal motion. They may be used to treat foot pain caused by abnormal motion; they can also be used to treat injuries such as shin splints or tendinitis. Functional orthotics are usually crafted of a semi-rigid material such as plastic or graphite.  Accommodative orthotics are softer and meant to provide additional cushioning and support. They can be used to treat diabetic foot ulcers, painful calluses on the bottom of the foot, and other uncomfortable conditions.  Podiatrists use orthotics to treat foot problems such as plantar fasciitis, bursitis, tendinitis, diabetic foot ulcers, and foot, ankle, and heel pain. Clinical research studies have shown that podiatrist-prescribed foot orthotics decrease foot pain and improve function.  Orthotics typically cost more than shoe inserts purchased in a retail store, but the additional cost is usually well worth it. Unlike shoe inserts, orthotics are molded to fit each individual foot, so you can be sure that your orthotics fit and do what they're supposed to do. Prescription orthotics are also made of top-notch materials and last many years when cared for properly. Insurance often helps pay for prescription orthotics.  What are Shoe Inserts?   You've seen them at the grocery store and at the mall. You've probably even seen them on TV and online. Shoe inserts are any kind of non-prescription foot support designed  to be worn inside a shoe. Pre-packaged, mass produced, arch supports are shoe inserts. So are the  custom-made  insoles and foot supports that you can order online or at retail stores. Unless the device has been prescribed by a doctor and crafted for your specific foot, it's a shoe insert, not a custom orthotic device--despite what the ads might say.  Shoe inserts can be very helpful for a variety of foot ailments, including flat arches and foot and leg pain. They can cushion your feet, provide comfort, and support your arches, but they can't correct biomechanical foot problems or cure long-standing foot issues.  The most common types of shoe inserts are:  Arch supports: Some people have high arches. Others have low arches or flat feet. Arch supports generally have a  bumped-up  appearance and are designed to support the foot's natural arch.   Insoles: Insoles slip into your shoe to provide extra cushioning and support. Insoles are often made of gel, foam, or plastic.   Heel liners: Heel liners, sometimes called heel pads or heel cups, provide extra cushioning in the heel region. They may be especially useful for patients who have foot pain caused by age-related thinning of the heels' natural fat pads.   Foot cushions: Do your shoes rub against your heel or your toes? Foot cushions come in many different shapes and sizes and can be used as a barrier between you and your shoe.  Choosing an Over-the-Counter Shoe Insert  Selecting a shoe insert from the wide variety of devices on the market can be overwhelming. Here are some podiatrist-tested tips to help you find the insert that best meets your needs:  Consider your health. Do you have diabetes? Problems with circulation? An over-the-counter insert may not be your best bet. Diabetes and poor circulation increase your risk of foot ulcers and infections, so schedule an appointment with a podiatrist. He or she can help you select a solution that won't cause additional  health problems.   Think about the purpose. Are you planning to run a marathon, or do you just need a little arch support in your work shoes? Look for a product that fits your planned level of activity.   Bring your shoes. For the insert to be effective, it has to fit into your shoes. So bring your sneakers, dress shoes, or work boots--whatever you plan to wear with your insert. Look for an insert that will fit the contours of your shoe.   Try them on. If all possible, slip the insert into your shoe and try it out. Walk around a little. How does it feel? Don't assume that feelings of pressure will go away with continued wear. (If you can't try the inserts at the store, ask about the store's return policy and hold on to your receipt.)    Please call one of the [facility] locations below to schedule an appointment. If you received a prescription please bring it with you to your appointment. Some locations are limited to what they carry.    Office Locations    [facility]  [address] [place] [address]  Home Medical Equipment, [address]   Phone: [phone]   Orthotics and Prosthetics, [address]   Phone: [phone]    [facility]   Home Medical Equipment   [address], [place] [address]  Phone :[phone]  Orthotics and Prosthetics   [address], [facility] [address]  Phone:[phone]          [facility] [place]  [address]  [address]   [place] [address]   Phone: [phone]    [facility]  [address]  [place] [address]  Phone: [phone]    [facility]  [address]  [place] [address]  Phone: [phone]  Fax: [phone]    [facility]   [address]  [place] [address]  Phone:  [phone]    [facility]  [address]  [place] [address]  Phone: [phone]    [facility]  [address]  [place] [address]  Phone: [phone]    [place]   [address] [place] [address]   Phone: [phone]    WEARING YOUR CUSTOM FOOT ORTHOTICS   Most insurance plans cover one pair of orthotics per year. You must check with your   insurance plan to see what your payment responsibility will be. Please call your   insurance company by calling the number on the back of your insurance card.   Orthotic's are non-refundable and non-returnable.   Orthotics are made of various designs. Some orthotics are covered with material that extends beyond your toes. If your orthotic is of this design, you will likely need to trim the toe end to get a proper fit. The insole from your shoe can be used as a template. Simply overlay the shoe insert on top of the custom orthotic. Align the heel end while tracing the length of the insert onto the custom orthotic. Use a large scissor to trim the toe end until you get a proper fit in the shoe.   The orthotic needs to be pushed as far back in the shoe as possible. The heel portion should not ride forward so as not to irritate your heel.   Orthotics are designed to work with socks. Excessive perspiration will shorten the life span of the orthotics. Remove the orthotic from the shoe frequently for proper drying.   The break-in period lasts for weeks. People new to orthotics will likely experience new aches and pains. The orthotic is forcing your foot into a new position. Arch, foot and leg muscle aches and fatigue are common during these weeks. Minor discomfort can be considered normal break in phenomenon. Start wearing your orthotic around your home your first day. Limited activity for one to two hours is recommended. You can increase one or two additional hours each  day provided the aches and pains are subsiding. The degree of discomfort, fatigue and problems will dictate the speed of break in. You may require multiple weeks to work up to full time use.   Do not continue wearing your orthotics if they are creating problems such as blisters or sores. Do not hesitate to call the clinic to speak with a nurse regarding orthotic   break in, fit, trimming, etc. You may also need to see the doctor if the orthotics are   simply not working out. Adjustments are sometimes made to improve orthotic   function.     Orthotics will only work in certain styles and types of shoes. Orthotics rarely work in dress shoes. Slip-ons, clogs, sandals and heels are particularly troublesome. Specially designed orthotics may be necessary for these types of shoes. Your custom orthotic was designed for activities that require appropriate walking or running shoes. Lace up athletic shoes, walking shoes or work boots should work appropriately. You may need a wider or longer shoe. Shoes with a removable  or insert work best. In general, you want to remove an insert from the shoe before placing the orthotic into the shoe. Shoes without a removable liner may not work as well.     When purchasing new shoes, bring your orthotics along to get a proper fit. Shop at stores that are familiar with orthotics.   Frequent washing of the orthotic may shorten the life span of the top cover. The top cover can be replaced but will generally last one to five years depending on use and foot perspiration.  What is Plantar Fasciitis?  Plantar Fasciitis also referred to as  heel pain syndrome  is the most common cause cited for pain in heels. Plantar Fasciitis is the pain and inflammation at the point where the flat band of tissue called the plantar fascia connects the heel bone to the toes. Plantar fascia maintains the arch of the foot. Applying pressure on it will make it swollen, weak, and irritated. This will make the  heel of your foot to ache when you walk or stand for a prolonged period.    Symptoms  Most people suffering from Plantar Fasciitis experience pain when they walk after resting their feet for a long duration. You may experience less pain and stiffness after a few steps. But your foot may hurt more as the day goes on. It may hurt the most when you climb stairs or after you stand for a long time. Continuous foot pain at night might be due to different problems like arthritis or nerve problems.    Causes  Straining the ligament which supports the arch can cause Plantar Fasciitis. Repeated straining can cause tiny tears in the ligament which lead to pain and swelling. Plantar Fasciitis is very evident in cases of:  Tight Achilles tendon or calf muscles   Running long distances, especially on hard & uneven surfaces   Problems of the foot arch   Sudden obesity   Wearing shoes with soft soles or poor arch support   In-toeing              PRO STRETCH - You can buy this on Amazon   Naproxen and naproxen sodium oral immediate-release tablets    Brand Names: Aflaxen, Aleve, Aleve Arthritis, All Day Relief, Anaprox, Anaprox DS, Naprosyn   What is this medicine?  NAPROXEN (na PROX en) is a non-steroidal anti-inflammatory drug (NSAID). It is used to reduce swelling and to treat pain. This medicine may be used for dental pain, headache, or painful monthly periods. It is also used for painful joint and muscular problems such as arthritis, tendinitis, bursitis, and gout.  How should I use this medicine?  Take this medicine by mouth with a glass of water. Follow the directions on the prescription label. Take it with food if your stomach gets upset. Try to not lie down for at least 10 minutes after you take it. Take your medicine at regular intervals. Do not take your medicine more often than directed. Long-term, continuous use may increase the risk of heart attack or stroke.  A special MedGuide will be given to you by the pharmacist  with each prescription and refill. Be sure to read this information carefully each time.  Talk to your pediatrician regarding the use of this medicine in children. Special care may be needed.  What side effects may I notice from receiving this medicine?  Side effects that you should report to your doctor or health care professional as soon as possible:  black or bloody stools, blood in the urine or vomit  blurred vision  chest pain  difficulty breathing or wheezing  nausea or vomiting  severe stomach pain  skin rash, skin redness, blistering or peeling skin, hives, or itching  slurred speech or weakness on one side of the body  swelling of eyelids, throat, lips  unexplained weight gain or swelling  unusually weak or tired  yellowing of eyes or skin  Side effects that usually do not require medical attention (report to your doctor or health care professional if they continue or are bothersome):  constipation  headache  heartburn  What may interact with this medicine?  alcohol  aspirin  cidofovir  diuretics  lithium  methotrexate  other drugs for inflammation like ketorolac or prednisone  pemetrexed  probenecid  warfarin  What if I miss a dose?  If you miss a dose, take it as soon as you can. If it is almost time for your next dose, take only that dose. Do not take double or extra doses.  Where should I keep my medicine?  Keep out of the reach of children.  Store at room temperature between 15 and 30 degrees C (59 and 86 degrees F). Keep container tightly closed. Throw away any unused medicine after the expiration date.  What should I tell my health care provider before I take this medicine?  They need to know if you have any of these conditions:  cigarette smoker  coronary artery bypass graft (CABG) surgery within the past 2 weeks  drink more than 3 alcohol-containing drinks a day  heart disease  high blood pressure  history of stomach bleeding  kidney disease  liver disease  lung or breathing disease, like  asthma  an unusual or allergic reaction to naproxen, aspirin, other NSAIDs, other medicines, foods, dyes, or preservatives  pregnant or trying to get pregnant  breast-feeding  What should I watch for while using this medicine?  Tell your doctor or health care professional if your pain does not get better. Talk to your doctor before taking another medicine for pain. Do not treat yourself.  This medicine does not prevent heart attack or stroke. In fact, this medicine may increase the chance of a heart attack or stroke. The chance may increase with longer use of this medicine and in people who have heart disease. If you take aspirin to prevent heart attack or stroke, talk with your doctor or health care professional.  Do not take other medicines that contain aspirin, ibuprofen, or naproxen with this medicine. Side effects such as stomach upset, nausea, or ulcers may be more likely to occur. Many medicines available without a prescription should not be taken with this medicine.  This medicine can cause ulcers and bleeding in the stomach and intestines at any time during treatment. Do not smoke cigarettes or drink alcohol. These increase irritation to your stomach and can make it more susceptible to damage from this medicine. Ulcers and bleeding can happen without warning symptoms and can cause death.  You may get drowsy or dizzy. Do not drive, use machinery, or do anything that needs mental alertness until you know how this medicine affects you. Do not stand or sit up quickly, especially if you are an older patient. This reduces the risk of dizzy or fainting spells.  This medicine can cause you to bleed more easily. Try to avoid damage to your teeth and gums when you brush or floss your teeth.    Medicine is given to help treat or prevent illness. But if you don t take it correctly, it might not help. It might even harm you. Your healthcare provider or pharmacist can help you learn the right way to take your medicine.  Listed below are some tips to help you take medicine safely.  Safety tips  Have a routine for taking each medicine. Make it part of something you do each day, such as brushing your teeth or eating a meal.  When you go to the hospital or your healthcare provider s office, bring all your current medicines in their original boxes or bottles. If you can t do that, bring an up-to-date list of your medicines.  Don't stop taking a prescription medicine unless your healthcare provider tells you to. Doing so could make your condition worse.  Don't share medicines.  Let your healthcare provider and pharmacist know of any allergies you have.  Taking prescription medicines with alcohol, street drugs, herbs, supplements, or even some over-the-counter medicines can be harmful. Talk to your healthcare provider or pharmacist before using any of these things while taking a prescription medicine.  When filling your prescriptions, try using the same pharmacy for all your medicines. If that isn't possible, let each pharmacist know what medicines you are already taking.  Keep medicines out of the reach of children and pets. Store medicines in a cool, dry, dark place -- not in the bathroom or in the kitchen near moisture or heat.  Don't use medicine that has  or that doesn t look or smell right. Call your pharmacist for instructions on how to dispose of your medicines or where you can take them for safe disposal.  Medicine that comes in a container for a single dose should be used only 1 time. If you use the container a second time, it may have germs in it that can cause illness. These illnesses include hepatitis B and C. They also include infections of the brain or spinal cord (meningitis and epidural abscess).

## 2024-11-25 NOTE — PROGRESS NOTES
FOOT AND ANKLE SURGERY/PODIATRY Progress Note        ASSESSMENT:   Plantar Fasciitis left  Gastrosoleus Equinus       TREATMENT:  -Patient has pain along the plantar left arch along the course of the plantar fascia consistent with plantar fasciitis.     -We discussed treatment options to include stretching exercises, anti-inflammatory medication, orthotics, steroid injections, physical therapy and a night splint.     -All questions invited and answered. I will start her on Naproxen and have referred her to [facility] for custom orthotics.    -I recommend she follow-up with her primary care provider to discuss left ear pain    -I have asked her to follow-up after using the orthotics x3-4 weeks if symptoms continue.     Herson Morales DPM  [facility] Podiat[facility]/Foot & Ankle Surgery      HPI: I was asked to see Rafael Colin today for left arch pain.  Patient reports she has had pain in the left arch for several weeks.  Denies trauma.  She also complains of left ear pain.    History reviewed. No pertinent past medical history.    History reviewed. No pertinent surgical history.    Allergies   Allergen Reactions    Shellfish-Derived Products Anaphylaxis    Amoxicillin Rash    Contrave [Naltrexone-Bupropion Hcl Er] Unknown    Sulfamethoxazole-Trimethoprim [Sulfamethoxazole-Trimethoprim] Itching and Rash         Current Outpatient Medications:     aspirin (ASA) 81 MG chewable tablet, Take 1 tablet (81 mg) by mouth daily., Disp: 90 tablet, Rfl: 3    atorvastatin (LIPITOR) 80 MG tablet, Take 1 tablet (80 mg) by mouth every evening., Disp: 90 tablet, Rfl: 3    cetirizine (ZYRTEC) 10 MG tablet, Take 1 tablet (10 mg) by mouth daily., Disp: 30 tablet, Rfl: 1    clopidogrel (PLAVIX) 75 MG tablet, Take 1 tablet (75 mg) by mouth daily., Disp: 90 tablet, Rfl: 3    gabapentin (NEURONTIN) 100 MG capsule, Take 1 capsule (100 mg) by mouth at bedtime., Disp: 30 capsule, Rfl: 3    losartan (COZAAR) 25 MG tablet, Take 1 tablet (25 mg) by  mouth daily, Disp: 90 tablet, Rfl: 3    omeprazole (PRILOSEC) 20 MG DR capsule, Take 1 capsule (20 mg) by mouth daily as needed (abdominal pain)., Disp: 30 capsule, Rfl: 3    Family History   Problem Relation Age of Onset    Breast Cancer No family hx of     Ovarian Cancer No family hx of        Social History     Socioeconomic History    Marital status:      Spouse name: Not on file    Number of children: Not on file    Years of education: Not on file    Highest education level: Not on file   Occupational History    Not on file   Tobacco Use    Smoking status: Never     Passive exposure: Never    Smokeless tobacco: Never   Vaping Use    Vaping status: Never Used   Substance and Sexual Activity    Alcohol use: No    Drug use: No    Sexual activity: Never   Other Topics Concern    Not on file   Social History Narrative    [employer].  [place] 2008     Social Drivers of Health     Financial Resource Strain: Not on file   Food Insecurity: Not on file   Transportation Needs: Not on file   Physical Activity: Not on file   Stress: Not on file   Social Connections: Not on file   Interpersonal Safety: Low Risk  (10/31/2024)    Interpersonal Safety     Do you feel physically and emotionally safe where you currently live?: Yes     Within the past 12 months, have you been hit, slapped, kicked or otherwise physically hurt by someone?: No     Within the past 12 months, have you been humiliated or emotionally abused in other ways by your partner or ex-partner?: No   Housing Stability: Not on file       Review of Systems - 10 point Review of Systems is negative except for left foot pain which is noted in HPI.    OBJECTIVE:  Appearance: alert, well appearing, and in no distress.    General appearance: Patient is alert and fully cooperative with history & exam.  No sign of distress is noted during the visit.     Psychiatric: Affect is pleasant & appropriate.  Patient appears motivated to improve health.     Respiratory:  Breathing is regular & unlabored while sitting.     HEENT: Hearing is intact to spoken word.  Speech is clear.  No gross evidence of visual impairment that would impact ambulation.    Vascular: Dorsalis pedis and posterior tibial pulses are palpable. There is pedal hair growth bilateral.  CFT < 3 sec from anterior tibial surface to distal digits bilateral. There is no appreciable edema noted.  Dermatologic: Turgor and texture are within normal limits. No coloration or temperature changes. No primary or secondary lesions noted.  Neurologic: All epicritic and proprioceptive sensations are grossly intact bilateral.  Musculoskeletal: Mild pain along the plantar left arch along the course of the plantar fascia. Limited ankle dorsiflexion with knee extended and flexed.

## 2024-12-09 ENCOUNTER — PATIENT OUTREACH (OUTPATIENT)
Dept: CARE COORDINATION | Facility: CLINIC | Age: 54
End: 2024-12-09
Payer: COMMERCIAL

## 2024-12-09 NOTE — PROGRESS NOTES
FRW Update  12/09/24 Established patient outreach attempted x 1 was unable to reach. Unable to leave a message on voicemail with call back information and requested return call.  Plan: Current outreach date reflects FRW 's follow up within 30 days.

## 2024-12-18 ENCOUNTER — PATIENT OUTREACH (OUTPATIENT)
Dept: CARE COORDINATION | Facility: CLINIC | Age: 54
End: 2024-12-18
Payer: COMMERCIAL

## 2024-12-18 NOTE — PROGRESS NOTES
Clinic Care Coordination Contact  Care Coordination Clinician Chart Review    Situation: Patient chart reviewed by Care Coordinator.       Background: Care Coordination Program started: 6/20/2024. Initial assessment completed and patient-centered care plan(s) were developed with participation from patient. Lead CC handed patient off to CHW for continued outreaches.       Assessment: Per chart review, patient outreach completed by CC CHW on 11/21/24.  Patient is actively working to accomplish goal(s). Patient's goal(s) appropriate and relevant at this time. Patient is not due for updated Plan of Care.  Assessments will be completed annually or as needed/with change of patient status.      Care Plan: PCA service       Problem: imparied mobility       Goal: Patient would like to explore if she could qualify for PCA service in the next 12 months.       Start Date: 7/16/2024 Expected End Date: 7/31/2025    This Visit's Progress: 50% Recent Progress: 40%    Note:     Barriers: language  Strengths: Willing to accept CCC support,   Patient expressed understanding of goal: Yes    Action steps to achieve this goal:  1. I will answer my phone when [facility]  calls to schedule an in-person assessment.   2. I will provide accurate information when needed.  3. I will update CCC team at outreach.     Notes: CHW placed referral to [facility] today, 8/26/2024 and wait time is another 2 to 3 more months and pending. Referral also place to [facility] today, 6/26/2024 and [facility] worker is helping to follow up.                            Care Plan: DME       Problem: impaired mobility       Goal: Patient would like to explore if she could qualify for a cane and incontinence supply in the next 90 days.       Start Date: 8/26/2024 Expected End Date: 12/31/2024    This Visit's Progress: 40% Recent Progress: 30%    Note:     Barriers: language   Strengths: Accepting of support  Patient expressed understanding of goal:  Yes    Action steps to achieve this goal:  1. I will take hard copy to [facility] with support from my arm worker in the next 30 days. Mis  and will deliver it to patient.  2. I will follow up with CCC in the next month regarding this goal for additional coordination support.    Note: CHW sent message to PCP and requesting new order for pull-ups.                                  Plan/Recommendations: The patient will continue working with Care Coordination to achieve goal(s) as above. CHW will continue outreaches at minimum every 30 days and will involve Lead CC as needed or if patient is ready to move to Maintenance. Lead CC will continue to monitor CHW outreaches and patient's progress to goal(s) every 6 weeks.     Plan of Care updated and sent to patient: No

## 2024-12-31 ENCOUNTER — PATIENT OUTREACH (OUTPATIENT)
Dept: CARE COORDINATION | Facility: CLINIC | Age: 54
End: 2024-12-31
Payer: COMMERCIAL

## 2024-12-31 DIAGNOSIS — R05.9 COUGH, UNSPECIFIED TYPE: ICD-10-CM

## 2024-12-31 DIAGNOSIS — J30.89 NON-SEASONAL ALLERGIC RHINITIS, UNSPECIFIED TRIGGER: ICD-10-CM

## 2024-12-31 RX ORDER — CETIRIZINE HYDROCHLORIDE 10 MG/1
10 TABLET ORAL DAILY
Qty: 30 TABLET | Refills: 5 | Status: SHIPPED | OUTPATIENT
Start: 2024-12-31

## 2024-12-31 NOTE — PROGRESS NOTES
Clinic Care Coordination Contact  Community Health Worker Follow Up    Care Gaps:   Health Maintenance Due   Topic Date Due    ADVANCE CARE PLANNING  Never done    COLORECTAL CANCER SCREENING  Never done    YEARLY PREVENTIVE VISIT  05/31/2020    Pneumococcal Vaccine: 50+ Years (1 of 1 - PCV) Never done    ZOSTER IMMUNIZATION (1 of 2) Never done    HPV TEST  05/31/2024    PAP  05/31/2024     Scheduled on 1/07/2025 for preventive care visit.      Care Plan:   Care Plan: PCA service       Problem: imparied mobility       Goal: Patient would like to explore if she could qualify for PCA service in the next 12 months.       Start Date: 7/16/2024 Expected End Date: 7/31/2025    This Visit's Progress: 60% Recent Progress: 50%    Note:     Barriers: language  Strengths: Willing to accept CCC support,   Patient expressed understanding of goal: Yes    Action steps to achieve this goal:  1. I will answer my phone when [facility]  calls to schedule an in-person assessment.   2. I will provide accurate information when needed.  3. I will update CCC team at outreach.     Notes: CHW placed referral to [facility] today, 8/26/2024 and wait time is another 1 to 2 more months and pending. Referral also place to [facility] today, 6/26/2024 and [facility] worker is helping to follow up.                            Care Plan: DME       Problem: impaired mobility       Goal: Patient would like to explore if she could qualify for a cane and incontinence supply in the next 90 days.       Start Date: 8/26/2024 Expected End Date: 12/31/2024    This Visit's Progress: 50% Recent Progress: 40%    Note:     Barriers: language   Strengths: Accepting of support  Patient expressed understanding of goal: Yes    Action steps to achieve this goal:  1. I will take hard copy to [facility] with support from my [facility] worker in the next 30 days. iMs  patient has not received it yet.  2. I will follow up with CCC in the  next month regarding this goal for additional coordination support.    Note: Patient received pull-ups last month and CHW requested another refills from [facility]. CHW unable to verified with [employer] both [facility] and [facility] if cane was issued from insurance.                           Intervention and Education during outreach:   -CHW informed patient of upcoming appointments and transportation arranged as appropriated.  -CHW attempted to reschedule patient's Audiology and ENT at closer location but it's booking out far and the current one scheduled on 1/29/2025 is in [place].  -CHW attempted to call [facility] and [facility] and unable to obtain info if cane was issued out from health insurance.   -Per patient's previous ARM worker that cane was picked up but never delivery.  -Patient was informed to call with questions or concerns.      CHW Next Outreach: In one month.

## 2025-01-02 PROBLEM — R53.1 LEFT-SIDED WEAKNESS: Status: RESOLVED | Noted: 2024-06-18 | Resolved: 2025-01-02

## 2025-01-02 PROBLEM — Z86.73 HISTORY OF STROKE: Status: RESOLVED | Noted: 2024-06-24 | Resolved: 2025-01-02

## 2025-01-07 ENCOUNTER — OFFICE VISIT (OUTPATIENT)
Dept: FAMILY MEDICINE | Facility: CLINIC | Age: 55
End: 2025-01-07
Payer: COMMERCIAL

## 2025-01-07 ENCOUNTER — ORDERS ONLY (AUTO-RELEASED) (OUTPATIENT)
Dept: FAMILY MEDICINE | Facility: CLINIC | Age: 55
End: 2025-01-07

## 2025-01-07 VITALS
HEART RATE: 54 BPM | OXYGEN SATURATION: 98 % | HEIGHT: 56 IN | BODY MASS INDEX: 32.87 KG/M2 | SYSTOLIC BLOOD PRESSURE: 142 MMHG | RESPIRATION RATE: 20 BRPM | WEIGHT: 146.12 LBS | TEMPERATURE: 97.7 F | DIASTOLIC BLOOD PRESSURE: 83 MMHG

## 2025-01-07 DIAGNOSIS — I63.9 MULTIPLE CEREBRAL INFARCTIONS (H): ICD-10-CM

## 2025-01-07 DIAGNOSIS — I69.398 ALTERATION OF SENSATIONS, POST-STROKE: ICD-10-CM

## 2025-01-07 DIAGNOSIS — M54.9 UPPER BACK PAIN: ICD-10-CM

## 2025-01-07 DIAGNOSIS — M79.672 LEFT FOOT PAIN: ICD-10-CM

## 2025-01-07 DIAGNOSIS — Z12.11 SCREEN FOR COLON CANCER: ICD-10-CM

## 2025-01-07 DIAGNOSIS — F32.1 MAJOR DEPRESSIVE DISORDER, SINGLE EPISODE, MODERATE (H): ICD-10-CM

## 2025-01-07 DIAGNOSIS — Z00.00 ROUTINE GENERAL MEDICAL EXAMINATION AT A HEALTH CARE FACILITY: Primary | ICD-10-CM

## 2025-01-07 DIAGNOSIS — Z12.4 CERVICAL CANCER SCREENING: ICD-10-CM

## 2025-01-07 DIAGNOSIS — N39.3 FEMALE STRESS INCONTINENCE: ICD-10-CM

## 2025-01-07 DIAGNOSIS — R20.9 ALTERATION OF SENSATIONS, POST-STROKE: ICD-10-CM

## 2025-01-07 DIAGNOSIS — R53.1 LEFT-SIDED WEAKNESS: ICD-10-CM

## 2025-01-07 DIAGNOSIS — I10 ESSENTIAL HYPERTENSION: ICD-10-CM

## 2025-01-07 PROCEDURE — 87624 HPV HI-RISK TYP POOLED RSLT: CPT | Performed by: FAMILY MEDICINE

## 2025-01-07 PROCEDURE — 99214 OFFICE O/P EST MOD 30 MIN: CPT | Mod: 25 | Performed by: FAMILY MEDICINE

## 2025-01-07 PROCEDURE — G2211 COMPLEX E/M VISIT ADD ON: HCPCS | Performed by: FAMILY MEDICINE

## 2025-01-07 PROCEDURE — T1013 SIGN LANG/ORAL INTERPRETER: HCPCS

## 2025-01-07 PROCEDURE — 99396 PREV VISIT EST AGE 40-64: CPT | Mod: 25 | Performed by: FAMILY MEDICINE

## 2025-01-07 PROCEDURE — 90472 IMMUNIZATION ADMIN EACH ADD: CPT | Performed by: FAMILY MEDICINE

## 2025-01-07 PROCEDURE — 90471 IMMUNIZATION ADMIN: CPT | Performed by: FAMILY MEDICINE

## 2025-01-07 PROCEDURE — 90677 PCV20 VACCINE IM: CPT | Performed by: FAMILY MEDICINE

## 2025-01-07 PROCEDURE — G0145 SCR C/V CYTO,THINLAYER,RESCR: HCPCS | Performed by: FAMILY MEDICINE

## 2025-01-07 PROCEDURE — 90750 HZV VACC RECOMBINANT IM: CPT | Performed by: FAMILY MEDICINE

## 2025-01-07 RX ORDER — AMITRIPTYLINE HYDROCHLORIDE 10 MG/1
10 TABLET ORAL AT BEDTIME
Qty: 30 TABLET | Refills: 3 | Status: SHIPPED | OUTPATIENT
Start: 2025-01-07

## 2025-01-07 RX ORDER — ACETAMINOPHEN 500 MG
500-1000 TABLET ORAL EVERY 6 HOURS PRN
Qty: 50 TABLET | Refills: 1 | Status: SHIPPED | OUTPATIENT
Start: 2025-01-07

## 2025-01-07 RX ORDER — GABAPENTIN 300 MG/1
300 CAPSULE ORAL AT BEDTIME
Qty: 30 CAPSULE | Refills: 3 | Status: SHIPPED | OUTPATIENT
Start: 2025-01-07

## 2025-01-07 ASSESSMENT — PATIENT HEALTH QUESTIONNAIRE - PHQ9
SUM OF ALL RESPONSES TO PHQ QUESTIONS 1-9: 8
SUM OF ALL RESPONSES TO PHQ QUESTIONS 1-9: 8
10. IF YOU CHECKED OFF ANY PROBLEMS, HOW DIFFICULT HAVE THESE PROBLEMS MADE IT FOR YOU TO DO YOUR WORK, TAKE CARE OF THINGS AT HOME, OR GET ALONG WITH OTHER PEOPLE: SOMEWHAT DIFFICULT

## 2025-01-07 NOTE — Clinical Note
"Hi - podiatry ordered a custom orthotic for her left leg - there is an order under \"other orders,\" would you be able to assist her ins cheduling an appointment at [facility]? She also says she has not received her cane yet. Thank you!"

## 2025-01-07 NOTE — PROGRESS NOTES
Preventive Care Visit  [facility] ASHOKMICHELLE Adams MD, Family Medicine  Jan 7, 2025      Assessment & Plan     Routine general medical examination at a health care facility  Labs, screenings, and vaccines as ordered and counseling as detailed below.  - Pneumococcal 20 Valent Conjugate (PCV20)  - ZOSTER RECOMBINANT ADJUVANTED (SHINGRIX)    Female stress incontinence  Has adult briefs    Cervical cancer screening  - HPV and Gynecologic Cytology Panel - Recommended Age 30 - 65 Years    Screen for colon cancer  She submitted a Cologuard sample in November but it was invalid.  Agrees to trying again.  Of note, she is on Plavix so is at risk of having a false positive but she prefers Cologuard to colonoscopy.  - COLOGUARD([employer]); Future    Essential hypertension  Blood pressure is not at goal today but she did not take her morning medications including losartan.  Will have her come back for a RN blood pressure check after taking her medicines    Multiple cerebral infarctions (H)  Left-sided weakness  Alteration of sensations, post-stroke  Left foot pain  Risk factors are being medically managed with aspirin, clopidogrel, atorvastatin, blood pressure control.  She has a history of prediabetes and will be due for A1c in the summer.  She has residual left-sided weakness and left leg/foot pain.  She would like to restart physical therapy, she just needs a ride to her appointments.  Asked staff to help her schedule this today.  She has been doing home exercises.  Will increase gabapentin from 100 mg to 300 mg to address pain.  A custom orthotic was ordered by podiatry in November but she has not gone to an appointment for this.  Will have care coordination to help her set up an appointment.  - Physical Therapy  Referral; Future  - gabapentin (NEURONTIN) 300 MG capsule; Take 1 capsule (300 mg) by mouth at bedtime.    Upper back pain  Occasional upper back pain, start Tylenol  as needed  - acetaminophen (TYLENOL) 500 MG tablet; Take 1-2 tablets (500-1,000 mg) by mouth every 6 hours as needed for mild pain.    Major depressive disorder, single episode, moderate (H)  Endorses depressive symptoms.  She endorses passive SI with possible plan but no intent.  Reviewed protective factors.  Reviewed crisis resources.  She is able to contract for safety.  Start amitriptyline to address depressive symptoms and pain, including post stroke pain and headaches.  - amitriptyline (ELAVIL) 10 MG tablet; Take 1 tablet (10 mg) by mouth at bedtime.    Patient has been advised of split billing requirements and indicates understanding: Yes    The longitudinal plan of care for the diagnosis(es)/condition(s) as documented were addressed during this visit. Due to the added complexity in care, I will continue to support Paw in the subsequent management and with ongoing continuity of care.    Counseling  Appropriate preventive services were addressed with this patient via screening, questionnaire, or discussion as appropriate for fall prevention, nutrition, physical activity, Tobacco-use cessation, social engagement, weight loss and cognition.  Checklist reviewing preventive services available has been given to the patient.  Reviewed patient's diet, addressing concerns and/or questions.   The patient was instructed to see the dentist every 6 months.   The patient's PHQ-9 score is consistent with mild depression. She was provided with information regarding depression.       Follow-up in 3 months      Subjective   Paw is a 54 year old with a History of right UZIEL and MCA stroke due to intracranial atherosclerosis, HLD, HTN, prediabetes, urinary incontinence., presenting for the following:  Physical        1/7/2025     9:01 AM   Additional Questions   Roomed by JODY GARCÍA CMA   Accompanied by None          HPI      Pain in right calf and foot  - can't sleep at night    Has not gotten another cane  - got cane and walker  at the hospital but left cane at the hospital    Physical therapy???  - she is doing exercises at home  - would like to go back in person    Stroke    Gets pain in head after stroke  Also ear is bothering her    Epigastric pain  - no more stomach pain    Incontinence    Cologuard  - states she got a box and delivered it to the front    Sometimes gets pain in back where she had surgery for an abscess 10y ago - soreness, when kids step on it, it feels good  Not taking any other meds  Once or twice a week    PATIENT HEALTH QUESTIONNAIRE-9 (PHQ - 9)    Over the last 2 weeks, how often have you been bothered by any of the following problems?    1. Little interest or pleasure in doing things -  (Proxy-Rptd) More than half the days   2. Feeling down, depressed, or hopeless -  (Proxy-Rptd) More than half the days   3. Trouble falling or staying asleep, or sleeping too much - (Proxy-Rptd) More than half the days   4. Feeling tired or having little energy -  (Proxy-Rptd) Several days   5. Poor appetite or overeating -  (Proxy-Rptd) Several days   6. Feeling bad about yourself - or that you are a failure or have let yourself or your family down -  (Proxy-Rptd) Not at all   7. Trouble concentrating on things, such as reading the newspaper or watching television - (Proxy-Rptd) Not at all   8. Moving or speaking so slowly that other people could have noticed? Or the opposite - being so fidgety or restless that you have been moving around a lot more than usual (Proxy-Rptd) Not at all   9. Thoughts that you would be better off dead or of hurting  yourself in some way (Proxy-Rptd) Not at all   Total Score: (Proxy-Rptd) 8     If you checked off any problems, how difficult have these problems made it for you to do your work, take care of things at home, or get along with other people?      Developed by Rafal Kelsey, Milena Mtathews, Sridhar Flores and colleagues, with an educational jerry from [employer] No permission  required to reproduce, translate, display or distribute. permission required to reproduce, translate, display or distribute.    Sometimes has thoughts of better off dead  Sometimes thinks about taking all of her meds   Feels like she is asking too much for help  No children in area to support her      Health Care Directive  Patient does not have a Health Care Directive: not discussed dueto timeconstraints      1/7/2025   General Health   How would you rate your overall physical health? (!) POOR         1/7/2025   Nutrition   Three or more servings of calcium each day? Yes   Diet: Regular (no restrictions)   How many servings of fruit and vegetables per day? 4 or more   How many sweetened beverages each day? 0-1          No data to display                  1/7/2025   Social Factors   Worry food won't last until get money to buy more No   Food not last or not have enough money for food? No   Do you have housing? (Housing is defined as stable permanent housing and does not include staying ouside in a car, in a tent, in an abandoned building, in an overnight shelter, or couch-surfing.) Yes   Are you worried about losing your housing? No   Lack of transportation? No   Unable to get utilities (heat,electricity)? No         1/7/2025   Fall Risk   Fallen 2 or more times in the past year? No   Trouble with walking or balance? Yes   Gait Speed Test (Document in seconds) 14.09   Gait Speed Test Interpretation Greater than 5.01 seconds - ABNORMAL          1/7/2025   Dental   Dentist two times every year? (!) NO         1/7/2025   TB Screening   Were you born outside of the [place]? Yes       Today's PHQ-9 Score:       1/7/2025     9:00 AM   PHQ-9 SCORE   PHQ-9 Total Score MyChart 8 (Mild depression)   PHQ-9 Total Score 8        Proxy-reported         1/7/2025   Substance Use   Alcohol more than 3/day or more than 7/wk Not Applicable   Do you use any other substances recreationally? No     Social History     Tobacco Use    Smoking  status: Never     Passive exposure: Never    Smokeless tobacco: Never   Vaping Use    Vaping status: Never Used   Substance Use Topics    Alcohol use: No    Drug use: No           2/10/2023   LAST FHS-7 RESULTS   1st degree relative breast or ovarian cancer No   Any relative bilateral breast cancer No   Any male have breast cancer No   Any ONE woman have BOTH breast AND ovarian cancer No   Any woman with breast cancer before 50yrs No   2 or more relatives with breast AND/OR ovarian cancer No   2 or more relatives with breast AND/OR bowel cancer No        Mammogram Screening - Mammogram every 1-2 years updated in Health Maintenance based on mutual decision making        2025   STI Screening   New sexual partner(s) since last STI/HIV test? (!) DECLINE     History of abnormal Pap smear: No - age 30- 64 PAP with HPV every 5 years recommended        Latest Ref Rng & Units 2019    11:46 AM   PAP / HPV   PAP Negative for squamous intraepithelial lesion or malignancy. Negative for squamous intraepithelial lesion or malignancy  Electronically signed by Tere Chavira MD on 2019 at 11:31 AM      HPV 16 DNA NEG Negative    HPV 18 DNA NEG Negative    Other HR HPV NEG Negative      ASCVD Risk   The ASCVD Risk score (Maryann CHACON, et al., 2019) failed to calculate for the following reasons:    Risk score cannot be calculated because patient has a medical history suggesting prior/existing ASCVD    Fracture Risk Assessment Tool  Link to Frax Calculator  Use the information below to complete the Frax calculator  : 1970  Sex: female  Weight (kg): 66.3 kg (actual weight)  Height (cm): 142.9 cm  Previous Fragility Fracture:  No  History of parent with fractured hip:  No  Current Smoking:  No  Patient has been on glucocorticoids for more than 3 months (5mg/day or more): No  Rheumatoid Arthritis on Problem List:  No  Secondary Osteoporosis on Problem List:  No  Consumes 3 or more units of alcohol per day:  "No  Femoral Neck BMD (g/cm2)         1/7/2025   FRAX Calculated Score- 10yr Fracture Probability (%)   Major Osteoporotic- without BMD 3.7 %   Hip Fracture- without BMD 0.2 %          Reviewed and updated as needed this visit by Provider   Tobacco  Allergies  Meds  Problems  Med Hx  Surg Hx  Fam Hx                  Review of Systems  Constitutional, HEENT, cardiovascular, pulmonary, gi and gu systems are negative, except as otherwise noted.     Objective    Exam  BP (!) 142/83 (BP Location: Left arm, Patient Position: Sitting, Cuff Size: Adult Large)   Pulse 54   Temp 97.7  F (36.5  C) (Oral)   Resp 20   Ht 1.429 m (4' 8.25\")   Wt 66.3 kg (146 lb 1.9 oz)   LMP  (LMP Unknown)   SpO2 98%   BMI 32.47 kg/m     Estimated body mass index is 32.47 kg/m  as calculated from the following:    Height as of this encounter: 1.429 m (4' 8.25\").    Weight as of this encounter: 66.3 kg (146 lb 1.9 oz).    Physical Exam  GENERAL: alert and no distress  EYES: Eyes grossly normal to inspection, PERRL and conjunctivae and sclerae normal  HENT: ear canals and TM's normal, nose and mouth without ulcers or lesions  NECK: no adenopathy, no asymmetry, masses, or scars  RESP: lungs clear to auscultation - no rales, rhonchi or wheezes  CV: regular rate and rhythm, normal S1 S2, no S3 or S4, no murmur, click or rub, no peripheral edema  ABDOMEN: soft, nontender, no hepatosplenomegaly, no masses and bowel sounds normal   (female): normal female external genitalia, normal urethral meatus, normal vaginal mucosa  MS: left leg without edema, wearing orthotic, no tenderness to palpation, bunion  SKIN: no suspicious lesions or rashes  NEURO: Normal strength and tone, mentation intact and speech normal  PSYCH: mentation appears normal, affect normal/bright        Signed Electronically by: Kristi Adams MD    "

## 2025-01-07 NOTE — PATIENT INSTRUCTIONS
Patient Education   You have been provided the Preventive Care Advice document.    Additional copies can be found here: [email or web address]  Preventive Care Advice   This is general advice given by our system to help you stay healthy. However, your care team may have specific advice just for you. Please talk to your care team about your preventive care needs.  Nutrition  Eat 5 or more servings of fruits and vegetables each day.  Try wheat bread, brown rice and whole grain pasta (instead of white bread, rice, and pasta).  Get enough calcium and vitamin D. Check the label on foods and aim for 100% of the RDA (recommended daily allowance).  Lifestyle  Exercise at least 150 minutes each week  (30 minutes a day, 5 days a week).  Do muscle strengthening activities 2 days a week. These help control your weight and prevent disease.  No smoking.  Wear sunscreen to prevent skin cancer.  Have a dental exam and cleaning every 6 months.  Yearly exams  See your health care team every year to talk about:  Any changes in your health.  Any medicines your care team has prescribed.  Preventive care, family planning, and ways to prevent chronic diseases.  Shots (vaccines)   HPV shots (up to age 26), if you've never had them before.  Hepatitis B shots (up to age 59), if you've never had them before.  COVID-19 shot: Get this shot when it's due.  Flu shot: Get a flu shot every year.  Tetanus shot: Get a tetanus shot every 10 years.  Pneumococcal, hepatitis A, and RSV shots: Ask your care team if you need these based on your risk.  Shingles shot (for age 50 and up)  General health tests  Diabetes screening:  Starting at age 35, Get screened for diabetes at least every 3 years.  If you are younger than age 35, ask your care team if you should be screened for diabetes.  Cholesterol test: At age 39, start having a cholesterol test every 5 years, or more often if advised.  Bone density scan (DEXA): At age 50, ask your care team if you  should have this scan for osteoporosis (brittle bones).  Hepatitis C: Get tested at least once in your life.  STIs (sexually transmitted infections)  Before age 24: Ask your care team if you should be screened for STIs.  After age 24: Get screened for STIs if you're at risk. You are at risk for STIs (including HIV) if:  You are sexually active with more than one person.  You don't use condoms every time.  You or a partner was diagnosed with a sexually transmitted infection.  If you are at risk for HIV, ask about PrEP medicine to prevent HIV.  Get tested for HIV at least once in your life, whether you are at risk for HIV or not.  Cancer screening tests  Cervical cancer screening: If you have a cervix, begin getting regular cervical cancer screening tests starting at age 21.  Breast cancer scan (mammogram): If you've ever had breasts, begin having regular mammograms starting at age 40. This is a scan to check for breast cancer.  Colon cancer screening: It is important to start screening for colon cancer at age 45.  Have a colonoscopy test every 10 years (or more often if you're at risk) Or, ask your provider about stool tests like a FIT test every year or Cologuard test every 3 years.  To learn more about your testing options, visit:   .  For help making a decision, visit:   https://bit.ly/rj24012.  Prostate cancer screening test: If you have a prostate, ask your care team if a prostate cancer screening test (PSA) at age 55 is right for you.  Lung cancer screening: If you are a current or former smoker ages 50 to 80, ask your care team if ongoing lung cancer screenings are right for you.  For informational purposes only. Not to replace the advice of your health care provider. Copyright   2023 [employer]. All rights reserved. Clinically reviewed by the [facility] Transitions [facility]. [employer] [id number] - REV 01/24.  Preventing Falls: Care Instructions  Injuries and health problems such as trouble  walking or poor eyesight can increase your risk of falling. So can some medicines. But there are things you can do to help prevent falls. You can exercise to get stronger. You can also arrange your home to make it safer.    Talk to your doctor about the medicines you take. Ask if any of them increase the risk of falls and whether they can be changed or stopped.   Try to exercise regularly. It can help improve your strength and balance. This can help lower your risk of falling.         Practice fall safety and prevention.   Wear low-heeled shoes that fit well and give your feet good support. Talk to your doctor if you have foot problems that make this hard.  Carry a cellphone or wear a medical alert device that you can use to call for help.  Use stepladders instead of chairs to reach high objects. Don't climb if you're at risk for falls. Ask for help, if needed.  Wear the correct eyeglasses, if you need them.        Make your home safer.   Remove rugs, cords, clutter, and furniture from walkways.  Keep your house well lit. Use night-lights in hallways and bathrooms.  Install and use sturdy handrails on stairways.  Wear nonskid footwear, even inside. Don't walk barefoot or in socks without shoes.        Be safe outside.   Use handrails, curb cuts, and ramps whenever possible.  Keep your hands free by using a shoulder bag or backpack.  Try to walk in well-lit areas. Watch out for uneven ground, changes in pavement, and debris.  Be careful in the winter. Walk on the grass or gravel when sidewalks are slippery. Use de-icer on steps and walkways. Add non-slip devices to shoes.    Put grab bars and nonskid mats in your shower or tub and near the toilet. Try to use a shower chair or bath bench when bathing.   Get into a tub or shower by putting in your weaker leg first. Get out with your strong side first. Have a phone or medical alert device in the bathroom with you.   Where can you learn more?  Go to  "https://www.VANDOLAY.net/patiented  Enter G117 in the search box to learn more about \"Preventing Falls: Care Instructions.\"  Current as of: July 31, 2024  Content Version: 14.3    2024 [employer].   Care instructions adapted under license by your healthcare professional. If you have questions about a medical condition or this instruction, always ask your healthcare professional. [employer] disclaims any warranty or liability for your use of this information.    Learning About Depression Screening  What is depression screening?  Depression screening is a way to see if you have depression symptoms. It may be done by a doctor or counselor. It's often part of a routine checkup. That's because your mental health is just as important as your physical health.  Depression is a mental health condition that affects how you feel, think, and act. You may:  Have less energy.  Lose interest in your daily activities.  Feel sad and grouchy for a long time.  Depression is very common. It affects people of all ages.  Many things can lead to depression. Some people become depressed after they have a stroke or find out they have a major illness like cancer or heart disease. The death of a loved one or a breakup may lead to depression. It can run in families. Most experts believe that a combination of inherited genes and stressful life events can cause it.  What happens during screening?  You may be asked to fill out a form about your depression symptoms. You and the doctor will discuss your answers. The doctor may ask you more questions to learn more about how you think, act, and feel.  What happens after screening?  If you have symptoms of depression, your doctor will talk to you about your options.  Doctors usually treat depression with medicines or counseling. Often, combining the two works best. Many people don't get help because they think that they'll get over the depression on their own. But people with " "depression may not get better unless they get treatment.  The cause of depression is not well understood. There may be many factors involved. But if you have depression, it's not your fault.  A serious symptom of depression is thinking about death or suicide. If you or someone you care about talks about this or about feeling hopeless, get help right away.  It's important to know that depression can be treated. Medicine, counseling, and self-care may help.  Where can you learn more?  Go to https://www.makemyreturns.com.net/patiented  Enter T185 in the search box to learn more about \"Learning About Depression Screening.\"  Current as of: July 31, 2024  Content Version: 14.3    2024 [employer].   Care instructions adapted under license by your healthcare professional. If you have questions about a medical condition or this instruction, always ask your healthcare professional. [employer] disclaims any warranty or liability for your use of this information.       "

## 2025-01-08 ENCOUNTER — TELEPHONE (OUTPATIENT)
Dept: FAMILY MEDICINE | Facility: CLINIC | Age: 55
End: 2025-01-08
Payer: COMMERCIAL

## 2025-01-08 LAB
HPV HR 12 DNA CVX QL NAA+PROBE: NEGATIVE
HPV16 DNA CVX QL NAA+PROBE: NEGATIVE
HPV18 DNA CVX QL NAA+PROBE: NEGATIVE
HUMAN PAPILLOMA VIRUS FINAL DIAGNOSIS: NORMAL

## 2025-01-08 NOTE — PROGRESS NOTES
I have called [facility] at [phone] and left them a voice message to call me back to coordinate. Regarding cane, I am still trying to coordinate with [facility] worker and it sounded like someone picked it up and has not delivery.    Thank you,  Karsten

## 2025-01-08 NOTE — TELEPHONE ENCOUNTER
Patient Quality Outreach    Patient is due for the following:   Depression  -  DAP    Action(s) Taken:   Patient has upcoming appointment, these items will be addressed at that time.    Type of outreach:    Chart review performed, no outreach needed.    Questions for provider review:    None           Alex Goode MA  Chart routed to Care Team.

## 2025-01-08 NOTE — PROGRESS NOTES
I added patient on my schedule to assist patient schedule appointment for a custom orthotic for her left leg. Thanks.

## 2025-01-09 LAB
BKR AP ASSOCIATED HPV REPORT: NORMAL
BKR LAB AP GYN ADEQUACY: NORMAL
BKR LAB AP GYN INTERPRETATION: NORMAL
BKR LAB AP PREVIOUS ABNORMAL: NORMAL
PATH REPORT.COMMENTS IMP SPEC: NORMAL
PATH REPORT.COMMENTS IMP SPEC: NORMAL
PATH REPORT.RELEVANT HX SPEC: NORMAL

## 2025-01-13 ENCOUNTER — VIRTUAL VISIT (OUTPATIENT)
Dept: INTERPRETER SERVICES | Facility: CLINIC | Age: 55
End: 2025-01-13

## 2025-01-13 ENCOUNTER — PATIENT OUTREACH (OUTPATIENT)
Dept: CARE COORDINATION | Facility: CLINIC | Age: 55
End: 2025-01-13

## 2025-01-13 ENCOUNTER — THERAPY VISIT (OUTPATIENT)
Dept: PHYSICAL THERAPY | Facility: REHABILITATION | Age: 55
End: 2025-01-13
Attending: FAMILY MEDICINE
Payer: COMMERCIAL

## 2025-01-13 DIAGNOSIS — I63.9 MULTIPLE CEREBRAL INFARCTIONS (H): ICD-10-CM

## 2025-01-13 DIAGNOSIS — R53.1 LEFT-SIDED WEAKNESS: ICD-10-CM

## 2025-01-13 DIAGNOSIS — M79.672 LEFT FOOT PAIN: ICD-10-CM

## 2025-01-13 DIAGNOSIS — R20.9 ALTERATION OF SENSATIONS, POST-STROKE: ICD-10-CM

## 2025-01-13 DIAGNOSIS — I69.398 ALTERATION OF SENSATIONS, POST-STROKE: ICD-10-CM

## 2025-01-13 PROCEDURE — 97161 PT EVAL LOW COMPLEX 20 MIN: CPT | Mod: GP

## 2025-01-13 PROCEDURE — T1013 SIGN LANG/ORAL INTERPRETER: HCPCS | Mod: GT

## 2025-01-13 PROCEDURE — 97110 THERAPEUTIC EXERCISES: CPT | Mod: GP

## 2025-01-13 NOTE — PROGRESS NOTES
PHYSICAL THERAPY EVALUATION  Type of Visit: Evaluation    Subjective         Presenting condition or subjective complaint:    Date of onset: 01/07/25 (provider order)    Relevant medical history:     Dates & types of surgery:      Patient Active Problem List   Diagnosis Code    Hyperlipidemia LDL goal <130 E78.5    Neck Cervical Mass Left (___cm) R22.0, R22.1    Allergic rhinitis J30.9    Vitamin D Deficiency E55.9    Obesity E66.9    Deafness in left ear H91.92    Lumbar back pain M54.50    Left-sided weakness R53.1    Multiple cerebral infarctions (H) I63.9    Intracranial atherosclerosis I67.2    Prediabetes R73.03    Female stress incontinence N39.3    Essential hypertension I10    Alteration of sensations, post-stroke I69.398, R20.9    Left foot pain M79.672    Upper back pain M54.9    Major depressive disorder, single episode, moderate (H) F32.1       Prior diagnostic imaging/testing results:       Prior therapy history for the same diagnosis, illness or injury:        Living Environment  --in-person  did not show today, will continue to assess however pt reports no stairs in her home or significant concerns   Social support:     Type of home:     Stairs to enter the home:         Ramp:     Stairs inside the home:         Help at home:    Equipment owned:       Employment:      Hobbies/Interests:      Patient goals for therapy:       Pt reports today for L plantar fascia pain and L calf pain, along with L-sided weakness and gait impairments with S&S of L-sided neglect after multiple cerebral infarctions.. Pt presents with AFO, per chart review she should be getting a custom orthotic and a new cane and is working with [facility]N and PCP on obtaining this.     Objective   NEURO EVALUATION:     OBSERVATION: [facility]    Mental Status: WFL at times appears impulsive without considering safety first, ie jumping out of chair with only one shoe on.     Integument: bilateral feet, integument intact and toenails clipped  appropriately. Did briefly discuss importance of checking integumentary frequently particularly given her deficits.     Posture:  Rounded shoulders, forward head     Palpation:  L calf hypertonicity and tension through L plantar fascia    Range of motion:   Lacking L ankle DF, AROM affected by LLE strength deficits  R ROM WNL    Sensation:  Intact     Coordination: difficulty understanding coordination testing, performs very slowly on R side and uses R hand to lift L arm to nose     Motor Function:  Muscle Strength (0-5 scale):  Upper Extremities: LUE grossly reduced. Pt is able to actively raise her L arm flx/abd but tends to use her R arm to assist  Lower Extremities:  Grossly reduced LLE. Hip flexion strength 2+/5    Bed mobility:  Independent     Transfers:   CGA     Wheelchair mobility:   N/a     Gait:  Observed gait pattern: Pt with increased difficulties with stability during turns. Decreased gait speed and inconsistent step length L>R. Pathway deviation and some reaching.   Gait distance: within the clinic, 50'  Level of assistance: CGA, gait belt donned  Use of assistive devices: quad cane, wearing AFO on L   Stairs: NT     Balance: poor -- will continue to assess    Functional Tests:  5x Sit-to-Stand: 25 seconds  Will cont to assess    Confrontation visual field test:   The writer is uncertain whether the patient's left-sided deficits are due to neglect or true visual impairment. Will cont to assess      Assessment & Plan   CLINICAL IMPRESSIONS  Medical Diagnosis: Multiple cerebral infarctions (H) (I63.9)    Alteration of sensations, post-stroke (I69.398, R20.9)    Left-sided weakness (R53.1)    Left foot pain (M79.672)    Treatment Diagnosis: Multiple cerebral infarctions (H) (I63.9)    Alteration of sensations, post-stroke (I69.398, R20.9)    Left-sided weakness (R53.1)    Left foot pain (M79.672); balance and gait impairments   Impression/Assessment: Patient is a 54 year old - year old female with  left-sided weakness and left foot pain s/p stroke .  The following significant findings have been identified: Pain, Decreased ROM/flexibility, Decreased joint mobility, Decreased strength, Impaired balance, Decreased proprioception, Impaired sensation, Impaired gait, Impaired muscle performance, Decreased activity tolerance, and Impaired posture. These impairments interfere with their ability to perform self care tasks, recreational activities, household mobility, and community mobility as compared to previous level of function. Pt is appropriate for skilled PT intervention.     Clinical Decision Making (Complexity):  Clinical Presentation: Stable/Uncomplicated  Clinical Presentation Rationale: based on medical and personal factors listed in PT evaluation  Clinical Decision Making (Complexity): Low complexity    PLAN OF CARE  Treatment Interventions:  Modalities: Biofeedback, Dry Needling, E-stim, Mechanical Traction, Ultrasound  Interventions: Gait Training, Manual Therapy, Neuromuscular Re-education, Therapeutic Activity, Therapeutic Exercise, Self-Care/Home Management    Long Term Goals     PT Goal 1  Goal Identifier: HEP  Goal Description: Pt will be independent with HEP for self-management of symptoms  Rationale: to maximize safety and independence within the home  Goal Progress: initial  Target Date: 02/10/25  PT Goal 2  Goal Identifier: SL balance  Goal Description: Improve single-leg stance balance for >5 seconds on left leg within 8 weeks by incorporating targeted balance exercises into the therapy program.  Rationale: to maximize safety and independence within the community  Goal Progress: initial  Target Date: 03/10/25  PT Goal 3  Goal Identifier: dynamic balance  Goal Description: Enhance dynamic balance by successfully completing basic functional tasks, such as reaching for objects while standing and stepping over low obstacles, in 90% of practice sessions within 12 weeks.  Rationale: to maximize safety  and independence with performance of ADLs and functional tasks  Goal Progress: initial  Target Date: 04/07/25  PT Goal 4  Goal Identifier: Gait  Goal Description: Pt will obtain new cane and custom orthotic and demonstrate a more normalized gait pattern, mod-I and navigate uneven surfaces and curb for community ambulation  Rationale: to maximize safety and independence within the community  Goal Progress: initial  Target Date: 04/12/25      Frequency of Treatment: 1x/week  Duration of Treatment: 90 days    Education Assessment:   Learner/Method: Patient  Education Comments: Education regarding mechanisms and rationale for physical therapy interventions selected to address their goals. Discussed self management strategies for ways patient can actively support progress towards goals. Education regarding activity precautions/restrictions. Addressed patients questions and concerns to their satisfaction prior to completion of visit.    Risks and benefits of evaluation/treatment have been explained.   Patient/Family/caregiver agrees with Plan of Care.     Evaluation Time:     PT Eval, Low Complexity Minutes (45038): 10     Signing Clinician: Josie Youssef, PT        [facility]                                                                                   OUTPATIENT PHYSICAL THERAPY      PLAN OF TREATMENT FOR OUTPATIENT REHABILITATION   Patient's Last Name, First Name, M.I.  Bi,Paw    YOB: 1970   Provider's Name   [facility]   Medical Record No.  [id number]     Onset Date: 01/07/25 (provider order)  Start of Care Date: 01/13/25     Medical Diagnosis:  Multiple cerebral infarctions (H) (I63.9)    Alteration of sensations, post-stroke (I69.398, R20.9)    Left-sided weakness (R53.1)    Left foot pain (M79.672)      PT Treatment Diagnosis:  Multiple cerebral infarctions (H) (I63.9)    Alteration of sensations, post-stroke (I69.398, R20.9)     Left-sided weakness (R53.1)    Left foot pain (M79.672); balance and gait impairments Plan of Treatment  Frequency/Duration: 1x/week/ 90 days    Certification date from 01/13/25 to 04/12/25         See note for plan of treatment details and functional goals     Josie Youssef, PT                         I CERTIFY THE NEED FOR THESE SERVICES FURNISHED UNDER        THIS PLAN OF TREATMENT AND WHILE UNDER MY CARE     (Physician attestation of this document indicates review and certification of the therapy plan).              Referring Provider:  Kristi Canada-Jessie    Initial Assessment  See Epic Evaluation- Start of Care Date: 01/13/25

## 2025-01-13 NOTE — PROGRESS NOTES
"Clinic Care Coordination Contact      Direct care provided in primary language, no  needed.     Follow Up Progress Note      Assessment: Per PCP to assist patient scheduled appointment orthotics for a custom orthotic left leg. [facility] spoke with patient briefly. She was on her way to her PT appointment. Patient agreed to schedule orthotics appointment. [facility] called to schedule appointment but had to leave a voicemail requesting a call back directly to [facility] or patient. New goal created today.     [facility], [address]   Phone: [phone]    Hi - podiatry ordered a custom orthotic for her left leg - there is an order under \"other orders,\" would you be able to assist her ins cheduling an appointment at [facility]? She also says she has not received her cane yet. Thank you!           Care Plans  Care Plan: PCA service       Problem: imparied mobility       Goal: Patient would like to explore if she could qualify for PCA service in the next 12 months.       Start Date: 7/16/2024 Expected End Date: 7/31/2025    This Visit's Progress: 60% Recent Progress: 50%    Note:     Barriers: language  Strengths: Willing to accept CCC support,   Patient expressed understanding of goal: Yes    Action steps to achieve this goal:  1. I will answer my phone when [facility]  calls to schedule an in-person assessment.   2. I will provide accurate information when needed.  3. I will update CCC team at outreach.     Notes: CHW placed referral to [facility] today, 8/26/2024 and wait time is another 1 to 2 more months and pending. Referral also place to [facility] today, 6/26/2024 and [facility] worker is helping to follow up.                            Care Plan: DME       Problem: impaired mobility       Goal: Patient would like to explore if she could qualify for a cane and incontinence supply in the next 90 days.       Start Date: 8/26/2024 Expected End Date: 12/31/2024    This Visit's Progress: " 50% Recent Progress: 40%    Note:     Barriers: language   Strengths: Accepting of support  Patient expressed understanding of goal: Yes    Action steps to achieve this goal:  1. I will take hard copy to [facility] with support from my [facility] worker in the next 30 days. Thida  patient has not received it yet.  2. I will follow up with CCC in the next month regarding this goal for additional coordination support.    Note: Patient received pull-ups last month and CHW requested another refills from [facility]. CHW unable to verified with [employer] both [employer] and [facility] if cane was issued from insurance.                             Care Plan: Orthopedics       Problem: left foot/ankle pain after stroke/ Foot Orthotics       Goal: General Goal - please update text                             Plan: CHW to follow up on orthotic appt status at upcoming outreach.

## 2025-01-14 ENCOUNTER — PATIENT OUTREACH (OUTPATIENT)
Dept: CARE COORDINATION | Facility: CLINIC | Age: 55
End: 2025-01-14

## 2025-01-14 ENCOUNTER — ALLIED HEALTH/NURSE VISIT (OUTPATIENT)
Dept: FAMILY MEDICINE | Facility: CLINIC | Age: 55
End: 2025-01-14
Payer: COMMERCIAL

## 2025-01-14 VITALS — DIASTOLIC BLOOD PRESSURE: 75 MMHG | SYSTOLIC BLOOD PRESSURE: 117 MMHG

## 2025-01-14 DIAGNOSIS — Z01.30 BP CHECK: Primary | ICD-10-CM

## 2025-01-14 PROCEDURE — 99207 PR NO CHARGE NURSE ONLY: CPT

## 2025-01-14 NOTE — PROGRESS NOTES
Rafael Colin is a 54 year old patient who comes in today for a Blood Pressure check.  Initial BP:  /75   LMP  (LMP Unknown)      Data Unavailable  Disposition: results routed to provider

## 2025-01-22 LAB — NONINV COLON CA DNA+OCC BLD SCRN STL QL: NEGATIVE

## 2025-01-27 NOTE — PROGRESS NOTES
Chief Complaint   Patient presents with    Ear Problem     Tinnitus in right ear     History of Present Illness  Rafael Colin is a 54 year old female who presents today for evaluation. The patient was referred by  for concerns of right tinnitus. The patient was seen back on 10/31/24 and it was noted that her left ear is deaf.     The patient has noted ringing on the right side. There is pressure on the right side and she is unable to hear very well.  It has been present and noticeable for approximately June 2024. The patient was working and she stopped working due to symptoms. She had a stroke and then the ringing started.  It was not sudden in onset.  The patient has noticed increased difficulty hearing certain sounds and difficulty in understanding others, especially in noisy or crowded situations.  There is no history chronic ear disease or ear surgery.  The patient reports exposure to no recreational, no , and work-related noise exposure.  No family history of hearing loss at a young age. No regular use of aspirin or NSAIDS.    HX of a growth removal near her left ear.     Past Medical History  Patient Active Problem List   Diagnosis    Hyperlipidemia LDL goal <130    Neck Cervical Mass Left (___cm)    Allergic rhinitis    Vitamin D Deficiency    Obesity    Deafness in left ear    Lumbar back pain    Left-sided weakness    Multiple cerebral infarctions (H)    Intracranial atherosclerosis    Prediabetes    Female stress incontinence    Essential hypertension    Alteration of sensations, post-stroke    Left foot pain    Upper back pain    Major depressive disorder, single episode, moderate (H)     Current Medications     Current Outpatient Medications:     acetaminophen (TYLENOL) 500 MG tablet, Take 1-2 tablets (500-1,000 mg) by mouth every 6 hours as needed for mild pain., Disp: 50 tablet, Rfl: 1    amitriptyline (ELAVIL) 10 MG tablet, Take 1 tablet (10 mg) by mouth at bedtime., Disp: 30 tablet, Rfl:  3    aspirin (ASA) 81 MG chewable tablet, Take 1 tablet (81 mg) by mouth daily., Disp: 90 tablet, Rfl: 3    atorvastatin (LIPITOR) 80 MG tablet, Take 1 tablet (80 mg) by mouth every evening., Disp: 90 tablet, Rfl: 3    cetirizine (ZYRTEC) 10 MG tablet, TAKE 1 TABLET (10 MG) BY MOUTH DAILY., Disp: 30 tablet, Rfl: 5    clopidogrel (PLAVIX) 75 MG tablet, Take 1 tablet (75 mg) by mouth daily., Disp: 90 tablet, Rfl: 3    gabapentin (NEURONTIN) 300 MG capsule, Take 1 capsule (300 mg) by mouth at bedtime., Disp: 30 capsule, Rfl: 3    losartan (COZAAR) 25 MG tablet, Take 1 tablet (25 mg) by mouth daily, Disp: 90 tablet, Rfl: 3    omeprazole (PRILOSEC) 20 MG DR capsule, Take 1 capsule (20 mg) by mouth daily as needed (abdominal pain)., Disp: 30 capsule, Rfl: 3    Allergies  Allergies   Allergen Reactions    Shellfish-Derived Products Anaphylaxis    Amoxicillin Rash    Contrave [Naltrexone-Bupropion Hcl Er] Unknown    Sulfamethoxazole-Trimethoprim [Sulfamethoxazole-Trimethoprim] Itching and Rash       Social History   Social History     Socioeconomic History    Marital status:    Tobacco Use    Smoking status: Never     Passive exposure: Never    Smokeless tobacco: Never   Vaping Use    Vaping status: Never Used   Substance and Sexual Activity    Alcohol use: No    Drug use: No    Sexual activity: Never   Social History Narrative    Mya.  [place]     Social Drivers of Health     Financial Resource Strain: Low Risk  (1/7/2025)    Financial Resource Strain     Within the past 12 months, have you or your family members you live with been unable to get utilities (heat, electricity) when it was really needed?: No   Food Insecurity: Low Risk  (1/7/2025)    Food Insecurity     Within the past 12 months, did you worry that your food would run out before you got money to buy more?: No     Within the past 12 months, did the food you bought just not last and you didn t have money to get more?: No   Transportation Needs:  Low Risk  (1/7/2025)    Transportation Needs     Within the past 12 months, has lack of transportation kept you from medical appointments, getting your medicines, non-medical meetings or appointments, work, or from getting things that you need?: No   Interpersonal Safety: Low Risk  (1/7/2025)    Interpersonal Safety     Do you feel physically and emotionally safe where you currently live?: Yes     Within the past 12 months, have you been hit, slapped, kicked or otherwise physically hurt by someone?: No     Within the past 12 months, have you been humiliated or emotionally abused in other ways by your partner or ex-partner?: No   Housing Stability: Low Risk  (1/7/2025)    Housing Stability     Do you have housing? : Yes     Are you worried about losing your housing?: No       Family History  Family History   Problem Relation Age of Onset    Breast Cancer No family hx of     Ovarian Cancer No family hx of        Review of Systems  As per HPI and PMHx, otherwise 10+ comprehensive system review is negative.    Physical Exam  LMP  (LMP Unknown)   GENERAL: Patient is a pleasant, cooperative 54 year old female in no acute distress.  HEAD: Normocephalic, atraumatic.  Hair and scalp are normal.  EYES: Pupils are equal, round, reactive to light and accommodation.  Extraocular movements are intact.  The sclera nonicteric without injection.  The extraocular structures are normal.  EARS: Normal shape and symmetry.  No tenderness when palpating the mastoid or tragal areas bilaterally.  Otoscopic exam reveals no amount of cerumen bilaterally.  The bilateral tympanic membranes are round, intact without evidence of effusion, good landmarks.  No retraction, granulation, or drainage.  NOSE: Nares are patent.  Nasal mucosa is pink.  ORAL CAVITY: Dentition is in good repair.  Mucous membranes are moist.  Tongue is mobile, protrudes to the midline.  Palate elevates symmetrically.  Tonsils are +1.  No erythema or exudate.  No oral cavity  or oropharyngeal masses, lesions, ulcerations, leukoplakia.  NECK: Supple, trachea is midline.  There no palpable cervical lymphadenopathy or masses bilaterally.  Palpation of the bilateral parotid and submandibular areas reveal no masses.  No thyromegaly.    NEUROLOGIC: Cranial nerves II through XII are grossly intact.  Voice is strong.  Patient is House-Brackmann I/VI bilaterally.  CARDIOVASCULAR: Extremities are warm and well-perfused.  No significant peripheral edema.  RESPIRATORY: Patient has nonlabored breathing without cough, wheeze, stridor.  PSYCHIATRIC: Patient is alert and oriented.  Mood and affect appear normal.  SKIN: Warm and dry.  No scalp, face, or neck lesions noted.    Audiogram  The patient underwent an audiogram performed today.  My review of the audiogram shows normal hearing through 1000 HZ then moderate SNHL in the right ear and moderate and profound SNHL.  Pure-tone average is 26 dB on the right and 78 dB on the left.  Speech reception threshold is 30 dB on the right and 90 dB on the left.  The patient had NA word recognition on the right and NA word recognition on the left.  The patient had a AS tympanogram on the right and a AS tympanogram on the left.     Assessment and Plan     ICD-10-CM    1. SNHL (sensory-neural hearing loss), asymmetrical  H90.3 Adult ENT  Referral     MR Brain w/o & w Contrast      2. Antibiotic-induced tinnitus of right ear  H93.11     T36.95XA       3. Profound hearing loss of left ear  H91.92 Adult ENT  Referral      4. Tinnitus, right  H93.11 Adult ENT  Referral      5. Disability examination  Z02.71 Primary Care - Care Coordination Referral        It was my pleasure seeing Rafael Colin today in clinic for tinnitus. We discussed her hearing test at today's visit. We also discussed the pathophysiology of a conductive verses a sensorineural hearing loss. The tinnitus she is experiencing is most likely related to the SNHL. We did order a MRI to  rule out a vestibular neuroma, which I will call her with results. She would like to discuss a cochlear device in the future, therefore a ENT referral was placed to the [facility].     She has been through a lot since 2024 and was asking about getting on disability and other resources. I did place a referral to a  to help her with those things.       ROMERO Willams CNP  Otolaryngology  [place] [facility] [place]

## 2025-01-28 ENCOUNTER — PATIENT OUTREACH (OUTPATIENT)
Dept: CARE COORDINATION | Facility: CLINIC | Age: 55
End: 2025-01-28
Payer: COMMERCIAL

## 2025-01-28 NOTE — PROGRESS NOTES
Clinic Care Coordination Contact  [facility]/Voicemail    Clinical Data: Care Coordinator Outreach    Outreach Documentation Number of Outreach Attempt   1/28/2025   2:12 PM 1     Unable to leave a message due to: Voicemail is not set up.    Plan: Care Coordinator will try to reach patient again in two weeks.

## 2025-01-29 ENCOUNTER — OFFICE VISIT (OUTPATIENT)
Dept: OTOLARYNGOLOGY | Facility: CLINIC | Age: 55
End: 2025-01-29
Attending: FAMILY MEDICINE
Payer: COMMERCIAL

## 2025-01-29 ENCOUNTER — OFFICE VISIT (OUTPATIENT)
Dept: AUDIOLOGY | Facility: CLINIC | Age: 55
End: 2025-01-29
Attending: FAMILY MEDICINE
Payer: COMMERCIAL

## 2025-01-29 VITALS
SYSTOLIC BLOOD PRESSURE: 129 MMHG | HEIGHT: 56 IN | WEIGHT: 146 LBS | HEART RATE: 58 BPM | BODY MASS INDEX: 32.84 KG/M2 | OXYGEN SATURATION: 98 % | DIASTOLIC BLOOD PRESSURE: 79 MMHG

## 2025-01-29 DIAGNOSIS — Z02.71 DISABILITY EXAMINATION: ICD-10-CM

## 2025-01-29 DIAGNOSIS — H93.11 ANTIBIOTIC-INDUCED TINNITUS OF RIGHT EAR: ICD-10-CM

## 2025-01-29 DIAGNOSIS — H91.92 PROFOUND HEARING LOSS OF LEFT EAR: ICD-10-CM

## 2025-01-29 DIAGNOSIS — H90.3 SNHL (SENSORY-NEURAL HEARING LOSS), ASYMMETRICAL: Primary | ICD-10-CM

## 2025-01-29 DIAGNOSIS — T36.95XA ANTIBIOTIC-INDUCED TINNITUS OF RIGHT EAR: ICD-10-CM

## 2025-01-29 DIAGNOSIS — H90.3 SENSORINEURAL HEARING LOSS, ASYMMETRICAL: Primary | ICD-10-CM

## 2025-01-29 DIAGNOSIS — H93.11 TINNITUS, RIGHT: ICD-10-CM

## 2025-01-29 PROCEDURE — 92565 STENGER TEST PURE TONE: CPT | Performed by: AUDIOLOGIST

## 2025-01-29 PROCEDURE — 92550 TYMPANOMETRY & REFLEX THRESH: CPT | Performed by: AUDIOLOGIST

## 2025-01-29 PROCEDURE — 92553 AUDIOMETRY AIR & BONE: CPT | Performed by: AUDIOLOGIST

## 2025-01-29 NOTE — PATIENT INSTRUCTIONS
You were seen by Marisel Maxwell CNP.  If you have questions or concerns regarding your appointment today, you can reach out to our call center at [phone].  The following has been recommended at your appointment today:    Schedule with a .   Schedule MRI and I will reach out with results.   Schedule with ENT down at the [facility].       TINNITUS EDUCATION:     Monitor salt, caffeine, alcohol, dark chocolate intake. Decrease intake to see if symptoms improve.   Decrease use of Aspirin, IBU, and Aleve.   Threshold Shift - Experiencing a threshold shift causes damage to the hair cells in your ears. When they are damaged instead of standing up they remain bend over, even if there is sound. When hair cells remain bent over it can cause a felling of fullness, ringing in the ear or possible temporary hearing loss.   Continue with meditation to help maintain stress, anxiety, or depression symptoms.   May use a sound machine, fan, or sound tristan on your smart phone during sleep. (Called white noise)  [employer] - Sound [place]  Cognitive Behavioral Therapy - Send me a [employer] message if you are interested in this.   OTC Medications you could try include Arches Tinnitus Formula or Lipo Flavonoid.   Could order an MRI for further evaluation to make sure there is nothing growing on the hearing nerve. However, your word recognition is currently at 100%.

## 2025-01-29 NOTE — PROGRESS NOTES
AUDIOLOGY REPORT:    Patient was referred to [facility] from ENT by Marisel JASSO CNP for a hearing examination. Patient reports tinnitus in her right ear for the past six months, since her stroke. Patient reports some surgery on her left ear when he was [age] for some sort of growth removal. iPad  Dane #[id number] assisted with translation for today's appointment.     Testing:    Otoscopy:   Otoscopic exam indicates ears are clear of cerumen bilaterally     Tympanograms:    RIGHT: restricted eardrum mobility [Type As]     LEFT:   restricted eardrum mobility [Type As]    Reflexes (reported by stimulus ear): 1000 Hz  RIGHT: Ipsilateral is absent at frequencies tested  RIGHT: Contralateral is absent at frequencies tested  LEFT:   Ipsilateral is absent at frequencies tested  LEFT:   Contralateral is absent at frequencies tested    Thresholds:   Pure Tone Thresholds assessed using standard techniques audiometry with fair to good  reliability from 250-8000 Hz bilaterally using insert earphones and circumaural headphones     RIGHT:  normal hearing sensitivity through 1000 Hz then a mild to moderate sensorineural hearing loss    LEFT:    moderate and profound primarily sensorineural hearing loss   NOTE: Change in transducers did not merit a change in thresholds.     Verito: Negative     Speech Detection Threshold:    RIGHT: 30 dB HL    LEFT:   90 dB HL    Word Recognition Score:    Could not test due to lack of in person .     Discussed results with the patient.     Patient was returned to ENT for follow up.     Grover Alvarado CCC-A  Licensed Audiologist  1/29/2025

## 2025-01-29 NOTE — NURSING NOTE
"Chief Complaint   Patient presents with    Ear Problem     Tinnitus in right ear       Vitals:    01/29/25 0925   BP: 129/79   Pulse: 58   SpO2: 98%   Height: 1.429 m (4' 8.25\")     Wt Readings from Last 1 Encounters:   01/07/25 66.3 kg (146 lb 1.9 oz)       Fawn Plascencia MA     ID#[id number] (Used for Hearing and Rooming)  "

## 2025-01-29 NOTE — LETTER
1/29/2025      Rafael Colin  [address]  [place] [address]      Dear Colleague,    Thank you for referring your patient, Rafael Colin, to the [facility]. Please see a copy of my visit note below.    Chief Complaint   Patient presents with     Ear Problem     Tinnitus in right ear     History of Present Illness  Rafael Colin is a 54 year old female who presents today for evaluation. The patient was referred by  for concerns of right tinnitus. The patient was seen back on 10/31/24 and it was noted that her left ear is deaf.     The patient has noted ringing on the right side. There is pressure on the right side and she is unable to hear very well.  It has been present and noticeable for approximately June 2024. The patient was working and she stopped working due to symptoms. She had a stroke and then the ringing started.  It was not sudden in onset.  The patient has noticed increased difficulty hearing certain sounds and difficulty in understanding others, especially in noisy or crowded situations.  There is no history chronic ear disease or ear surgery.  The patient reports exposure to no recreational, no , and work-related noise exposure.  No family history of hearing loss at a young age. No regular use of aspirin or NSAIDS.    HX of a growth removal near her left ear.     Past Medical History  Patient Active Problem List   Diagnosis     Hyperlipidemia LDL goal <130     Neck Cervical Mass Left (___cm)     Allergic rhinitis     Vitamin D Deficiency     Obesity     Deafness in left ear     Lumbar back pain     Left-sided weakness     Multiple cerebral infarctions (H)     Intracranial atherosclerosis     Prediabetes     Female stress incontinence     Essential hypertension     Alteration of sensations, post-stroke     Left foot pain     Upper back pain     Major depressive disorder, single episode, moderate (H)     Current Medications     Current Outpatient Medications:       acetaminophen (TYLENOL) 500 MG tablet, Take 1-2 tablets (500-1,000 mg) by mouth every 6 hours as needed for mild pain., Disp: 50 tablet, Rfl: 1     amitriptyline (ELAVIL) 10 MG tablet, Take 1 tablet (10 mg) by mouth at bedtime., Disp: 30 tablet, Rfl: 3     aspirin (ASA) 81 MG chewable tablet, Take 1 tablet (81 mg) by mouth daily., Disp: 90 tablet, Rfl: 3     atorvastatin (LIPITOR) 80 MG tablet, Take 1 tablet (80 mg) by mouth every evening., Disp: 90 tablet, Rfl: 3     cetirizine (ZYRTEC) 10 MG tablet, TAKE 1 TABLET (10 MG) BY MOUTH DAILY., Disp: 30 tablet, Rfl: 5     clopidogrel (PLAVIX) 75 MG tablet, Take 1 tablet (75 mg) by mouth daily., Disp: 90 tablet, Rfl: 3     gabapentin (NEURONTIN) 300 MG capsule, Take 1 capsule (300 mg) by mouth at bedtime., Disp: 30 capsule, Rfl: 3     losartan (COZAAR) 25 MG tablet, Take 1 tablet (25 mg) by mouth daily, Disp: 90 tablet, Rfl: 3     omeprazole (PRILOSEC) 20 MG DR capsule, Take 1 capsule (20 mg) by mouth daily as needed (abdominal pain)., Disp: 30 capsule, Rfl: 3    Allergies  Allergies   Allergen Reactions     Shellfish-Derived Products Anaphylaxis     Amoxicillin Rash     Contrave [Naltrexone-Bupropion Hcl Er] Unknown     Sulfamethoxazole-Trimethoprim [Sulfamethoxazole-Trimethoprim] Itching and Rash       Social History   Social History     Socioeconomic History     Marital status:    Tobacco Use     Smoking status: Never     Passive exposure: Never     Smokeless tobacco: Never   Vaping Use     Vaping status: Never Used   Substance and Sexual Activity     Alcohol use: No     Drug use: No     Sexual activity: Never   Social History Narrative    Mya.  [place] 2008     Social Drivers of Health     Financial Resource Strain: Low Risk  (1/7/2025)    Financial Resource Strain      Within the past 12 months, have you or your family members you live with been unable to get utilities (heat, electricity) when it was really needed?: No   Food Insecurity: Low Risk   (1/7/2025)    Food Insecurity      Within the past 12 months, did you worry that your food would run out before you got money to buy more?: No      Within the past 12 months, did the food you bought just not last and you didn t have money to get more?: No   Transportation Needs: Low Risk  (1/7/2025)    Transportation Needs      Within the past 12 months, has lack of transportation kept you from medical appointments, getting your medicines, non-medical meetings or appointments, work, or from getting things that you need?: No   Interpersonal Safety: Low Risk  (1/7/2025)    Interpersonal Safety      Do you feel physically and emotionally safe where you currently live?: Yes      Within the past 12 months, have you been hit, slapped, kicked or otherwise physically hurt by someone?: No      Within the past 12 months, have you been humiliated or emotionally abused in other ways by your partner or ex-partner?: No   Housing Stability: Low Risk  (1/7/2025)    Housing Stability      Do you have housing? : Yes      Are you worried about losing your housing?: No       Family History  Family History   Problem Relation Age of Onset     Breast Cancer No family hx of      Ovarian Cancer No family hx of        Review of Systems  As per HPI and PMHx, otherwise 10+ comprehensive system review is negative.    Physical Exam  LMP  (LMP Unknown)   GENERAL: Patient is a pleasant, cooperative 54 year old female in no acute distress.  HEAD: Normocephalic, atraumatic.  Hair and scalp are normal.  EYES: Pupils are equal, round, reactive to light and accommodation.  Extraocular movements are intact.  The sclera nonicteric without injection.  The extraocular structures are normal.  EARS: Normal shape and symmetry.  No tenderness when palpating the mastoid or tragal areas bilaterally.  Otoscopic exam reveals no amount of cerumen bilaterally.  The bilateral tympanic membranes are round, intact without evidence of effusion, good landmarks.  No  retraction, granulation, or drainage.  NOSE: Nares are patent.  Nasal mucosa is pink.  ORAL CAVITY: Dentition is in good repair.  Mucous membranes are moist.  Tongue is mobile, protrudes to the midline.  Palate elevates symmetrically.  Tonsils are +1.  No erythema or exudate.  No oral cavity or oropharyngeal masses, lesions, ulcerations, leukoplakia.  NECK: Supple, trachea is midline.  There no palpable cervical lymphadenopathy or masses bilaterally.  Palpation of the bilateral parotid and submandibular areas reveal no masses.  No thyromegaly.    NEUROLOGIC: Cranial nerves II through XII are grossly intact.  Voice is strong.  Patient is House-Brackmann I/VI bilaterally.  CARDIOVASCULAR: Extremities are warm and well-perfused.  No significant peripheral edema.  RESPIRATORY: Patient has nonlabored breathing without cough, wheeze, stridor.  PSYCHIATRIC: Patient is alert and oriented.  Mood and affect appear normal.  SKIN: Warm and dry.  No scalp, face, or neck lesions noted.    Audiogram  The patient underwent an audiogram performed today.  My review of the audiogram shows normal hearing through 1000 HZ then moderate SNHL in the right ear and moderate and profound SNHL.  Pure-tone average is 26 dB on the right and 78 dB on the left.  Speech reception threshold is 30 dB on the right and 90 dB on the left.  The patient had NA word recognition on the right and NA word recognition on the left.  The patient had a AS tympanogram on the right and a AS tympanogram on the left.     Assessment and Plan     ICD-10-CM    1. SNHL (sensory-neural hearing loss), asymmetrical  H90.3 Adult ENT  Referral     MR Brain w/o & w Contrast      2. Antibiotic-induced tinnitus of right ear  H93.11     T36.95XA       3. Profound hearing loss of left ear  H91.92 Adult ENT  Referral      4. Tinnitus, right  H93.11 Adult ENT  Referral      5. Disability examination  Z02.71 Primary Care - Care Coordination Referral         It was my pleasure seeing Rafael Colin today in clinic for tinnitus. We discussed her hearing test at today's visit. We also discussed the pathophysiology of a conductive verses a sensorineural hearing loss. The tinnitus she is experiencing is most likely related to the SNHL. We did order a MRI to rule out a vestibular neuroma, which I will call her with results. She would like to discuss a cochlear device in the future, therefore a ENT referral was placed to the [facility].     She has been through a lot since 2024 and was asking about getting on disability and other resources. I did place a referral to a  to help her with those things.       ROMERO Willams CNP  Otolaryngology  [place] [facility] [place]      Again, thank you for allowing me to participate in the care of your patient.        Sincerely,        ROMERO Willams CNP    Electronically signed

## 2025-02-03 NOTE — TELEPHONE ENCOUNTER
FUTURE VISIT INFORMATION      FUTURE VISIT INFORMATION:  Date: 2/21/25  Time: 8:00am  [place] [facility]  REFERRAL INFORMATION:  Referring provider:  Marisel Maxwell APRN CNP   Referring providers clinic:  [facility]  Reason for visit/diagnosis  CIE    RECORDS REQUESTED FROM:       Clinic name Comments Records Status Imaging Status   [facility] Ov/referral 1/29/25 [facility] Audio Audio 1/29/25 [facility]

## 2025-02-04 ENCOUNTER — PATIENT OUTREACH (OUTPATIENT)
Dept: CARE COORDINATION | Facility: CLINIC | Age: 55
End: 2025-02-04
Payer: COMMERCIAL

## 2025-02-04 NOTE — PROGRESS NOTES
Clinic Care Coordination Contact  Community Health Worker Follow Up    Care Gaps:   Health Maintenance Due   Topic Date Due    ADVANCE CARE PLANNING  Never done    DEPRESSION ACTION PLAN  Never done    MAMMO SCREENING  02/10/2025     Scheduled on 2/18/2025.      Care Plan:   Care Plan: PCA service       Problem: imparied mobility       Goal: Patient would like to explore if she could qualify for PCA service in the next 12 months.       Start Date: 7/16/2024 Expected End Date: 7/31/2025    This Visit's Progress: 70% Recent Progress: 60%    Note:     Barriers: language  Strengths: Willing to accept CCC support,   Patient expressed understanding of goal: Yes    Action steps to achieve this goal:  1. I will answer my phone when [facility]  calls to schedule an in-person assessment.   2. I will provide accurate information when needed.  3. I will update CCC team at outreach.     Notes: CHW placed referral to [facility] today, 8/26/2024 and wait time is another 4 to 6 more weeks. Was on the phone with patient and [employer] [facility] for 45 minutes and completed intake and patient is scheduled for another level of screening on 2/12/2025 at 12:15 PM.                             Care Plan: DME       Problem: impaired mobility       Goal: Patient would like to explore if she could qualify for a cane and incontinence supply in the next 90 days.       Start Date: 8/26/2024 Expected End Date: 12/31/2024    This Visit's Progress: 60% Recent Progress: 50%    Note:     Barriers: language   Strengths: Accepting of support  Patient expressed understanding of goal: Yes    Action steps to achieve this goal:  1. I will take hard copy to [facility] with support from my arm worker in the next 30 days. Mis  patient has not received it yet.  2. I will follow up with [facility] in the next month regarding this goal for additional coordination support.    Note: [facility] will drop off care to patient's home.                            Intervention and Education during outreach:  -CHW arranged transportation for all upcoming appointments and reminded patient.  -CHW assisted patient applied for SSI by conferencing call and completed intake which took about 45 minutes. Patient is scheduled for another level of screen with [employer] on 2/12/2025 at 12:15 PM to determine if patient will qualify or if [employer] can represent patient due to have saving account.   -Patient was informed to call with questions or concerns.     CHW Next Outreach: In one month.

## 2025-02-10 ENCOUNTER — THERAPY VISIT (OUTPATIENT)
Dept: PHYSICAL THERAPY | Facility: REHABILITATION | Age: 55
End: 2025-02-10
Payer: COMMERCIAL

## 2025-02-10 DIAGNOSIS — M79.672 LEFT FOOT PAIN: ICD-10-CM

## 2025-02-10 DIAGNOSIS — I63.9 MULTIPLE CEREBRAL INFARCTIONS (H): Primary | ICD-10-CM

## 2025-02-10 DIAGNOSIS — I69.398 ALTERATION OF SENSATIONS, POST-STROKE: ICD-10-CM

## 2025-02-10 DIAGNOSIS — R53.1 LEFT-SIDED WEAKNESS: ICD-10-CM

## 2025-02-10 DIAGNOSIS — R20.9 ALTERATION OF SENSATIONS, POST-STROKE: ICD-10-CM

## 2025-02-10 PROCEDURE — 97110 THERAPEUTIC EXERCISES: CPT | Mod: GP

## 2025-02-10 PROCEDURE — 97112 NEUROMUSCULAR REEDUCATION: CPT | Mod: GP

## 2025-02-10 PROCEDURE — T1013 SIGN LANG/ORAL INTERPRETER: HCPCS

## 2025-02-10 PROCEDURE — 97140 MANUAL THERAPY 1/> REGIONS: CPT | Mod: GP

## 2025-02-12 ENCOUNTER — PATIENT OUTREACH (OUTPATIENT)
Dept: NURSING | Facility: CLINIC | Age: 55
End: 2025-02-12
Payer: COMMERCIAL

## 2025-02-12 NOTE — PROGRESS NOTES
Clinic Care Coordination Contact  Care Coordination Clinician Chart Review    Situation: Patient chart reviewed by Care Coordinator.       Background: Care Coordination Program started: 6/20/2024. Initial assessment completed and patient-centered care plan(s) were developed with participation from patient. Lead CC handed patient off to CHW for continued outreaches.       Assessment: Per chart review, patient outreach completed by CC CHW on 2/4/25.  Patient is actively working to accomplish goal(s). Patient's goal(s) appropriate and relevant at this time. Patient is not due for updated Plan of Care.  Assessments will be completed annually or as needed/with change of patient status.    Josie Youssef, PT  Rober, Lona, RN  Brittni Zamorano,    I'm hoping you can help me with Paw. I'm seeing her for left-sided weakness, left leg pain and balance. She is at times a poor historian, but she's reporting that she has not received her new cane or custom orthotic which I think would be of great use to her.    Per chart review I know you've made several attempts. Today I gave her a sheet of paper to give to her PCAs, to call the Orthotics & Prosthetics clinic, however I'm not confident that will land in their hands. Wondering how I can assist, let me know your thoughts.    Thank you,  Josie PENN - cane  Unable to reach patient today to follow up on her cane status.     Orthotics and Prosthetics  Unable to reach patient today to follow up. CHW to assist patient at next outreach. Moved CHW outreach from 3/10/25 to 3/12/25        Care Plan: PCA service       Problem: imparied mobility       Goal: Patient would like to explore if she could qualify for PCA service in the next 12 months.       Start Date: 7/16/2024 Expected End Date: 7/31/2025    This Visit's Progress: 70% Recent Progress: 60%    Note:     Barriers: language  Strengths: Willing to accept CCC support,   Patient expressed understanding of goal: Yes    Action steps to  achieve this goal:  1. I will answer my phone when [facility]  calls to schedule an in-person assessment.   2. I will provide accurate information when needed.  3. I will update CCC team at outreach.     Notes: CHW placed referral to [facility] today, 8/26/2024 and wait time is another 4 to 6 more weeks. Was on the phone with patient and Dis[facility]ists for 45 minutes and completed intake and patient is scheduled for another level of screening on 2/12/2025 at 12:15 PM.                             Care Plan: DME       Problem: impaired mobility       Goal: Patient would like to explore if she could qualify for a cane and incontinence supply in the next 90 days.       Start Date: 8/26/2024 Expected End Date: 12/31/2024    This Visit's Progress: 60% Recent Progress: 50%    Note:     Barriers: language   Strengths: Accepting of support  Patient expressed understanding of goal: Yes    Action steps to achieve this goal:  1. I will take hard copy to [facility] with support from my arm worker in the next 30 days. Thida  patient has not received it yet.  2. I will follow up with CCC in the next month regarding this goal for additional coordination support.    Note: [facility] will drop off care to patient's home.                                  Plan/Recommendations: The patient will continue working with Care Coordination to achieve goal(s) as above. CHW will continue outreaches at minimum every 30 days and will involve Lead CC as needed or if patient is ready to move to Maintenance. Lead CC will continue to monitor CHW outreaches and patient's progress to goal(s) every 6 weeks.     Plan of Care updated and sent to patient: No

## 2025-02-13 ENCOUNTER — TELEPHONE (OUTPATIENT)
Dept: AUDIOLOGY | Facility: CLINIC | Age: 55
End: 2025-02-13
Payer: COMMERCIAL

## 2025-02-13 DIAGNOSIS — H90.3 SNHL (SENSORY-NEURAL HEARING LOSS), ASYMMETRICAL: Primary | ICD-10-CM

## 2025-02-13 NOTE — TELEPHONE ENCOUNTER
LVM for daughter via Claudia  stating that hearing test was needed for appts 2/21 new start time of 7:30am. In-person  will also be needed per audiology, message created on appt desk (writer can call language services to confirm).    Writer messaged provider who submitted audiology referrals for questions related to care/transportation coordination.    Trudy, CI Coordinator 2/13/25

## 2025-02-13 NOTE — PROGRESS NOTES
Reviewed results or imaging.   Called  services at [phone] to call with results.     Discussed how there is some atrophy on the left mastoid wall, which most likely has been present for awhile. Therefore, she may not be a good candidate for a cochlear implant. We did discuss hearing aids and she is interested in hearing aids, therefore I placed a referral for a hearing aid consult.     I also informed her to reach out to her PCP to have them place a referral for a .       ROMERO Willams [facility]  Otolaryngology  [facility]

## 2025-02-18 ENCOUNTER — VIRTUAL VISIT (OUTPATIENT)
Dept: FAMILY MEDICINE | Facility: CLINIC | Age: 55
End: 2025-02-18
Payer: COMMERCIAL

## 2025-02-18 DIAGNOSIS — I71.21 ANEURYSM OF ASCENDING AORTA WITHOUT RUPTURE: ICD-10-CM

## 2025-02-18 DIAGNOSIS — F32.1 MAJOR DEPRESSIVE DISORDER, SINGLE EPISODE, MODERATE (H): Primary | ICD-10-CM

## 2025-02-18 DIAGNOSIS — R06.00 DYSPNEA, UNSPECIFIED TYPE: ICD-10-CM

## 2025-02-18 DIAGNOSIS — M79.672 LEFT FOOT PAIN: ICD-10-CM

## 2025-02-18 DIAGNOSIS — R41.9 COGNITIVE COMPLAINTS: ICD-10-CM

## 2025-02-18 DIAGNOSIS — I63.9 MULTIPLE CEREBRAL INFARCTIONS (H): ICD-10-CM

## 2025-02-18 DIAGNOSIS — H57.9 ITCHY EYES: ICD-10-CM

## 2025-02-18 PROCEDURE — 96127 BRIEF EMOTIONAL/BEHAV ASSMT: CPT | Mod: 93 | Performed by: FAMILY MEDICINE

## 2025-02-18 PROCEDURE — T1013 SIGN LANG/ORAL INTERPRETER: HCPCS | Mod: U4

## 2025-02-18 PROCEDURE — 98015 SYNCH AUDIO-ONLY EST HIGH 40: CPT | Performed by: FAMILY MEDICINE

## 2025-02-18 ASSESSMENT — COLUMBIA-SUICIDE SEVERITY RATING SCALE - C-SSRS
6. HAVE YOU EVER DONE ANYTHING, STARTED TO DO ANYTHING, OR PREPARED TO DO ANYTHING TO END YOUR LIFE?: NO
2. IN THE PAST MONTH, HAVE YOU ACTUALLY HAD ANY THOUGHTS OF KILLING YOURSELF?: NO
1. WITHIN THE PAST MONTH, HAVE YOU WISHED YOU WERE DEAD OR WISHED YOU COULD GO TO SLEEP AND NOT WAKE UP?: YES

## 2025-02-18 ASSESSMENT — PATIENT HEALTH QUESTIONNAIRE - PHQ9
SUM OF ALL RESPONSES TO PHQ QUESTIONS 1-9: 21
10. IF YOU CHECKED OFF ANY PROBLEMS, HOW DIFFICULT HAVE THESE PROBLEMS MADE IT FOR YOU TO DO YOUR WORK, TAKE CARE OF THINGS AT HOME, OR GET ALONG WITH OTHER PEOPLE: VERY DIFFICULT
SUM OF ALL RESPONSES TO PHQ QUESTIONS 1-9: 21

## 2025-02-18 NOTE — Clinical Note
Specialty scheduling - would you be able to help patient schedule CTA, PFTs, eye referral, neuropsych referral (TCCPW) and OT? Thank you!

## 2025-02-18 NOTE — PROGRESS NOTES
Rafael is a 54 year old who is being evaluated via a billable telephone visit.    What phone number would you like to be contacted at? [phone]   How would you like to obtain your AVS? Mail a copy  Originating Location (pt. Location): Home    Distant Location (provider location):  On-site  Telephone visit completed due to the patient did not have access to video, while the distant provider did.    Assessment & Plan     Major depressive disorder, single episode, moderate (H)  Endorses continued depressive symptoms with passive SI without plan or intent. Increase amitriptyline.  - amitriptyline (ELAVIL) 25 MG tablet; Take 1 tablet (25 mg) by mouth at bedtime.  - Adult Mental Health  Referral; Future    Multiple cerebral infarctions (H)  Cognitive complaints  Reports worsening memory since stroke. Refer for neuropsych testing and OT.  - Adult Mental Health  Referral; Future  - Occupational Therapy  Referral; Future      Left foot pain  Has cane now and reports she has an appt for orthotics fitting.    Itchy eyes  Would like to see eye doctor.  - Adult Eye  Referral; Future    Dyspnea, unspecified type  She has had some intermittent dyspnea which resolves on its own. Had a CXR a few years ago that was normal. Will start with PFTs.  - General PFT Lab (Please always keep checked); Future  - Pulmonary Function Test; Future    Aneurysm of ascending aorta without rupture  Noted on review of echo done at time of stroke that aorta was 4.0cm in diameter. Will get CTA. No valvular disease.  - CTA Chest with Contrast; Future          Depression Screening Follow Up        2/18/2025     2:37 PM   PHQ   PHQ-9 Total Score 21    Q9: Thoughts of better off dead/self-harm past 2 weeks Several days    F/U: Thoughts of suicide or self-harm No    F/U: Safety concerns No        Proxy-reported         2/18/2025     2:37 PM   Last PHQ-9   1.  Little interest or pleasure in doing things 2    2.  Feeling  down, depressed, or hopeless 3    3.  Trouble falling or staying asleep, or sleeping too much 3    4.  Feeling tired or having little energy 3    5.  Poor appetite or overeating 1    6.  Feeling bad about yourself 2    7.  Trouble concentrating 3    8.  Moving slowly or restless 3    Q9: Thoughts of better off dead/self-harm past 2 weeks 1    PHQ-9 Total Score 21    In the past two weeks have you had thoughts of suicide or self harm? No    Do you have concerns about your personal safety or the safety of others? No        Proxy-reported               2/18/2025     3:11 PM   C-SSRS (Brief [place])   Within the last month, have you wished you were dead or wished you could go to sleep and not wake up? Yes   Within the last month, have you had any actual thoughts of killing yourself? No   Within the last month, have you ever done anything, started to do anything, or prepared to do anything to end your life? No               Follow Up Actions Taken  Crisis resource information provided in the After Visit Summary    Discussed the following ways the patient can remain in a safe environment:  be around others        Sourav Hall is a 54 year old, presenting for the following health issues:  Follow Up (Epigastric pain leg pain ( left leg is more painfully ever since the stroke ))      2/18/2025     2:37 PM   Additional Questions   Roomed by JIMENEZ Gonzalez   Accompanied by self     History of Present Illness       Reason for visit:  Follow up                Review of Systems  Constitutional, HEENT, cardiovascular, pulmonary, gi and gu systems are negative, except as otherwise noted.      Objective       Ear where she had surgery has been having throbbing pain - surgery was when she was resettling; this was in [facility]  Did some imaging on the ear  Just started this month - has been taking acetaminophen  Right side has ringing    Has been massaging and using cream on left leg, still going to PT  Has received cane  Does have  an appointment set up for new orthotic    Depression  - has a lot of things on her mind  - she does not have anyone to take care of her, she lives alone and worries she will die on her own in her apartment    Has noticed trouble with memory since having stroke    Wants someone to look at her lungs - she feels like occasionally she gets tightness in her lungs, she will just lay down and wait until it goes by  Has happened for a few years but it is on and off    Has an [employer] worker  No PCA - was told no one wanted to care for her      Vitals:  No vitals were obtained today due to virtual visit.    Physical Exam   General: Alert and no distress //Respiratory: No audible wheeze, cough, or shortness of breath // Psychiatric:  Appropriate affect, tone, and pace of words            Phone call duration: 42 minutes  Signed Electronically by: Kristi Adams MD

## 2025-02-19 NOTE — TELEPHONE ENCOUNTER
Spoke with patient via Claudia . Informed pt that hearing test was added in addition to cochlear implant eval for this Friday. Notified of new arrival time and confirmed she would need to reach out to arrange transportation for this time. She agreed to work with Claudia   with 'Martin' who can arrange transportation (possibly clinic or care coordinator - that was unclear).     Pt asked if  was available for appointment - confirmed. Writer called Language Services to confirm in-person  is scehduled. Gave writer's callback # if Martin needed to get in touch for info.     Trudy CI Coordinator 2/19/25

## 2025-02-20 ENCOUNTER — PATIENT OUTREACH (OUTPATIENT)
Dept: NURSING | Facility: CLINIC | Age: 55
End: 2025-02-20
Payer: COMMERCIAL

## 2025-02-20 ENCOUNTER — TELEPHONE (OUTPATIENT)
Dept: OTOLARYNGOLOGY | Facility: CLINIC | Age: 55
End: 2025-02-20

## 2025-02-20 DIAGNOSIS — Z02.71 DISABILITY EXAMINATION: Primary | ICD-10-CM

## 2025-02-20 NOTE — PROGRESS NOTES
Clinic Care Coordination Contact      Direct care provided in primary language, no  needed.    Follow Up Progress Note      Assessment: CCRN spoke with patient via phone. Educated patient the important of answering her phone or return calls in a timely manner to prevent getting her cares and attend her appts as scheduled. Patient verbalized understanding.     CHW delegations:  - assist with eye appt if not scheduled yet. Referral was faxed to [facility]   - Assist with OT appt  - check on TCCPW appt status.  - Pt attend orthotics appt on 2/26/25 ? Follow up appt scehduled?    Orthotics - left foot pain  CCRN assisted patient scheduled orthotics appointment on 2/26/25 at 10:00am. CHW to set up ride. New goal created. FYI: Orthotics appointment information doesn't show up on Epic.     Eye exam - itchy eyes  CHW to assist with Eye appt. Referral was faxed by clinic staff to [facility]. New goal created.     OT evaluation - cognitive assessment  CHW to assist with OT appt if not scheduled yet at next outreach. New goal created.     Neuropsychological evaluation - major depression, hx of stroke  Per chart review, PCP placed a referral on 2/18/25. [facility] staff. New goal created. CHW to check on appt status at upcoming outreach.    Orthotics   Assisted patient scheduled ortho appointment appt on 2/26/25 at 10:00am.  [address]  [place] [address]  Appt # [phone]  Clinic #[phone]    Cane  Per patient she received a cane. Goal completed today.     Care Plans  Care Plan: PCA service       Problem: imparied mobility       Goal: Patient would like to explore if she could qualify for PCA service in the next 12 months.       Start Date: 7/16/2024 Expected End Date: 7/31/2025    This Visit's Progress: 70% Recent Progress: 60%    Note:     Barriers: language  Strengths: Willing to accept CCC support,   Patient expressed understanding of  goal: Yes    Action steps to achieve this goal:  1. I will answer my phone when [facility]  calls to schedule an in-person assessment.   2. I will provide accurate information when needed.  3. I will update CCC team at outreach.     Notes: CHW placed referral to [facility] today, 8/26/2024 and wait time is another 4 to 6 more weeks. Was on the phone with patient and Disability Specialists for 45 minutes and completed intake and patient is scheduled for another level of screening on 2/12/2025 at 12:15 PM.                             Care Plan: Neuropsychological evaluation       Problem: hx of stroke       Goal: General Goal - please update text                           Care Plan: Eye exam           Care Plan: Orthotics           Care Plan: OT and PT           Care Plan: audiology and ENT       Problem: hearing loss       Goal: Patient will attend audiology and ENT appointments in the next 12 months.       Start Date: 2/20/2025 Expected End Date: 12/31/2025    This Visit's Progress: 10%    Note:     Barriers: language barrier, low literacy, noncompliance, and lack of knowledge how to navigate complex health care system  Strengths: motivated to attend appt  Patient expressed understanding of goal: Yes    Action steps to achieve this goal:  1. I will answer my phone when I am contacted to schedule my appointment.  2. I will attend my audiology appts on 2/21/25 at 7:15 am. Rescheduled  3. I will attend   3. I will schedule a follow up appointment with my ENT/PCP if it is recommended to do so while I am at the clinic.  4. I will follow up with [facility] regarding this goal at each outreach until it is completed.                                 Plan: CHW to set up transportation for upcoming appts.   CHW delegations:   - assist with eye appt if not scheduled yet. Referral was faxed to [facility]   - Assist with OT appt  - check on TCCPW appt status.  - Pt attend orthotics appt on 2/26/25 ? Follow up appt  scehduled?

## 2025-02-20 NOTE — TELEPHONE ENCOUNTER
"Outgoing call:     Claudia  uses via [phone].   ID #: [id number]    Patient has not made spoken to any . Nobody has called her.     She does have an apt tomorrow for the cochlear implant eval and is unsure if she can get a ride. Pt was advised to give as much notice as possible if she is unable to keep this visit.     Patient says no changes to the right ear. The \"throbbing\" is really a sound she hears. Pt was noted in previous notes to have possible tinnitus.   -Was not keeping track of sodium intake.  -Advised to keep Na+ below 2000mg  per day  -Patient says that she sometimes gets fever and has chills. She has told her primary care provider about this and does take what they have advised her to take. (Possibly describing hot flashes).   -Patient reports no changes to her either ear.     -no dizziness, redness or other issues reported at the ear    Referral placed for Care coordinator and there are some notes on 1/31/2025 for transportation needs and PCS services.     Intervention and Education during outreach:  -CHW arranged transportation for all upcoming appointments and reminded patient.  -CHW assisted patient applied for SSI by conferencing call and completed intake which took about 45 minutes. Patient is scheduled for another level of screen with Disability Specialists on 2/12/2025 at 12:15 PM to determine if patient will qualify or if Disability Specialists can represent patient due to have saving account.   -Patient was informed to call with questions or concerns.      CHW Next Outreach: In one month.  "

## 2025-02-20 NOTE — TELEPHONE ENCOUNTER
The patient's PCP reached out to me due to the patient having some ear throbbing. When she saw my back on 01/29/25 she had right ear discomfort.     Please find out more information about her symptoms and what has changed?    I did discuss her MRI results previously with her. They are as followed:   There is some atrophy on the left mastoid wall, which most likely has been present for awhile.   The rest of her imaging appears fine. No concerns for infection.     She will need an  (Claudia).    - [phone]  Patient Number - [phone]    Thank You  ROMERO Willams CNP

## 2025-02-20 NOTE — TELEPHONE ENCOUNTER
Transportation is confirmed with [facility] for Audiology & ENT appointment arrival time at 7:15 AM and requested for 2/26/2025 with Drimmie as well.

## 2025-02-21 ENCOUNTER — PRE VISIT (OUTPATIENT)
Dept: AUDIOLOGY | Facility: CLINIC | Age: 55
End: 2025-02-21

## 2025-02-24 ENCOUNTER — APPOINTMENT (OUTPATIENT)
Dept: INTERPRETER SERVICES | Facility: CLINIC | Age: 55
End: 2025-02-24
Payer: COMMERCIAL

## 2025-02-24 NOTE — TELEPHONE ENCOUNTER
FUTURE VISIT INFORMATION      FUTURE VISIT INFORMATION:  Date: 3/4/25  Time: 7:30am  Location: [facility]  REFERRAL INFORMATION:  Referring provider:  Marisel Maxwell APRN CNP   Referring providers clinic:  [facility]  Reason for visit/diagnosis  CIE     RECORDS REQUESTED FROM:         Clinic name Comments Records Status Imaging Status   [facility] Ov/referral 1/29/25 [facility] Audio Audio 1/29/25 [facility]

## 2025-03-03 ENCOUNTER — HOSPITAL ENCOUNTER (OUTPATIENT)
Dept: CT IMAGING | Facility: HOSPITAL | Age: 55
Discharge: HOME | End: 2025-03-03
Attending: FAMILY MEDICINE | Admitting: FAMILY MEDICINE
Payer: COMMERCIAL

## 2025-03-03 DIAGNOSIS — I71.21 ANEURYSM OF ASCENDING AORTA WITHOUT RUPTURE: ICD-10-CM

## 2025-03-03 PROCEDURE — 250N000011 HC RX IP 250 OP 636: Performed by: FAMILY MEDICINE

## 2025-03-03 PROCEDURE — 71275 CT ANGIOGRAPHY CHEST: CPT

## 2025-03-03 RX ORDER — IOPAMIDOL 755 MG/ML
75 INJECTION, SOLUTION INTRAVASCULAR ONCE
Status: COMPLETED | OUTPATIENT
Start: 2025-03-03 | End: 2025-03-03

## 2025-03-03 RX ADMIN — IOPAMIDOL 75 ML: 755 INJECTION, SOLUTION INTRAVENOUS at 09:50

## 2025-03-03 NOTE — RESULT ENCOUNTER NOTE
RN team - please call patient with results. Imaging of the chest shows that you do not have dilation of the aorta, which is good news - everything looks normal.

## 2025-03-04 ENCOUNTER — OFFICE VISIT (OUTPATIENT)
Dept: AUDIOLOGY | Facility: CLINIC | Age: 55
End: 2025-03-04
Payer: COMMERCIAL

## 2025-03-04 ENCOUNTER — TELEPHONE (OUTPATIENT)
Dept: OTOLARYNGOLOGY | Facility: CLINIC | Age: 55
End: 2025-03-04

## 2025-03-04 ENCOUNTER — PRE VISIT (OUTPATIENT)
Dept: AUDIOLOGY | Facility: CLINIC | Age: 55
End: 2025-03-04

## 2025-03-04 ENCOUNTER — TELEPHONE (OUTPATIENT)
Dept: FAMILY MEDICINE | Facility: CLINIC | Age: 55
End: 2025-03-04

## 2025-03-04 ENCOUNTER — PATIENT OUTREACH (OUTPATIENT)
Dept: CARE COORDINATION | Facility: CLINIC | Age: 55
End: 2025-03-04

## 2025-03-04 DIAGNOSIS — H90.72 MIXED HEARING LOSS OF LEFT EAR: Primary | ICD-10-CM

## 2025-03-04 DIAGNOSIS — H90.5 SENSORINEURAL HEARING LOSS OF RIGHT EAR: ICD-10-CM

## 2025-03-04 DIAGNOSIS — H90.3 ASYMMETRICAL SENSORINEURAL HEARING LOSS: Primary | ICD-10-CM

## 2025-03-04 DIAGNOSIS — H90.3 SNHL (SENSORY-NEURAL HEARING LOSS), ASYMMETRICAL: ICD-10-CM

## 2025-03-04 NOTE — PROGRESS NOTES
AUDIOLOGY REPORT    SUBJECTIVE:     Rafael Bi 54 year old female  was seen in Audiology at [facility], on 3/4/2025 to assess audiologic candidacy for a cochlear implant (CI), which was ordered by Sofya Maxwell CNP. They were accompanied today by their .  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       Onset of hearing loss: R: 2024, L:17 years old  Identification of hearing loss: 2024  Etiology of hearing loss: Unknown, however patient had a stroke in June on 2024 and had an unknown left ear surgery at the age of 17  Sudden or Progressive: Progressive  Age Hearing Aid fit: Never trialed hearing aids  Duration of Hearing Aid use: N/A  Current Hearing Aid Type: N/A  Age of current Hearing Aid: N/A  Tinnitus: Present right  Balance: Imbalance  Other:  High blood pressure, stroke.   Does patient exhibit intelligible vocalizations?  Yes  Primary Mode of Communication: Oral/aural/lipreading   Previous Surgeries: N/A  Previous Ear Surgeries: Cyst removed from left ear    OBJECTIVE:  We discussed that since she has never tried hearing aids before that we should start with a hearing aid trial and patient was in agreement.    Patient is a hearing aid candidate. Patient would like to move forward with a hearing aid evaluation today. Therefore, the patient was presented with different options for amplification to help aid in communication. Discussed styles, levels of technology and monaural vs. binaural fitting.     The hearing aid(s) mutually chosen were:  Binaural: Phonak Audeo L70  COLOR: P7  BATTERY SIZE: rechargeable  EARMOLD/TIPS: cShell on the left  CANAL/ LENGTH: 0 std for right, 0 UP for left    Otoscopy revealed ears are clear of cerumen bilaterally. A left earmold was taken without incident.    ASSESSMENT:     Reviewed purchase  information and warranty information with patient. The 45 day trial period was explained to patient. The patient was given a copy of the [place] Department of Health consumer brochure on purchasing hearing instruments. Patient risk factors have been provided to the patient in writing prior to the sale of the hearing aid per FDA regulation. The risk factors are also available in the User Instructional Booklet to be presented on the day of the hearing aid fitting. Hearing aid(s) ordered. Hearing aid evaluation completed.    If patient does not see benefit from hearing aids then a cochlear implant evaluation will be warranted.    PLAN: Paw is scheduled to return in 2-3 weeks for a hearing aid fitting and programming. Purchase agreement will be completed on that date. Please contact this clinic with any questions or concerns.      Christiano Hernandez, [facility]  Licensed Audiologist  [place] License #[id number]

## 2025-03-04 NOTE — TELEPHONE ENCOUNTER
I last saw the patient back in January 2025 for concerns of asymmetrical hearing loss.   We discussed a cochlear implant possibly in the future. However, she has not tired hearing aids. Therefore, I give medical clearance for her to try hearing aids prior to a cochlear implant.     Schedule follow up with audiology.     ROMERO Willams CNP  Otolaryngology  [facility] [facility] [place]

## 2025-03-04 NOTE — PROGRESS NOTES
Clinic Care Coordination Contact  Community Health Worker Follow Up    Care Gaps:   Health Maintenance Due   Topic Date Due    ADVANCE CARE PLANNING  Never done    DEPRESSION ACTION PLAN  Never done    MAMMO SCREENING  02/10/2025    ZOSTER IMMUNIZATION (2 of 2) 03/04/2025     Care Gaps Last addressed on 2/20/2025.    Care Plan:   Care Plan: PCA service       Problem: imparied mobility       Goal: Patient would like to explore if she could qualify for PCA service in the next 12 months.       Start Date: 7/16/2024 Expected End Date: 7/31/2025    This Visit's Progress: 70% Recent Progress: 70%    Note:     Barriers: language  Strengths: Willing to accept CCC support,   Patient expressed understanding of goal: Yes    Action steps to achieve this goal:  1. I will answer my phone when [facility]  calls to schedule an in-person assessment.   2. I will provide accurate information when needed.  3. I will update CCC team at outreach.     Notes: CHW placed referral to [facility] today, 8/26/2024 and wait time is another 2 to 3 more weeks.                             Care Plan: Neuropsychological evaluation       Problem: hx of stroke       Goal: I would like to get neruopsycological evaluation in the next 3 months.       Start Date: 2/20/2025 Expected End Date: 5/28/2025    This Visit's Progress: 10%    Note:     Barriers: Language barrier and chronic illness.  Strengths: Willing to accept helps and able to communicate in own language.   Patient expressed understanding of goal: Yes.     Action steps to achieve this goal:  1. I will answer the phone when [facility] calls to schedule neuropsychological evaluation.   2. I will remember my appointment once it scheduled.   3. I will update CCC team at outreach.      Notes: CHW sent secure email to [facility] inquiring for appointment update.                             Care Plan: Eye exam       Problem: HP GENERAL PROBLEM       Goal: I would like to get annual eye exam done in the  next 30 days.       Start Date: 2/20/2025 Expected End Date: 3/19/2025    This Visit's Progress: 10%    Note:     Barriers: Language barrier and lack of community resources.   Strengths: Able to understand instruction and willing to accept helps   Patient expressed understanding of goal: Yes.     Action steps to achieve this goal:  1. I will attend my eye exam appointment as scheduled on 3/12/2025 at 2:55 PM. Ride requested with [employer].  2. I will answer my phone when  calls for .  3. I will update CCC team at outreach.      [facility]  [address]  [place] [address]  [phone]    -3/12/2025 2:55 PM, Cristina Morales                             Care Plan: Orthotics       Problem: HP GENERAL PROBLEM       Goal: I would like to be seen at orthotic clinic in the next 2 months.       Start Date: 2/20/2025 Expected End Date: 4/30/2025    This Visit's Progress: 10%    Note:     Barriers: Language barrier and lack of community resources.  Strengths: Willing to accept help and has [facility] services.  Patient expressed understanding of goal: Yes.    Action steps to achieve this goal:  1. I will attend my orthotic appointment on 2/26/2025 at 10:00 AM no showed and rescheduled on 3/13/2025 1:00 PM. Transportation requested with [employer].   2. I will schedule follow up appointment if recommended.   3. I will update CCC team at [facility].      Orthotics   [address]  [place] [address]  Appt # [phone]  Clinic #[phone]                            Care Plan: OT and PT       Problem: HP GENERAL PROBLEM       Goal: I would like to attend PT and OT in the next 6 to 8 weeks.       Start Date: 2/20/2025 Expected End Date: 5/1/2025    This Visit's Progress: 10%    Note:     Barriers: Language barrier and lack of community resources.  Strengths: Willing to accept help.  Patient expressed understanding of goal: Yes.    Action steps to achieve this goal:  1. I  will attend my OT appointment as scheduled on 3/24/2025 at 8:30 PM.  2. I will PT appointment as scheduled on 3/24/2025 at 9:45 AM.  3. I will  update CCC team at outreach.                             Care Plan: audiology and ENT       Problem: hearing loss       Goal: Patient will attend audiology and ENT appointments in the next 12 months.       Start Date: 2/20/2025 Expected End Date: 12/31/2025    This Visit's Progress: 20% Recent Progress: 10%    Note:     Barriers: language barrier, low literacy, noncompliance, and lack of knowledge how to navigate complex health care system  Strengths: motivated to attend appt  Patient expressed understanding of goal: Yes    Action steps to achieve this goal:  1. I will answer my phone when I am contacted to schedule my appointment.  2. I will attend my audiology appts on 2/21/25 at 7:15 am. Rescheduled on 3/4/2025 at 6:45 AM. Completed.  3. I will attend hearing aid fitting appointment on 5/22/2025 at 1:00 PM.  4. I will attend my initial hearing aid check on 6/12/2025 at 1:00 PM.  4. I will follow up with CCC regarding this goal at each outreach until it is completed.                           Intervention and Education during outreach:  - assist with eye appt if not scheduled yet. Referral was faxed to [facility]. Patient is scheduled on 3/12/2025 and transportation requested with [employer].  - Assist with OT appt: Scheduled and transportation requested.   - [facility].   - Pt attend orthotics appt on 2/26/25 ? Follow up appt scheduled: No showed and rescheduled and transportation requested with [employer].     CHW Next Outreach: In one month.

## 2025-03-04 NOTE — TELEPHONE ENCOUNTER
----- Message from Kristi Canada-Jessie sent at 3/3/2025  4:15 PM CST -----  RN team - please call patient with results. Imaging of the chest shows that you do not have dilation of the aorta, which is good news - everything looks normal.

## 2025-03-04 NOTE — PROGRESS NOTES
Audiogram not completed as one was previously completed on 1/29/25 and we do not have a Claudia language word list.    Christiano Hernandez, [facility]  Licensed Audiologist  [place] License #[id number]

## 2025-03-06 ENCOUNTER — APPOINTMENT (OUTPATIENT)
Dept: INTERPRETER SERVICES | Facility: CLINIC | Age: 55
End: 2025-03-06
Payer: COMMERCIAL

## 2025-03-10 ENCOUNTER — TELEPHONE (OUTPATIENT)
Dept: FAMILY MEDICINE | Facility: CLINIC | Age: 55
End: 2025-03-10
Payer: COMMERCIAL

## 2025-03-10 NOTE — TELEPHONE ENCOUNTER
----- Message from Kristi Adams sent at 3/8/2025  5:24 PM CST -----  Team - please call patient with results. Lung function studies were normal. We can discuss more at upcoming visit in April.

## 2025-03-12 ENCOUNTER — TRANSFERRED RECORDS (OUTPATIENT)
Dept: HEALTH INFORMATION MANAGEMENT | Facility: CLINIC | Age: 55
End: 2025-03-12

## 2025-03-24 ENCOUNTER — THERAPY VISIT (OUTPATIENT)
Dept: PHYSICAL THERAPY | Facility: REHABILITATION | Age: 55
End: 2025-03-24
Payer: COMMERCIAL

## 2025-03-24 ENCOUNTER — THERAPY VISIT (OUTPATIENT)
Dept: OCCUPATIONAL THERAPY | Facility: REHABILITATION | Age: 55
End: 2025-03-24
Attending: FAMILY MEDICINE
Payer: COMMERCIAL

## 2025-03-24 DIAGNOSIS — R41.9 COGNITIVE COMPLAINTS: ICD-10-CM

## 2025-03-24 DIAGNOSIS — I63.9 MULTIPLE CEREBRAL INFARCTIONS (H): ICD-10-CM

## 2025-03-24 DIAGNOSIS — I69.398 ALTERATION OF SENSATIONS, POST-STROKE: ICD-10-CM

## 2025-03-24 DIAGNOSIS — R20.9 ALTERATION OF SENSATIONS, POST-STROKE: ICD-10-CM

## 2025-03-24 DIAGNOSIS — R53.1 LEFT-SIDED WEAKNESS: Primary | ICD-10-CM

## 2025-03-24 DIAGNOSIS — M79.672 LEFT FOOT PAIN: ICD-10-CM

## 2025-03-24 PROCEDURE — T1013 SIGN LANG/ORAL INTERPRETER: HCPCS | Mod: U3 | Performed by: OCCUPATIONAL THERAPIST

## 2025-03-24 PROCEDURE — 97165 OT EVAL LOW COMPLEX 30 MIN: CPT | Mod: GO | Performed by: OCCUPATIONAL THERAPIST

## 2025-03-24 PROCEDURE — 97110 THERAPEUTIC EXERCISES: CPT | Mod: GP

## 2025-03-24 PROCEDURE — 97116 GAIT TRAINING THERAPY: CPT | Mod: GP

## 2025-03-24 PROCEDURE — 97112 NEUROMUSCULAR REEDUCATION: CPT | Mod: GP

## 2025-03-24 PROCEDURE — T1013 SIGN LANG/ORAL INTERPRETER: HCPCS | Mod: U4

## 2025-03-24 NOTE — PROGRESS NOTES
OCCUPATIONAL THERAPY EVALUATION  Type of Visit: Evaluation        Fall Risk Screen:  Fall screen completed by: OT  Have you fallen 2 or more times in the past year?: No  Have you fallen and had an injury in the past year?: No  Is patient a fall risk?: Yes  Fall screen comments: Pt is currently seeing Physical Therapy    Subjective        Presenting condition or subjective complaint: weakness from stroke, decline in ADL participation, cognitive assessment recommended by MD.   Date of onset: 03/24/25  3/24/25 order date from MD, stroke hosp admission 6/17/2024-6/19/2024  Relevant medical history: Stroke, Multiple cerebral infactions, alterations of sensations post-stroke, L sided weakness, L foot pain, Hearing loss in L ear, low literacy.   Dates & types of surgery: head abcess    Prior diagnostic imaging/testing results:       Prior therapy history for the same diagnosis, illness or injury: Yes 2024, Home Therapy after hospitalization for stroke in June 2024    Prior Level of Function  Transfers: Assistive equipment  Ambulation: Assistive equipment  ADL: Pt lived alone prior to stroke and has had to have increased services in last 9 months from family and [employer] worker.  IADL:  Unknown, pt had difficulty answering questions with the .     Living Environment  Social support: Alone, niece assists, [facility] worker 2x per week  Type of home: Apartment/condo   Stairs to enter the home: Yes 1 Is there a railing: Yes     Ramp: Yes   Stairs inside the home: No       Help at home: Self Cares (home health aide/personal care attendant, family, etc)  Equipment owned: Straight Cane; Walker; shower chair    Employment: No    Hobbies/Interests: none stated    Patient goals for therapy: able to walk more comfortably, be able to dress self more easily and help with simple cleaning  and organization tasks in the home.     Pain assessment: Pain denied during OT eval.     Objective     Cognitive Status Examination  Orientation:  Person, Place   Level of Consciousness: Alert  Follows Commands and Answers Questions: 50% of the time, Follows single step instructions, Unable to follow multi step instructions  Personal Safety and Judgement: Impaired  Memory: Impaired  Attention: No deficits identified  Organization/Problem Solving:  Difficulty assessing at this time. Requires assistance multiple times per week for basic ADL/IADLs  Executive Function:  impaired  Attempted to give attention screening using Symbol Digits Modalities Test but patient unable to understand directions with repeated directions through . Attempted Trail Making task and pt able to hold pencil and draw line but says she did not understand and did not want to proceed.   VISUAL SKILLS  Visual Acuity:  functional   Visual Field: Appears normal when observing with ambulation and moving in the clinic space  Visual Attention: Appears normal  Oculomotor: not assessed     SENSATION: sensory deficits post stroke, L leg bothers pt the most.    POSTURE: WFL, has new cane and seems to be set too high. Pt has PT who will work therapist with new device.  RANGE OF MOTION: UE AROM WFL  STRENGTH:  Pt is L dominant and L arm is slightly weaker than the R when observing with functional task.   MUSCLE TONE: WFL  COORDINATION: UE Coordination: L more difficult than Right , pt reports being L handed and now uses Right hand more often since infarctions.  BALANCE: WFL with AFO and cane.     FUNCTIONAL MOBILITY  Assistive Device(s): Cane (single end) and orthotic on L foot  Ambulation: WFL in clinic on flat surfaces, with turns with device. Is working with PT to improve.  Wheelchair: n/a        TRANSFERS: WFL    BATHING: WFL  Equipment: Shower chair/Tub bench    UPPER BODY DRESSING: WFL Observed taking off coat with extra time.   Equipment:  n/a    LOWER BODY DRESSING: WFL  Equipment:  n/a    TOILETING:  pt has incontinence per notes, needs further assessment.   Equipment:   unknown    GROOMING: WFL  Equipment:  none    EATING/SELF FEEDING: WFL   Equipment:  none    ACTIVITY TOLERANCE: Reports needing more help around the home. Feels low energy.    INSTRUMENTAL ACTIVITIES OF DAILY LIVING (IADL):   Meal Planning/Prep: completed by niece  Home/Financial Management: assist by social supports  Communication/Computer Use: difficulty with returning phone calls, making appointments. Needs assist  Community Mobility: requires transportation in community. Pays friend for rides.   Care of Others:- N/A            Assessment & Plan   CLINICAL IMPRESSIONS  Medical Diagnosis: Cerebral infarcts  multiple cerebral infarctions, L sided weakness, LLE pain, hearing loss L ear, alteration of sensations post-stroke  Treatment Diagnosis: Decline in ADL performance  Decline in ADL performance, L UE weakness  Impression/Assessment: Pt is a 54 year old female presenting to Occupational Therapy due to referral from her doctor to address depression/low energy, cognitive status and difficulty with ADL/IADLs.  The following significant findings have been identified: Impaired activity tolerance, Impaired balance, Impaired cognition, Impaired communication, Impaired hearing, Impaired mobility, and Impaired strength.  These identified deficits interfere with their ability to perform self care tasks, household chores, and meal planning and preparation as compared to previous level of function. Pt would like to work on self care and household chores. Notes indicate that pt is scheduled for a neuropsychological evaluation.     Clinical Decision Making (Complexity):  Assessment of Occupational Performance: 1-3 Performance Deficits  Occupational Performance Limitations: toileting, dressing, functional mobility, and hygiene and grooming  Clinical Decision Making (Complexity): Low complexity    PLAN OF CARE  Treatment Interventions:  Interventions: Self-Care/Home Management    Long Term Goals   OT Goal 1  Goal Identifier:  1  Goal Description: Pt will demonstrate increased strength in L UE as indicated by returning to using dominant L hand for cleaning tasks like dusting, washing surfaces, organizing belongings.  Target Date: 05/24/25  OT Goal 2  Goal Identifier: 2  Goal Description: Pt will be able to state 3 adaptive techniques/equipment for maximizing Indep with toileting and dressing.Pt will gain independence and be SBA with light household cleaning tasks .   Pt will have understanding of adaptive aides for improved independence and safety with toileting and dressing.       Frequency of Treatment: 1x per week1x per week   Duration of Treatment: 5 weeks 60 days    Recommended Referrals to Other Professionals:   Education Assessment: Learner/Method: Patient;Family (niece)  Education Comments: Benefits of OT and areas that can be addressed.Pt requires  and often does not seem to understand what is being asked. Pt has difficulty following 2 step directions. Pt reports no formal education past age 11. Explained role of OT and pt seemed to have some understanding. Needs reinforcement.     Risks and benefits of evaluation/treatment have been explained.   Patient/Family/caregiver agrees with Plan of Care.     Evaluation Time:  45  OT Eval, Low Complexity Minutes (40201): 45   Present: Yes: Language: Claudia, ID Number/Identifier: unknown      Signing Clinician: HEBER Frost        [facility]                                                                                   OUTPATIENT OCCUPATIONAL THERAPY      PLAN OF TREATMENT FOR OUTPATIENT REHABILITATION   Patient's Last Name, First Name, M.Rafael Butler    YOB: 1970   Provider's Name   [facility]   Medical Record No.  [id number]     Onset Date: 03/24/25 Start of Care Date: 03/24/25     Medical Diagnosis:  Cerebral infarcts      OT Treatment Diagnosis:  Decline in ADL performance  Plan of Treatment  Frequency/Duration:1x per weekweek/5 weeks    Certification date from 03/24/25   To 05/24/25        See note for plan of treatment details and functional goals     Jenny Middleton, OTR                         I CERTIFY THE NEED FOR THESE SERVICES FURNISHED UNDER        THIS PLAN OF TREATMENT AND WHILE UNDER MY CARE     (Physician attestation of this document indicates review and certification of the therapy plan).              Referring Provider:  Kristi Canada-Jessie    Initial Assessment  See Epic Evaluation- 03/24/25

## 2025-03-26 ENCOUNTER — PATIENT OUTREACH (OUTPATIENT)
Dept: CARE COORDINATION | Facility: CLINIC | Age: 55
End: 2025-03-26
Payer: COMMERCIAL

## 2025-03-26 NOTE — PROGRESS NOTES
Clinic Care Coordination Contact  Care Coordination Clinician Chart Review    Situation: Patient chart reviewed by Care Coordinator.       Background: Care Coordination Program started: 6/20/2024. Initial assessment completed and patient-centered care plan(s) were developed with participation from patient. Lead CC handed patient off to CHW for continued outreaches.       Assessment: Per chart review, patient outreach completed by CC CHW on 3/4/25.  Patient is actively working to accomplish goal(s). Patient's goal(s) appropriate and relevant at this time. Patient is not due for updated Plan of Care.  Assessments will be completed annually or as needed/with change of patient status.    Neuropsychological evaluation  Patient is scheduled with [facility] on 3/27/25 at 9:15 am. Appt reminder provided. Goal updated.     Eye exam   Patient confirmed she attended her eye appointment on 3/12/25. States to return in 1 yr around 3/12/26. Patient states she was told prescription glasses not needed but was given eye drops. Goal completed.     PCA service  Patient states she received a call from someone today stating that an  will be coming out to see her next month and they will call her later with date and time. CHW to follow up on status at next outreach. Goal up to date.     Orthotics   Patient confirmed she attended her orthotics appointment on 3/13/25 and they measured her feet. Patient was told someone will call her back when it's ready for her to come back. CHW to follow up at next outreach. Goal updated today.     PT and OT  Per chart review, patient attended PT and OT appts on 3/24/25. Patient is scheduled to follow-up with PT on 3/31/25. Appt reminder provided today and ride already set up. Patient is scheduled weekly in May, 25 with OT. CHW to assist with ride. Goal updated today.     Audiology and ENT - hearing loss  Per chart review, patient is scheduled for hearing aid fitting on 5/22/25 and follow up on  6/12/25. Goal up to date.         Care Plan: Establish care Completed 12/18/2024      Problem: est care  Resolved 12/18/2024      Goal: Patient would like to establish care with a new PCP in the next 6 months.  Completed 11/21/2024      Start Date: 6/20/2024 Expected End Date: 12/31/2024    This Visit's Progress: 100% Recent Progress: 60%    Note:     Barriers: language barrier, low literacy, noncompliance, and lack of knowledge how to navigate complex health care system  Strengths: motivated to attend appt  Patient expressed understanding of goal: Yes    Action steps to achieve this goal:  1. I will answer my phone when I am contacted to schedule my appointment.  2. I will attend my establish care appointment as scheduled on 8/22/2024 at 1:40pm. No showed  3. I will attend my rescheduled est care appointment with Dr Roman on 11/18/2024 at 3:10 pm. Completed.  4. CCC RN will assist patient to reschedule establish care with a provider while in the clinic.  5. I will follow up with CCC regarding this goal at each outreach until it is completed.                             Care Plan: Neurology Completed 11/4/2024      Problem: CVA  Resolved 11/4/2024      Goal: Patient will attend her neurology appointment in the next 6 months.  Completed 11/4/2024      Start Date: 6/20/2024 Expected End Date: 12/31/2024    This Visit's Progress: 100% Recent Progress: 50%    Note:     Barriers: language barrier, low literacy, noncompliance, and lack of knowledge how to navigate complex health care system  Strengths: motivated to attend appt  Patient expressed understanding of goal: Yes    Action steps to achieve this goal:  1. I will attend my neurology appointment as scheduled on 6/24/24 at 2:15pm. Completed.  2. I will attend my 4 months follow up appointment with neurologist as scheduled on 10/10/2024 at 2:15 PM. Completed.  3. I will attend my follow up appointment in one year, 10/20/2025 at 1:45 PM.                             Care  Plan: PCA service       Problem: imparied mobility       Goal: Patient would like to explore if she could qualify for PCA service in the next 12 months.       Start Date: 7/16/2024 Expected End Date: 7/31/2025    This Visit's Progress: 70% Recent Progress: 70%    Note:     Barriers: language  Strengths: Willing to accept CCC support,   Patient expressed understanding of goal: Yes    Action steps to achieve this goal:  1. I will answer my phone when [facility]  calls to schedule an in-person assessment.   2. I will provide accurate information when needed.  3. I will update CCC team at outreach.     Notes: CHW placed referral to [facility] today, 8/26/2024 and wait time is another 2 to 3 more weeks.                             Care Plan: PT Completed 12/18/2024      Problem: Impaired mobility  Resolved 12/18/2024      Goal: Patient will attend PT appointment in the next 4 months.  Completed 11/21/2024      Start Date: 8/26/2024 Expected End Date: 12/31/2024    This Visit's Progress: 100% Recent Progress: 40%    Note:     Barriers: language  Strengths: Willing to accept support  Patient expressed understanding of goal: Yes    Action steps to achieve this goal:  1. I will answer my phone when I am contacted to schedule my initial Physical Therapy appointment.  2. I will attend my initial Physical Therapy appointment on on 9/24/24 at 12:35pm. Attended.  3. I will follow up PT appointments as scheduled on 10/08/2024 at 1:50 PM  was no showed.   4. I will attend my follow up PT appointment on 10/22/24. Completed.   5. I will update CCC team at outreach.     Note: Patient is no longer interested in attending due to cold weather and not helpful.                            Care Plan: DME Completed 2/20/2025      Problem: impaired mobility  Resolved 2/20/2025      Goal: Patient would like to explore if she could qualify for a cane and incontinence supply in the next 90 days.  Completed 2/20/2025      Start Date:  8/26/2024 Expected End Date: 12/31/2024    This Visit's Progress: 100% Recent Progress: 60%    Note:     Barriers: language   Strengths: Accepting of support  Patient expressed understanding of goal: Yes    Action steps to achieve this goal:  1. I will take hard copy to [facility] with support from my [facility] worker in the next 30 days. Thida  patient has not received it yet.  2. I will follow up with CCC in the next month regarding this goal for additional coordination support.    Note: [facility] will drop off care to patient's home.                             Care Plan: Neuropsychological evaluation       Problem: hx of stroke       Goal: I would like to get neruopsycological evaluation in the next 3 months.       Start Date: 2/20/2025 Expected End Date: 5/28/2025    Recent Progress: 10%    Note:     Barriers: Language barrier and chronic illness.  Strengths: Willing to accept helps and able to communicate in own language.   Patient expressed understanding of goal: Yes.     Action steps to achieve this goal:  1. I will answer the phone when [facility] calls to schedule neuropsychological evaluation.   2. I will attend my [facility] appointment on 3/27/2025 at 9:15 AM. Ride set up by CHW.  3. I will update CCC team at outreach.      Notes: CHW sent secure email to [facility] inquiring for appointment update.                             Care Plan: Eye exam Completed 3/26/2025      Problem: HP GENERAL PROBLEM  Resolved 3/26/2025      Goal: I would like to get annual eye exam done in the next 30 days.  Completed 3/26/2025      Start Date: 2/20/2025 Expected End Date: 3/19/2025    This Visit's Progress: 100% Recent Progress: 10%    Note:     Barriers: Language barrier and lack of community resources.   Strengths: Able to understand instruction and willing to accept helps   Patient expressed understanding of goal: Yes.     Action steps to achieve this goal:  1. I will attend my eye exam appointment as scheduled on  3/12/2025 at 2:55 PM. Completed. To return in 1 yr.  2. I will answer my phone when  calls for .  3. I will update [facility] team at outreach.      [facility]  [address]  [place] [address]  [phone]    -3/12/2025 2:55 PM, Cristina Morales                             Care Plan: Orthotics       Problem: HP GENERAL PROBLEM       Goal: I would like to be seen at orthotic clinic in the next 2 months.       Start Date: 2/20/2025 Expected End Date: 4/30/2025    Recent Progress: 10%    Note:     Barriers: Language barrier and lack of community resources.  Strengths: Willing to accept help and has [facility] services.  Patient expressed understanding of goal: Yes.    Action steps to achieve this goal:  1. I will attend my orthotic appointment on 2/26/2025 at 10:00 AM no showed and rescheduled on 3/13/2025 1:00 PM. Completed.   2. Patient will attend follow-up appointment on TBD.  3. I will schedule follow up appointment if recommended.   4. I will update CCC team at outreach.      Orthotics   [address]  [facility] [address]  Appt # [phone]  Clinic #[phone]                            Care Plan: OT and PT       Problem: imparied mobility       Goal: I would like to attend PT and OT in the next 90 days.       Start Date: 2/20/2025 Expected End Date: 5/30/2025    Recent Progress: 10%    Note:     Barriers: Language barrier and lack of community resources.  Strengths: Willing to accept help.  Patient expressed understanding of goal: Yes.    Action steps to achieve this goal:  1. I will attend my OT appointment as scheduled on 3/24/2025 at 8:30 PM. Completed.  2. I will PT appointment as scheduled on 3/24/2025 at 9:45 AM. Completed.    OT:  5/5/25 at 8:30 am  5/12/25 at 10:00 am  5/19/25 at 9:15 am  5/28/25 at 9:15 am  3. I will  update [facility] team at [facility].                             Care Plan: audiology and ENT       Problem: hearing loss       Goal: Patient  will attend audiology and ENT appointments in the next 12 months.       Start Date: 2/20/2025 Expected End Date: 12/31/2025    Recent Progress: 20%    Note:     Barriers: language barrier, low literacy, noncompliance, and lack of knowledge how to navigate complex health care system  Strengths: motivated to attend appt  Patient expressed understanding of goal: Yes    Action steps to achieve this goal:  1. I will answer my phone when I am contacted to schedule my appointment.  2. I will attend my audiology appts on 2/21/25 at 7:15 am. Rescheduled on 3/4/2025 at 6:45 AM. Completed.  3. I will attend hearing aid fitting appointment on 5/22/2025 at 1:00 PM for hearing aid fitting.  4. I will attend my initial hearing aid check on 6/12/2025 at 1:00 PM for hearing aid initial check.  4. I will follow up with CCC regarding this goal at each outreach until it is completed.                                  Plan/Recommendations: The patient will continue working with Care Coordination to achieve goal(s) as above. CHW will continue outreaches at minimum every 30 days and will involve Lead CC as needed or if patient is ready to move to Maintenance. Lead CC will continue to monitor CHW outreaches and patient's progress to goal(s) every 6 weeks.     Plan of Care updated and sent to patient: No

## 2025-03-27 ENCOUNTER — PATIENT OUTREACH (OUTPATIENT)
Dept: CARE COORDINATION | Facility: CLINIC | Age: 55
End: 2025-03-27
Payer: COMMERCIAL

## 2025-03-27 NOTE — PROGRESS NOTES
Clinic Care Coordination Contact      Direct care provided in primary language, no  needed.     Follow Up Progress Note      Assessment: CCRN received a call from patient today requesting assist to re-order incontinence supply. CCRN connected patient [facility] today and re-ordered incontinence supply. It should arrives in the next 5 days. CHW to connect with [facility] worker to see if [facility] can assist patient re-order incontinence supply monthly.    Plan: CHW to connect with [facility] worker to request [facility] worker to assist patient re-order incontinence supply monthly.

## 2025-03-31 ENCOUNTER — THERAPY VISIT (OUTPATIENT)
Dept: PHYSICAL THERAPY | Facility: REHABILITATION | Age: 55
End: 2025-03-31
Payer: COMMERCIAL

## 2025-03-31 ENCOUNTER — DOCUMENTATION ONLY (OUTPATIENT)
Dept: FAMILY MEDICINE | Facility: CLINIC | Age: 55
End: 2025-03-31

## 2025-03-31 DIAGNOSIS — I69.398 ALTERATION OF SENSATIONS, POST-STROKE: ICD-10-CM

## 2025-03-31 DIAGNOSIS — R53.1 LEFT-SIDED WEAKNESS: Primary | ICD-10-CM

## 2025-03-31 DIAGNOSIS — R20.9 ALTERATION OF SENSATIONS, POST-STROKE: ICD-10-CM

## 2025-03-31 DIAGNOSIS — M72.2 PLANTAR FASCIAL FIBROMATOSIS: ICD-10-CM

## 2025-03-31 DIAGNOSIS — M79.672 LEFT FOOT PAIN: ICD-10-CM

## 2025-03-31 DIAGNOSIS — I63.9 MULTIPLE CEREBRAL INFARCTIONS (H): ICD-10-CM

## 2025-03-31 PROCEDURE — 97112 NEUROMUSCULAR REEDUCATION: CPT | Mod: GP

## 2025-03-31 PROCEDURE — 97110 THERAPEUTIC EXERCISES: CPT | Mod: GP

## 2025-03-31 NOTE — PROGRESS NOTES
DISCHARGE  Reason for Discharge: No further expectation of progress.  Patient chooses to discontinue therapy.    Equipment Issued: n/a    Discharge Plan: Patient to continue home program.    Referring Provider:  Kristi Adams        03/31/25 0500   Appointment Info   Signing clinician's name / credentials Josie Youssef DPT   Total/Authorized Visits E&T   Visits Used 4   Medical Diagnosis Multiple cerebral infarctions (H) (I63.9)    Alteration of sensations, post-stroke (I69.398, R20.9)    Left-sided weakness (R53.1)    Left foot pain (M79.672)   PT Tx Diagnosis Multiple cerebral infarctions (H) (I63.9)    Alteration of sensations, post-stroke (I69.398, R20.9)    Left-sided weakness (R53.1)    Left foot pain (M79.672); balance and gait impairments   Other pertinent information [employer]SPORT[employer] [place]; deaf in left ear;  iPad vol all the way up and keep it on R side   Quick Adds Certification   Progress Note/Certification   Start of Care Date 01/13/25   Onset of illness/injury or Date of Surgery 01/07/25  (provider order)   Therapy Frequency 1x/week   Predicted Duration 90 days   Certification date from 01/13/25   Certification date to 04/12/25   Progress Note Due Date 04/12/25   Progress Note Completed Date 01/13/25       Present Yes    Language Claudia    ID or First/Last Name [id number]   GOALS   PT Goals 2;3;4   PT Goal 1   Goal Identifier HEP   Goal Description Pt will be independent with HEP for self-management of symptoms   Rationale to maximize safety and independence within the home   Goal Progress goal not met; requires mod-max cueing   Target Date 02/10/25   PT Goal 2   Goal Identifier SL balance   Goal Description Improve single-leg stance balance for >5 seconds on left leg within 8 weeks by incorporating targeted balance exercises into the therapy program.   Rationale to maximize safety and independence within the community   Goal  "Progress close, pt able to hold L leg in air for 4 seconds though requires use of cane.   Target Date 03/10/25   PT Goal 3   Goal Identifier dynamic balance   Goal Description Enhance dynamic balance by successfully completing basic functional tasks, such as reaching for objects while standing and stepping over low obstacles, in 90% of practice sessions within 12 weeks.   Rationale to maximize safety and independence with performance of ADLs and functional tasks   Goal Progress performs hurdles (4\") though unsteadiness when trying to lead with LLE.   Target Date 04/07/25   PT Goal 4   Goal Identifier Gait   Goal Description Pt will obtain new cane and custom orthotic and demonstrate a more normalized gait pattern, mod-I and navigate uneven surfaces and curb for community ambulation   Rationale to maximize safety and independence within the community   Goal Progress did not obtain new orthotic. does demonstrate improved gait mechanics with her SEC in R hand, improved theron and less unsteadiness observed, no longer reaching for the walls.   Target Date 04/12/25   Subjective Report   Subjective Report Pt arrives today with the cane at a higher height again and significantly outside LEAH. However pt now using the cane in her R hand, as encouraged in the previous sessions. Pt reports she is doing fine. She has not seen much improvement. Writer asks pt during session if she would like to schedule more PT appts. Pt reports she feels ready to be done with PT for now.   Objective Measures   Objective Measures Objective Measure 1   Objective Measure 1   Objective Measure 2/10/25: 2+/5 PF strength L foot, 3/5 L hip flexors   Treatment Interventions (PT)   Interventions Therapeutic Procedure/Exercise;Neuromuscular Re-education;Manual Therapy;Gait Training   Therapeutic Procedure/Exercise   Therapeutic Procedures: strength, endurance, ROM, flexibility minutes (90570) 30   Ther Proc 1 - Details Review HEP, continued edu on " "importance of consistency, pt requiring mod-max cueing. Pt tends to try to lift her leg up with SLR, writer provided 10% assist, though pt does demonstrate ability to perform independently.   PTRx Ther Proc 1 Hip Flexion Straight Leg Raise   PTRx Ther Proc 1 - Details HEP   PTRx Ther Proc 2 Bridging #1   PTRx Ther Proc 2 - Details HEP   PTRx Ther Proc 3 Cone Taps in Single Leg Stance with Chair   PTRx Ther Proc 3 - Details HEP   Skilled Intervention review HEP   Patient Response/Progress pt understanding   Neuromuscular Re-education   Neuromuscular re-ed of mvmt, balance, coord, kinesthetic sense, posture, proprioception minutes (91831) 10   Neuro Re-ed 1 Gait with 4\" hurdles and SEC in R hand, some unsteadiness when leading with LLE. Cone taps with cues to try not using her hand to lift up her L thigh, pt successful 85% of the time. Addition of clamshells, much cueing for technique. Edu on how hip strength translates down the chain.   Skilled Intervention Performed to improve movement coordination, static and dynamic balance, kinesthetic awareness and positioning of involved body regions in the environment.   Patient Response/Progress tolerating well   PTRx Neuro Re-ed 1 Clamshell Feet Together   PTRx Neuro Re-ed 1 - Details HEP   Gait Training   Gait 1 Gait x20' increments: with cues for using cane in R hand, pt ambulated with CGA-Edgar -- unsteadiness but no overt LOB. Pt amb over 4\" pura, 6\" pura, encouraging pt to also trial leading with LLE (only performs on 4\" pura), ambulation with stepping onto Airex, and around obstacles. Pt fatiuging but tolerating.   Skilled Intervention for normalized gait pattern   Patient Response/Progress tolerating   Manual Therapy   Manual Therapy 1 IASTM to L plantar fascii and calf; grade II mobilizations for ankle DF   Manual Therapy 1 - Details pt in supine   Skilled Intervention Performed interventions to improve mobility and reduce pain in the involved body regions to " facilitate improved tolerance and performance with exercise and activities of daily living. Mobilizations for PF contracture   Patient Response/Progress reports this feels helpful for her pain   Education   Learner/Method Patient   Education Comments Education regarding mechanisms and rationale for physical therapy interventions selected to address their goals. Discussed self management strategies for ways patient can actively support progress towards goals. Education regarding activity precautions/restrictions. Addressed patients questions and concerns to their satisfaction prior to completion of visit.   Plan   Home program PTRX   Plan for next session discharged   Comments   Comments Pt returns regarding balance impairments (LLE>RLE) and left foot/calf pain s/p stroke. Pt continues to require mod-max cueing for performing HEP and appears unfamiliar with the exercises. Writer noticed that today was pt's last scheduled appt, asked pt if she would like to schedule more or be independent at home. Pt reports she can be done. Pt has shown progression in gait mechanics but not strengthening. Pt will progress at home provided adherence to HEP. Pt will follow with occupational therapy to address daily tasks.   Total Session Time   Timed Code Treatment Minutes 40   Total Treatment Time (sum of timed and untimed services) 40

## 2025-04-01 ENCOUNTER — TRANSFERRED RECORDS (OUTPATIENT)
Dept: HEALTH INFORMATION MANAGEMENT | Facility: CLINIC | Age: 55
End: 2025-04-01
Payer: COMMERCIAL

## 2025-04-08 ENCOUNTER — OFFICE VISIT (OUTPATIENT)
Dept: FAMILY MEDICINE | Facility: CLINIC | Age: 55
End: 2025-04-08
Payer: COMMERCIAL

## 2025-04-08 VITALS
RESPIRATION RATE: 16 BRPM | SYSTOLIC BLOOD PRESSURE: 138 MMHG | BODY MASS INDEX: 33.54 KG/M2 | OXYGEN SATURATION: 98 % | HEART RATE: 63 BPM | WEIGHT: 149.1 LBS | DIASTOLIC BLOOD PRESSURE: 86 MMHG | TEMPERATURE: 98 F | HEIGHT: 56 IN

## 2025-04-08 DIAGNOSIS — I67.2 INTRACRANIAL ATHEROSCLEROSIS: ICD-10-CM

## 2025-04-08 DIAGNOSIS — M79.672 LEFT FOOT PAIN: Primary | ICD-10-CM

## 2025-04-08 DIAGNOSIS — Z86.73 HISTORY OF STROKE: ICD-10-CM

## 2025-04-08 PROCEDURE — 3079F DIAST BP 80-89 MM HG: CPT

## 2025-04-08 PROCEDURE — 99214 OFFICE O/P EST MOD 30 MIN: CPT

## 2025-04-08 PROCEDURE — 3075F SYST BP GE 130 - 139MM HG: CPT

## 2025-04-08 PROCEDURE — T1013 SIGN LANG/ORAL INTERPRETER: HCPCS

## 2025-04-08 RX ORDER — GABAPENTIN 300 MG/1
300 CAPSULE ORAL 2 TIMES DAILY
Qty: 60 CAPSULE | Refills: 1 | Status: SHIPPED | OUTPATIENT
Start: 2025-04-08

## 2025-04-08 RX ORDER — ATORVASTATIN CALCIUM 80 MG/1
80 TABLET, FILM COATED ORAL EVERY EVENING
Qty: 90 TABLET | Refills: 3 | Status: SHIPPED | OUTPATIENT
Start: 2025-04-08

## 2025-04-08 ASSESSMENT — PATIENT HEALTH QUESTIONNAIRE - PHQ9
SUM OF ALL RESPONSES TO PHQ QUESTIONS 1-9: 1
SUM OF ALL RESPONSES TO PHQ QUESTIONS 1-9: 1
10. IF YOU CHECKED OFF ANY PROBLEMS, HOW DIFFICULT HAVE THESE PROBLEMS MADE IT FOR YOU TO DO YOUR WORK, TAKE CARE OF THINGS AT HOME, OR GET ALONG WITH OTHER PEOPLE: NOT DIFFICULT AT ALL

## 2025-04-08 NOTE — PATIENT INSTRUCTIONS
Thank you for choosing the [facility], it was a pleasure to see you today!    Please take a look at the information below for more specific details regarding the treatment plan and recommendations.    -In this after visit summary is a list of your medications and specific instructions.  Please review this carefully as there may be changes made to your medication list.  -If there are any particular questions or concerns, please feel free to reach out.  -If any labs have been completed, we will reach out to you about results.  If the results are normal or not concerning, a letter or [employer] message will be sent to you.  If any follow-up is needed, either RJ or the nurse will give you a call.  If you have not heard regarding results after 2 weeks, please reach out to the clinic.           Please seek immediate medical attention (go to the emergency room or urgent care) for the following reasons: worsening symptoms or any concerning changes.      --------------------------------------------------------------------------------------------------------------------    -We are always looking for ways to improve.  You may be selected to receive a survey regarding your visit today.  We encourage you to complete the survey and provide specific, constructive feedback to help us improve our processes. If you answer the survey/phone call this will also prompt them to STOP calling you, otherwise they will try again each day for several day! Thank you for your time!    -Please review the contact information listed on the after visit summary and in the electronic chart.  Below is the phone number that we have on file.  If there are any changes that are needed to be made, please reach out to the clinic.  [phone] (home)

## 2025-04-08 NOTE — PROGRESS NOTES
Assessment & Plan    Left foot pain    - Differentials considered include deep vein thrombosis, cellulitis, and peripheral neuropathy. Deep vein thrombosis and cellulitis were excluded due to the absence of acute symptoms such as redness, warmth, or severe swelling. Peripheral neuropathy was considered due to the tingling and burning sensation.  - Support for chosen diagnosis: The patient's history of stroke and the presence of tingling, burning pain suggest neuropathic pain as a component of her symptoms.  - Treatment plan: Increase gabapentin to twice daily (one pill in the morning, one pill before bedtime). Contact insurance to facilitate coverage for custom foot orthotics. Follow-up with Doctor Roman in 7 days to assess the effectiveness of the increased gabapentin dosage.  - Would consider: If the problem does not improve, consider further imaging studies or referral to a specialist for comprehensive evaluation and management.     - gabapentin (NEURONTIN) 300 MG capsule  Dispense: 60 capsule; Refill: 1    Intracranial atherosclerosis  H/O Rt UZIEL & MCA stroke    - atorvastatin (LIPITOR) 80 MG tablet  Dispense: 90 tablet; Refill: 3      Please seek immediate medical attention (go to the emergency room or urgent care) if symptoms worser or for concerning changes.    Follow-up with PCP for ongoing management, if symptoms do not improve as anticipated, and as needed for acute concern      Subjective   Rafael is a 54 year old year old female, presenting for the following health issues:  Chief Complaint   Patient presents with    Musculoskeletal Problem     Left leg pain          4/8/2025    10:56 AM   Additional Questions   Roomed by vonda williamson   Accompanied by Lana         4/8/2025    10:56 AM   Patient Reported Additional Medications   Patient reports taking the following new medications vonda williamson     HPI  Rfaael Cristi, a 54-year-old female, reported ongoing pain in her left leg, which has been persistent for several weeks  since her last visit. She has been taking pain medication, but it has not provided relief, and she feels the pain is worsening. The pain is primarily located in her ankle and extends to her calf. She noted a tingling, burning sensation rather than a sharp, static pain. She has a custom orthotic for her leg, but her insurance did not cover it, and she has not been wearing it regularly. The cost of the orthotic is prohibitive, and she is seeking assistance with insurance coverage. Additionally, she mentioned experiencing weakness in her leg, which she attributes to a previous stroke. She has been prescribed Tylenol by Dr. Roman, but she has not been taking it regularly and does not wish to continue with it. She also mentioned a need for assistance with her SSI process and has been referred to a care coordinator for this matter.    Last visit note: She has residual left-sided weakness and left leg/foot pain. She would like to restart physical therapy, she just needs a ride to her appointments. Asked staff to help her schedule this today. She has been doing home exercises. Will increase gabapentin from 100 mg to 300 mg to address pain. A custom orthotic was ordered by podiatry in November but she has not gone to an appointment for this. Will have care coordination to help her set up an appointment.       ---------------------------------------------------------------------------------------    Answers submitted by the patient for this visit:  Provider Visit on 4/8/2025 11:15 AM with Greyson Donohue  Patient Health Questionnaire (Submitted on 4/8/2025)  If you checked off any problems, how difficult have these problems made it for you to do your work, take care of things at home, or get along with other people?: Not difficult at all  PHQ9 TOTAL SCORE: 1  General Questionnaire (Submitted on 4/8/2025)  Chief Complaint: Chronic problems general questions HPI Form  What is the reason for your visit today? : leg  How many  "days per week do you miss taking your medication?: 0    Objective    Vital signs: /86   Pulse 63   Temp 98  F (36.7  C) (Oral)   Resp 16   Ht 1.429 m (4' 8.25\")   Wt 67.6 kg (149 lb 1.6 oz)   LMP  (LMP Unknown)   SpO2 98%   BMI 33.13 kg/m      Body mass index is 33.13 kg/m .    Physical Exam    General: Alert and oriented, in no acute distress.  Musculoskeletal: Edema observed in the left ankle. Pain not elicited with movement in various directions, slight pain with internal rotation. ROM not limited.  Extremities: Peripheral pulses intact, no peripheral edema.  Psych: Normal mood and affect.         ---------------------------------------------------------------------------------------   Amount of time spent in chart review, direct patient contact, care coordination, and related activities to patient care on the day of appointment: 31 minutes.      Options for treatment and follow-up care were reviewed with the patient. Paw Bi and/or guardian was engaged and actively involved in the decision making process. Paw Bi and/or guardian verbalized understanding of the options discussed and was satisfied with the final plan.    Patient consented to use of ambient AI scribe prior to initiation of visit.    Signed Electronically by: ROMERO Harper CNP      "

## 2025-04-10 ENCOUNTER — DOCUMENTATION ONLY (OUTPATIENT)
Dept: ORTHOPEDICS | Facility: CLINIC | Age: 55
End: 2025-04-10
Payer: COMMERCIAL

## 2025-04-10 ENCOUNTER — PATIENT OUTREACH (OUTPATIENT)
Dept: CARE COORDINATION | Facility: CLINIC | Age: 55
End: 2025-04-10
Payer: COMMERCIAL

## 2025-04-10 NOTE — PROGRESS NOTES
Paw was no show for her delivery appointment. It appears that she is under the impression that her Custom foot orthoses and the new AFO that will be needed to work in tandem will not be covered.     Pre-auth was checked for these items and was not needed, nor do we expect that there would be a denial for them that would result in an out of pocket cost.     Will attempt to get her scheduled again for fit and delivery of her custom foot orthoses and left AFO.       Electronically signed by JHONY Meeks CPO  [facility]  [phone]

## 2025-04-10 NOTE — PROGRESS NOTES
"Clinic Care Coordination Contact    Situation: Patient chart reviewed by care coordinator.    Background: CCRN received a new CC referral placed by Greyson Donohue APRN CNP.     Assessment: Per orthotic clinic notes dated today.     Note:  PCP of Dr. Roman, ordered custom orthotics for left foot, created but never obtained because a \"PT told her they were not covered.\" Can we help find out why and if these are not being covered.    Paw was no show for her delivery appointment. It appears that she is under the impression that her Custom foot orthoses and the new AFO that will be needed to work in tandem will not be covered.      Pre-auth was checked for these items and was not needed, nor do we expect that there would be a denial for them that would result in an out of pocket cost.      Will attempt to get her scheduled again for fit and delivery of her custom foot orthoses and left AFO.         Electronically signed by JHONY Meeks CPO [facility]  [phone]    Plan/Recommendations: CHW to assist patient reschedule appointment with orthotic for custom foot orthotics.     "

## 2025-04-14 ENCOUNTER — TELEPHONE (OUTPATIENT)
Dept: FAMILY MEDICINE | Facility: CLINIC | Age: 55
End: 2025-04-14
Payer: COMMERCIAL

## 2025-04-14 ENCOUNTER — PATIENT OUTREACH (OUTPATIENT)
Dept: CARE COORDINATION | Facility: CLINIC | Age: 55
End: 2025-04-14
Payer: COMMERCIAL

## 2025-04-14 NOTE — TELEPHONE ENCOUNTER
(TC) collected forms from . Forms pre-filled for provider review, completion, and signature. Forms placed in provider's blue folder. Ty.

## 2025-04-14 NOTE — PROGRESS NOTES
Clinic Care Coordination Contact  Community Health Worker Follow Up    Care Gaps:   Health Maintenance Due   Topic Date Due    ADVANCE CARE PLANNING  Never done    DEPRESSION ACTION PLAN  Never done    MAMMO SCREENING  02/10/2025    ZOSTER IMMUNIZATION (2 of 2) 03/04/2025     PCP to discuss medical care gaps.    Care Plan:   Care Plan: Establish care Completed 12/18/2024      Problem: est care  Resolved 12/18/2024      Goal: Patient would like to establish care with a new PCP in the next 6 months.  Completed 11/21/2024      Start Date: 6/20/2024 Expected End Date: 12/31/2024    This Visit's Progress: 100% Recent Progress: 60%    Note:     Barriers: language barrier, low literacy, noncompliance, and lack of knowledge how to navigate complex health care system  Strengths: motivated to attend appt  Patient expressed understanding of goal: Yes    Action steps to achieve this goal:  1. I will answer my phone when I am contacted to schedule my appointment.  2. I will attend my establish care appointment as scheduled on 8/22/2024 at 1:40pm. No showed  3. I will attend my rescheduled est care appointment with Dr Roman on 11/18/2024 at 3:10 pm. Completed.  4. CCC RN will assist patient to reschedule establish care with a provider while in the clinic.  5. I will follow up with CCC regarding this goal at each outreach until it is completed.                             Care Plan: Neurology Completed 11/4/2024      Problem: CVA  Resolved 11/4/2024      Goal: Patient will attend her neurology appointment in the next 6 months.  Completed 11/4/2024      Start Date: 6/20/2024 Expected End Date: 12/31/2024    This Visit's Progress: 100% Recent Progress: 50%    Note:     Barriers: language barrier, low literacy, noncompliance, and lack of knowledge how to navigate complex health care system  Strengths: motivated to attend appt  Patient expressed understanding of goal: Yes    Action steps to achieve this goal:  1. I will attend my  neurology appointment as scheduled on 6/24/24 at 2:15pm. Completed.  2. I will attend my 4 months follow up appointment with neurologist as scheduled on 10/10/2024 at 2:15 PM. Completed.  3. I will attend my follow up appointment in one year, 10/20/2025 at 1:45 PM.                             Care Plan: PCA service       Problem: imparied mobility       Goal: Patient would like to explore if she could qualify for PCA service in the next 12 months.       Start Date: 7/16/2024 Expected End Date: 7/31/2025    This Visit's Progress: 70% Recent Progress: 70%    Note:     Barriers: language  Strengths: Willing to accept CCC support,   Patient expressed understanding of goal: Yes    Action steps to achieve this goal:  1. I will answer my phone when [facility]  calls to schedule an in-person assessment.   2. I will be home when assessment is scheduled.  3. I will update CCC team at outreach.     Notes: CHW placed referral to [facility] today, 8/26/2024 and per [facility] that patient will be receiving call in the next 3 to 4 weeks. [facility] is assigning referral for the month of August of 2024.                             Care Plan: PT Completed 12/18/2024      Problem: Impaired mobility  Resolved 12/18/2024      Goal: Patient will attend PT appointment in the next 4 months.  Completed 11/21/2024      Start Date: 8/26/2024 Expected End Date: 12/31/2024    This Visit's Progress: 100% Recent Progress: 40%    Note:     Barriers: language  Strengths: Willing to accept support  Patient expressed understanding of goal: Yes    Action steps to achieve this goal:  1. I will answer my phone when I am contacted to schedule my initial Physical Therapy appointment.  2. I will attend my initial Physical Therapy appointment on on 9/24/24 at 12:35pm. Attended.  3. I will follow up PT appointments as scheduled on 10/08/2024 at 1:50 PM  was no showed.   4. I will attend my follow up PT appointment on 10/22/24. Completed.   5. I will  update CCC team at outreach.     Note: Patient is no longer interested in attending due to cold weather and not helpful.                            Care Plan: DME Completed 2/20/2025      Problem: impaired mobility  Resolved 2/20/2025      Goal: Patient would like to explore if she could qualify for a cane and incontinence supply in the next 90 days.  Completed 2/20/2025      Start Date: 8/26/2024 Expected End Date: 12/31/2024    This Visit's Progress: 100% Recent Progress: 60%    Note:     Barriers: language   Strengths: Accepting of support  Patient expressed understanding of goal: Yes    Action steps to achieve this goal:  1. I will take hard copy to [facility] with support from my arm worker in the next 30 days. Thida  patient has not received it yet.  2. I will follow up with CCC in the next month regarding this goal for additional coordination support.    Note: ThiDa will drop off care to patient's home.                             Care Plan: Neuropsychological evaluation       Problem: hx of stroke       Goal: I would like to get neruopsycological evaluation in the next 3 months.  Completed 4/14/2025      Start Date: 2/20/2025 Expected End Date: 5/28/2025    This Visit's Progress: 100% Recent Progress: 10%    Note:     Barriers: Language barrier and chronic illness.  Strengths: Willing to accept helps and able to communicate in own language.   Patient expressed understanding of goal: Yes.     Action steps to achieve this goal:  I attended neuropsychological evaluation as scheduled on 3/27/2025 at 9:00 AM.   I will received a copy of neuropsychological evaluation from [facility] once completed.  3.   I will update CCC team at outreach.                              Care Plan: Eye exam Completed 3/26/2025      Problem: HP GENERAL PROBLEM  Resolved 3/26/2025      Goal: I would like to get annual eye exam done in the next 30 days.  Completed 3/26/2025      Start Date: 2/20/2025 Expected End  Date: 3/19/2025    This Visit's Progress: 100% Recent Progress: 10%    Note:     Barriers: Language barrier and lack of community resources.   Strengths: Able to understand instruction and willing to accept helps   Patient expressed understanding of goal: Yes.     Action steps to achieve this goal:  1. I will attend my eye exam appointment as scheduled on 3/12/2025 at 2:55 PM. Completed. To return in 1 yr.  2. I will answer my phone when  calls for .  3. I will update [facility] team at outreach.      [facility]  [address]  [place] [address]  [phone]    -3/12/2025 2:55 PM, Cristina Morales                             Care Plan: Orthotics       Problem: HP GENERAL PROBLEM       Goal: I would like to be seen at orthotic clinic in the next 2 months.       Start Date: 2/20/2025 Expected End Date: 4/30/2025    This Visit's Progress: 20% Recent Progress: 10%    Note:     Barriers: Language barrier and lack of community resources.  Strengths: Willing to accept help and has Critical access hospital services.  Patient expressed understanding of goal: Yes.    Action steps to achieve this goal:  1. I will attend my ortho appointment as scheduled on 4/22/2025 at 9:00 AM fordelivery ASO ankle brace .  2. I will answer my phone when  calls for appointment .  3. I will update [facility] team at outreach.      Orthotics   [id number]  [place] [address]  Appt # [phone]  Clinic #[phone]                            Care Plan: OT and PT       Problem: imparied mobility       Goal: I would like to attend PT and OT in the next 90 days.       Start Date: 2/20/2025 Expected End Date: 5/30/2025    This Visit's Progress: 20% Recent Progress: 10%    Note:     Barriers: Language barrier and lack of community resources.  Strengths: Willing to accept help.  Patient expressed understanding of goal: Yes.    Action steps to achieve this goal:  1. I will attend my OT  appointment as scheduled on 5/05/2025 at 8:30 PM, 5/12/25 at 10:00 am, 5/19/25 at 9:15 am and 5/28/25 at 9:15 am   2. I will schedule for more follow up if recommended and needed.  3. I will update CCC team at outreach.                             Care Plan: audiology and ENT       Problem: hearing loss       Goal: Patient will attend audiology and ENT appointments in the next 12 months.       Start Date: 2/20/2025 Expected End Date: 12/31/2025    This Visit's Progress: 30% Recent Progress: 20%    Note:     Barriers: language barrier, low literacy, noncompliance, and lack of knowledge how to navigate complex health care system  Strengths: motivated to attend appt  Patient expressed understanding of goal: Yes    Action steps to achieve this goal:  1. I will attend hearing aid fitting appointment on 5/22/2025 at 1:00 PM for hearing aid fitting.  2. I will attend my initial hearing aid check on 6/12/2025 at 1:00 PM for hearing aid initial check.  3. I will follow up with CCC regarding this goal at each outreach until it is completed.                           Intervention and Education during outreach:  -CHW reminded patinet of upcoming appointments and transportation arranged as approproated.   -Ortho appointment is rescheduled on 4/22/2025 at 9:00 AM and transportation requested to [employer].  -CHW informed patient to call if there are more appointments that CHW is not aware and let CHW know for ride needed.  -CHW follow up with [facility] and was informed they are assigning referrals for the month of August 2024 and patient will be receiving call soon.  -CHW followed up with [facility] and neuropsychological evaluation completed on 3/27/2025 and Dr. Sanz is finalizing the notes and will be faxing over today or tomorrow.   -Patient's [employer] worker agreed to call form monthly diaper refill and CHW send email with info to call.  -Patient's ARM worker will assist with SSI process.  -Patient to call with  questions or concerns.     CHW Next Outreach: In one month.

## 2025-04-14 NOTE — TELEPHONE ENCOUNTER
Forms/Letter Request    Type of form/letter: OTHER: [facility]        Do we have the form/letter: Yes: incoming fax bin for      Who is the form from? [facility]  (if other please explain)    Where did/will the form come from? form was faxed in    When is form/letter needed by: ASAP    How would you like the form/letter returned: Fax : [phone]

## 2025-04-15 ENCOUNTER — ANCILLARY PROCEDURE (OUTPATIENT)
Dept: MAMMOGRAPHY | Facility: CLINIC | Age: 55
End: 2025-04-15
Attending: FAMILY MEDICINE
Payer: COMMERCIAL

## 2025-04-15 ENCOUNTER — OFFICE VISIT (OUTPATIENT)
Dept: FAMILY MEDICINE | Facility: CLINIC | Age: 55
End: 2025-04-15
Payer: COMMERCIAL

## 2025-04-15 VITALS
SYSTOLIC BLOOD PRESSURE: 131 MMHG | RESPIRATION RATE: 16 BRPM | OXYGEN SATURATION: 100 % | TEMPERATURE: 98.6 F | HEART RATE: 66 BPM | HEIGHT: 56 IN | WEIGHT: 150 LBS | DIASTOLIC BLOOD PRESSURE: 83 MMHG | BODY MASS INDEX: 33.74 KG/M2

## 2025-04-15 DIAGNOSIS — M79.672 LEFT FOOT PAIN: ICD-10-CM

## 2025-04-15 DIAGNOSIS — R20.9 ALTERATION OF SENSATIONS, POST-STROKE: Primary | ICD-10-CM

## 2025-04-15 DIAGNOSIS — I69.398 ALTERATION OF SENSATIONS, POST-STROKE: Primary | ICD-10-CM

## 2025-04-15 DIAGNOSIS — Z23 NEED FOR VACCINATION: ICD-10-CM

## 2025-04-15 DIAGNOSIS — I63.9 MULTIPLE CEREBRAL INFARCTIONS (H): ICD-10-CM

## 2025-04-15 DIAGNOSIS — F32.5 MAJOR DEPRESSIVE DISORDER, SINGLE EPISODE, IN REMISSION: ICD-10-CM

## 2025-04-15 DIAGNOSIS — R53.1 LEFT-SIDED WEAKNESS: ICD-10-CM

## 2025-04-15 DIAGNOSIS — Z12.31 VISIT FOR SCREENING MAMMOGRAM: ICD-10-CM

## 2025-04-15 DIAGNOSIS — I10 ESSENTIAL HYPERTENSION: ICD-10-CM

## 2025-04-15 PROCEDURE — 99214 OFFICE O/P EST MOD 30 MIN: CPT | Performed by: FAMILY MEDICINE

## 2025-04-15 PROCEDURE — 77067 SCR MAMMO BI INCL CAD: CPT | Mod: TC | Performed by: RADIOLOGY

## 2025-04-15 PROCEDURE — 90471 IMMUNIZATION ADMIN: CPT | Performed by: FAMILY MEDICINE

## 2025-04-15 PROCEDURE — 90750 HZV VACC RECOMBINANT IM: CPT | Performed by: FAMILY MEDICINE

## 2025-04-15 PROCEDURE — T1013 SIGN LANG/ORAL INTERPRETER: HCPCS

## 2025-04-15 PROCEDURE — 3075F SYST BP GE 130 - 139MM HG: CPT | Performed by: FAMILY MEDICINE

## 2025-04-15 PROCEDURE — 3079F DIAST BP 80-89 MM HG: CPT | Performed by: FAMILY MEDICINE

## 2025-04-15 PROCEDURE — G2211 COMPLEX E/M VISIT ADD ON: HCPCS | Performed by: FAMILY MEDICINE

## 2025-04-15 RX ORDER — GABAPENTIN 300 MG/1
300 CAPSULE ORAL 2 TIMES DAILY
Qty: 60 CAPSULE | Refills: 3 | Status: SHIPPED | OUTPATIENT
Start: 2025-04-15

## 2025-04-15 NOTE — Clinical Note
Florentino Shelley - I saw from one of your last notes that you were trying to get the evaluation from [facility] but I could not find it in the media tab. Would you be able to follow up on this? Thank you!

## 2025-04-15 NOTE — PROGRESS NOTES
Assessment & Plan     Alteration of sensations, post-stroke  Left foot pain  Multifactorial left foot pain that started post-stroke, likely a combination of neuropathy, altered gait mechanics, and possibly poorly fitting orthotic. She would like to trial increased dose of gabapentin - 300mg BID instead of once daily. Care coordination assisting in setting up appointment for custom orthotic.  - gabapentin (NEURONTIN) 300 MG capsule; Take 1 capsule (300 mg) by mouth 2 times daily.    Visit for screening mammogram  Agrees to mammo today  - MA Screening Digital Bilateral; Future    Need for vaccination  - ZOSTER RECOMBINANT ADJUVANTED (SHINGRIX)    Essential hypertension  BP close to goal < 130/80, continue current meds.    Multiple cerebral infarctions (H)  Left-sided weakness  She is unable to work due to deficits after her stroke. Will fill out disability paperwork when received. Reviewed PCA process - she has been referred.    Cognitive complaints  Records not received from [facility] evaluation - will have CCC team reach out to obtain.    Major depressive disorder, single episode, in remission  Symptoms improved with higher dose of amitriptyline. Continue.        Follow up in 4 months for depression, HTN, CVA, foot pain.  The longitudinal plan of care for the diagnosis(es)/condition(s) as documented were addressed during this visit. Due to the added complexity in care, I will continue to support Rafael in the subsequent management and with ongoing continuity of care.    Subjective   Paw is a 54 year old, presenting for the following health issues:  Follow Up (HTN , CVA, Pain)      4/15/2025    10:46 AM   Additional Questions   Roomed by JIMENEZ Gonzalez   Accompanied by self     History of Present Illness       Reason for visit:  Leg   She is taking medications regularly.    Left eye blurry - started after stroke - when closes right eye  Saw eye doctor - was given drops which helped, but problem started again after  "stopping drops    Get [facility] eval    Wondering about disability and wondering also about PCA application    Gabapentin - taking once daily, did not increase yet  Gets soreness in leg still, does have appt with ortho set up for new orthotic    Mood feels better compared to before        1/7/2025     9:00 AM 2/18/2025     2:37 PM 4/8/2025    10:51 AM   PHQ   PHQ-9 Total Score 8  21  1    Q9: Thoughts of better off dead/self-harm past 2 weeks Not at all  Several days  Not at all   F/U: Thoughts of suicide or self-harm  No     F/U: Safety concerns  No         Patient-reported    Proxy-reported       PHQ-2 Score:         1/7/2025     9:00 AM 3/8/2024    11:38 AM   PHQ-2 ( 1999 [employer])   Q1: Little interest or pleasure in doing things 3  1   Q2: Feeling down, depressed or hopeless 3  1   PHQ-2 Score 6  2   Q1: Little interest or pleasure in doing things Nearly every day    Q2: Feeling down, depressed or hopeless Nearly every day    PHQ-2 Score 6        Proxy-reported                     Review of Systems  Constitutional, HEENT, cardiovascular, pulmonary, gi and gu systems are negative, except as otherwise noted.      Objective    /83   Pulse 66   Temp 98.6  F (37  C) (Oral)   Resp 16   Ht 1.429 m (4' 8.25\")   Wt 68 kg (150 lb)   LMP  (LMP Unknown)   SpO2 100%   BMI 33.33 kg/m    Body mass index is 33.33 kg/m .  Physical Exam   GENERAL: alert and no distress  EYES: Eyes grossly normal to inspection, PERRL and conjunctivae and sclerae normal  RESP: lungs clear to auscultation - no rales, rhonchi or wheezes  CV: regular rate and rhythm, normal S1 S2, no S3 or S4, no murmur, click or rub, no peripheral edema  MS: left foot with AFO brace on, removed, no redness or skin changes, no tenderness to palpation  SKIN: no suspicious lesions or rashes  NEURO: Normal strength and tone, mentation intact and speech normal  PSYCH: mentation appears normal, affect normal/bright            Signed Electronically by: Kristi" MD Angie

## 2025-04-16 PROBLEM — F32.5 MAJOR DEPRESSIVE DISORDER, SINGLE EPISODE, IN REMISSION: Status: ACTIVE | Noted: 2025-01-07

## 2025-04-16 NOTE — PROGRESS NOTES
Hi Dr. Roman,    I did follow up with [facility] and they will fax the record over once is completed. It will take awhile yet for you to see in media as it takes a little longer for HIM to scan records in patient's chart. [facility] is pretty good at faxing records over for every patients they see for evaluation, please let know if you have not seen it yet in the next couple of weeks or so.      Thank you,  Karsten.

## 2025-04-22 ENCOUNTER — APPOINTMENT (OUTPATIENT)
Dept: INTERPRETER SERVICES | Facility: CLINIC | Age: 55
End: 2025-04-22
Payer: COMMERCIAL

## 2025-04-29 ENCOUNTER — OFFICE VISIT (OUTPATIENT)
Dept: FAMILY MEDICINE | Facility: CLINIC | Age: 55
End: 2025-04-29
Payer: COMMERCIAL

## 2025-04-29 VITALS
SYSTOLIC BLOOD PRESSURE: 148 MMHG | OXYGEN SATURATION: 96 % | BODY MASS INDEX: 33.95 KG/M2 | DIASTOLIC BLOOD PRESSURE: 80 MMHG | RESPIRATION RATE: 16 BRPM | WEIGHT: 150.9 LBS | TEMPERATURE: 98.4 F | HEIGHT: 56 IN | HEART RATE: 54 BPM

## 2025-04-29 DIAGNOSIS — H53.8 BLURRED VISION, LEFT EYE: Primary | ICD-10-CM

## 2025-04-29 DIAGNOSIS — I67.2 INTRACRANIAL ATHEROSCLEROSIS: ICD-10-CM

## 2025-04-29 DIAGNOSIS — H10.13 ACUTE ALLERGIC CONJUNCTIVITIS OF BOTH EYES: ICD-10-CM

## 2025-04-29 DIAGNOSIS — Z86.73 HISTORY OF STROKE: ICD-10-CM

## 2025-04-29 PROCEDURE — 3079F DIAST BP 80-89 MM HG: CPT

## 2025-04-29 PROCEDURE — 99213 OFFICE O/P EST LOW 20 MIN: CPT

## 2025-04-29 PROCEDURE — 3077F SYST BP >= 140 MM HG: CPT

## 2025-04-29 PROCEDURE — G2211 COMPLEX E/M VISIT ADD ON: HCPCS

## 2025-04-29 RX ORDER — ATORVASTATIN CALCIUM 80 MG/1
80 TABLET, FILM COATED ORAL EVERY EVENING
Qty: 90 TABLET | Refills: 3 | Status: SHIPPED | OUTPATIENT
Start: 2025-04-29

## 2025-04-29 RX ORDER — OLOPATADINE HYDROCHLORIDE 2 MG/ML
1 SOLUTION/ DROPS OPHTHALMIC DAILY
Qty: 2.5 ML | Refills: 0 | Status: SHIPPED | OUTPATIENT
Start: 2025-04-29

## 2025-04-29 NOTE — PROGRESS NOTES
Assessment & Plan    Blurred vision, left eye:  - Differentials considered include post-stroke visual changes, worsening intracranial atherosclerosis, and uncorrected refractive error. Post-stroke visual changes are deemed most likely due to the timing and nature of the symptoms, although reported new worsening over the last two weeks is concerning, prompting ophthalmology referral.  - Plan includes ordering a referral to ophthalmology for a more urgent evaluation and prescribing eye drops to help with itchiness.    - Adult Eye  Referral    Acute allergic conjunctivitis of both eyes:  - Differentials considered include allergic conjunctivitis, viral conjunctivitis, and bacterial conjunctivitis. Allergic conjunctivitis is deemed most likely due to the presence of itchiness and watery eyes.  - The diagnosis is supported by the patient's report of itchy and watery eyes.  - Prescribe eye drops to alleviate itchiness.    - olopatadine (PATADAY) 0.2 % ophthalmic solution  Dispense: 2.5 mL; Refill: 0    Intracranial atherosclerosis  - stable, medication refill requested, did appear to have refills on file.   - atorvastatin (LIPITOR) 80 MG tablet  Dispense: 90 tablet; Refill: 3    H/O Rt UZIEL & MCA stroke  - chronic, medication refilled   - atorvastatin (LIPITOR) 80 MG tablet  Dispense: 90 tablet; Refill: 3  - Adult Eye  Referral      Please seek immediate medical attention (go to the emergency room or urgent care) if symptoms worser or for concerning changes.    Follow-up with PCP for annual visit, if symptoms do not improve as anticipated, as needed for acute concern, and May schedule follow-up with me for blurred vision, stroke follow-up, medications ordered, referral placed, and labs ordered to if unable to see PCP due to scheduling availability     ---------------------------------------------------------------------------------------   Subjective   Paw is a 54 year old year old female, presenting for  the following health issues:  Chief Complaint   Patient presents with    Eye Problem         4/29/2025     9:20 AM   Additional Questions   Roomed by vonda williamson   Accompanied by Self     HPI  Rafael Colin, a 54-year-old female, reported experiencing blurry vision in her left eye since suffering a stroke on her left side in June 2024. She noted that the vision in her left eye has been progressively worsening, particularly over the past 2-3 weeks. Despite wearing glasses prescribed for both near and far vision, her left eye remains blurry, while her right eye is clear. She mentioned that her left eye has been itchy and watery, prompting her to rub it frequently, which exacerbates the watering. Rafael Colin has seen an eye doctor within the year, but she does not recall the exact date. During the visit, the eye doctor did not identify any issues with her eye, although she was unable to see anything during the vision test. The eye doctor prescribed eye drops (systane), but Rafael Colin feels they have not improved her vision. She expressed a desire for eye drops that would make her left eye feel wider and brighter. Additionally, she mentioned difficulty with her vision affecting her daily activities, such as needing to wear glasses at home to prevent her hair from falling into her food while eating. She also reported using atorvastatin, although the transcript does not specify the duration or reason for its use.        Patient submitted visit information  Answers submitted by the patient for this visit:   Provider Visit on 4/29/2025  9:45 AM with Greyson Donohue  General Questionnaire (Submitted on 4/29/2025)  Chief Complaint: Chronic problems general questions HPI Form  What is the reason for your visit today? : Eye problem  How many days per week do you miss taking your medication?: 0    ---------------------------------------------------------------------------------------   Objective    Vital signs: BP (!) 148/80   Pulse 54    "Temp 98.4  F (36.9  C) (Oral)   Resp 16   Ht 1.429 m (4' 8.25\")   Wt 68.4 kg (150 lb 14.4 oz)   LMP  (LMP Unknown)   SpO2 96%   BMI 33.53 kg/m      Body mass index is 33.53 kg/m .    Physical Exam    General appearance: alert, well appearing, and in no distress  Eyes: pupils equal and reactive, extraocular eye movements intact  Heart: normal rate, regular rhythm, normal S1, S2, no murmurs, rubs, clicks or gallops  Neurological: alert, oriented, normal speech, no focal findings or movement disorder noted     No results found for any visits on 04/29/25.  No results found for this or any previous visit (from the past 24 hours).    ---------------------------------------------------------------------------------------   Visit was completed along with virtual   Amount of time spent in chart review, direct patient contact, care coordination, and related activities to patient care on the day of appointment: 26 minutes.      Options for treatment and follow-up care were reviewed with the patient. Paw Bi and/or guardian was engaged and actively involved in the decision making process. Paw Bi and/or guardian verbalized understanding of the options discussed and was satisfied with the final plan.    The longitudinal plan of care for the diagnosis(es)/condition(s) as documented were addressed during this visit. Due to the added complexity in care, I can continue to support Paw in the subsequent management and with ongoing continuity of care related to these items if unable to see (or establish with) PCP.       Patient consented to use of ambient AI scribe prior to initiation of visit.    Signed Electronically by: ROMERO Harper CNP      "

## 2025-05-05 ENCOUNTER — THERAPY VISIT (OUTPATIENT)
Dept: OCCUPATIONAL THERAPY | Facility: REHABILITATION | Age: 55
End: 2025-05-05
Payer: COMMERCIAL

## 2025-05-05 DIAGNOSIS — R41.9 COGNITIVE COMPLAINTS: ICD-10-CM

## 2025-05-05 DIAGNOSIS — I63.9 MULTIPLE CEREBRAL INFARCTIONS (H): Primary | ICD-10-CM

## 2025-05-05 PROCEDURE — 97110 THERAPEUTIC EXERCISES: CPT | Mod: GO | Performed by: OCCUPATIONAL THERAPIST

## 2025-05-15 ENCOUNTER — PATIENT OUTREACH (OUTPATIENT)
Dept: CARE COORDINATION | Facility: CLINIC | Age: 55
End: 2025-05-15
Payer: COMMERCIAL

## 2025-05-15 NOTE — PROGRESS NOTES
Clinic Care Coordination Contact  Community Health Worker Follow Up    Care Gaps:   Health Maintenance Due   Topic Date Due    ADVANCE CARE PLANNING  Never done    DEPRESSION ACTION PLAN  Never done     Scheduled in August 2025.      Care Plan:   Care Plan: Establish care Completed 12/18/2024      Problem: est care  Resolved 12/18/2024      Goal: Patient would like to establish care with a new PCP in the next 6 months.  Completed 11/21/2024      Start Date: 6/20/2024 Expected End Date: 12/31/2024    This Visit's Progress: 100% Recent Progress: 60%    Note:     Barriers: language barrier, low literacy, noncompliance, and lack of knowledge how to navigate complex health care system  Strengths: motivated to attend appt  Patient expressed understanding of goal: Yes    Action steps to achieve this goal:  1. I will answer my phone when I am contacted to schedule my appointment.  2. I will attend my establish care appointment as scheduled on 8/22/2024 at 1:40pm. No showed  3. I will attend my rescheduled est care appointment with Dr Roman on 11/18/2024 at 3:10 pm. Completed.  4. CCC RN will assist patient to reschedule establish care with a provider while in the clinic.  5. I will follow up with CCC regarding this goal at each outreach until it is completed.                             Care Plan: Neurology Completed 11/4/2024      Problem: CVA  Resolved 11/4/2024      Goal: Patient will attend her neurology appointment in the next 6 months.  Completed 11/4/2024      Start Date: 6/20/2024 Expected End Date: 12/31/2024    This Visit's Progress: 100% Recent Progress: 50%    Note:     Barriers: language barrier, low literacy, noncompliance, and lack of knowledge how to navigate complex health care system  Strengths: motivated to attend appt  Patient expressed understanding of goal: Yes    Action steps to achieve this goal:  1. I will attend my neurology appointment as scheduled on 6/24/24 at 2:15pm. Completed.  2. I will attend  my 4 months follow up appointment with neurologist as scheduled on 10/10/2024 at 2:15 PM. Completed.  3. I will attend my follow up appointment in one year, 10/20/2025 at 1:45 PM.                             Care Plan: PCA service       Problem: imparied mobility       Goal: Patient would like to explore if she could qualify for PCA service in the next 12 months.       Start Date: 7/16/2024 Expected End Date: 7/31/2025    This Visit's Progress: 80% Recent Progress: 70%    Note:     Barriers: language  Strengths: Willing to accept CCC support,   Patient expressed understanding of goal: Yes    Action steps to achieve this goal:  1. I will answer my phone when [facility]  calls to schedule an in-person assessment. [facility] contacted patient on 5/14/2025 and scheduled for assessment on 5/20/2025 at 1:00 AM.  2. I will be home when assessment is scheduled.  3. I will update CCC team at outreach.     Notes: [facility] called patient and scheduled for in-person appointment on 5/20/2025 at 1:00 AM.                            Care Plan: PT Completed 12/18/2024      Problem: Impaired mobility  Resolved 12/18/2024      Goal: Patient will attend PT appointment in the next 4 months.  Completed 11/21/2024      Start Date: 8/26/2024 Expected End Date: 12/31/2024    This Visit's Progress: 100% Recent Progress: 40%    Note:     Barriers: language  Strengths: Willing to accept support  Patient expressed understanding of goal: Yes    Action steps to achieve this goal:  1. I will answer my phone when I am contacted to schedule my initial Physical Therapy appointment.  2. I will attend my initial Physical Therapy appointment on on 9/24/24 at 12:35pm. Attended.  3. I will follow up PT appointments as scheduled on 10/08/2024 at 1:50 PM  was no showed.   4. I will attend my follow up PT appointment on 10/22/24. Completed.   5. I will update CCC team at outreach.     Note: Patient is no longer interested in attending due to cold  weather and not helpful.                            Care Plan: DME Completed 2/20/2025      Problem: impaired mobility  Resolved 2/20/2025      Goal: Patient would like to explore if she could qualify for a cane and incontinence supply in the next 90 days.  Completed 2/20/2025      Start Date: 8/26/2024 Expected End Date: 12/31/2024    This Visit's Progress: 100% Recent Progress: 60%    Note:     Barriers: language   Strengths: Accepting of support  Patient expressed understanding of goal: Yes    Action steps to achieve this goal:  1. I will take hard copy to [facility] with support from my arm worker in the next 30 days. Thida  patient has not received it yet.  2. I will follow up with CCC in the next month regarding this goal for additional coordination support.    Note: [facility] will drop off care to patient's home.                             Care Plan: Neuropsychological evaluation       Problem: hx of stroke       Goal: I would like to get neruopsycological evaluation in the next 3 months.  Completed 4/14/2025      Start Date: 2/20/2025 Expected End Date: 5/28/2025    This Visit's Progress: 100% Recent Progress: 10%    Note:     Barriers: Language barrier and chronic illness.  Strengths: Willing to accept helps and able to communicate in own language.   Patient expressed understanding of goal: Yes.     Action steps to achieve this goal:  I attended neuropsychological evaluation as scheduled on 3/27/2025 at 9:00 AM.   I will received a copy of neuropsychological evaluation from [facility] once completed.  3.   I will update CCC team at outreach.                              Care Plan: Eye exam Completed 3/26/2025      Problem: HP GENERAL PROBLEM  Resolved 3/26/2025      Goal: I would like to get annual eye exam done in the next 30 days.  Completed 3/26/2025      Start Date: 2/20/2025 Expected End Date: 3/19/2025    This Visit's Progress: 100% Recent Progress: 10%    Note:     Barriers: Language  barrier and lack of community resources.   Strengths: Able to understand instruction and willing to accept helps   Patient expressed understanding of goal: Yes.     Action steps to achieve this goal:  1. I will attend my eye exam appointment as scheduled on 3/12/2025 at 2:55 PM. Completed. To return in 1 yr.  2. I will answer my phone when  calls for .  3. I will update [facility] team at outreach.      [facility]  [address]  [place] [address]  [phone]    -3/12/2025 2:55 PM, Cristina Morales                             Care Plan: Orthotics       Problem: HP GENERAL PROBLEM       Goal: I would like to be seen at orthotic clinic in the next 2 months.  Completed 5/15/2025      Start Date: 2/20/2025 Expected End Date: 4/30/2025    This Visit's Progress: 100% Recent Progress: 20%    Note:     Barriers: Language barrier and lack of community resources.  Strengths: Willing to accept help and has [facility] services.  Patient expressed understanding of goal: Yes.    Action steps to achieve this goal:  1. I will attend my ortho appointment as scheduled on 4/22/2025 at 9:00 AM fordelivery ASO ankle brace. Completed and received ankle brace and orthotic shoes.  2. I will reach out to [facility] team for additional questions or concerns.  3. I will update [facility] team at outreach.      Orthotics   [address]  [facility] [address]  Appt # [phone]  Clinic #[phone]                            Care Plan: OT and PT       Problem: imparied mobility       Goal: I would like to attend PT and OT in the next 90 days.       Start Date: 2/20/2025 Expected End Date: 5/30/2025    This Visit's Progress: 40% Recent Progress: 20%    Note:     Barriers: Language barrier and lack of community resources.  Strengths: Willing to accept help.  Patient expressed understanding of goal: Yes.    Action steps to achieve this goal:  1. I will attend my OT appointment as scheduled on 5/05/2025 at 8:30  PM, 5/12/25 at 10:00 am, 5/19/25 at 9:15 am and 5/28/25 at 9:15 am. Reminded patient and transportation requested with [employer].  2. I will schedule for more follow up if recommended and needed.  3. I will update CCC team at outreach.                             Care Plan: audiology and ENT       Problem: hearing loss       Goal: Patient will attend audiology and ENT appointments in the next 12 months.       Start Date: 2/20/2025 Expected End Date: 12/31/2025    This Visit's Progress: 40% Recent Progress: 30%    Note:     Barriers: language barrier, low literacy, noncompliance, and lack of knowledge how to navigate complex health care system  Strengths: motivated to attend appt  Patient expressed understanding of goal: Yes    Action steps to achieve this goal:  1. I will attend hearing aid fitting appointment on 5/22/2025 at 1:00 PM for hearing aid fitting. Reminded and transportation requested.  2. I will attend my initial hearing aid check on 6/12/2025 at 1:00 PM for hearing aid initial check. Reminded and transportation requested.  3. I will follow up with CCC regarding this goal at each outreach until it is completed.                           Intervention and Education during outreach:  -CHW reminded patient of upcoming appointments and transportation arranged as appropriated.  -Patient is scheduled for [facility] assessment on 5/20/2025 at 1 PM and patient's [facility] worker will help with next step process.  -Patient received ankle brace and orthotic shoes.  -Patient was informed to call with questions or concerns.    CHW Next Outreach: In one month.

## 2025-05-19 ENCOUNTER — THERAPY VISIT (OUTPATIENT)
Dept: OCCUPATIONAL THERAPY | Facility: REHABILITATION | Age: 55
End: 2025-05-19
Payer: COMMERCIAL

## 2025-05-19 DIAGNOSIS — R29.898 LEFT ARM WEAKNESS: ICD-10-CM

## 2025-05-19 DIAGNOSIS — R27.9 INCOORDINATION: ICD-10-CM

## 2025-05-19 DIAGNOSIS — I63.9 MULTIPLE CEREBRAL INFARCTIONS (H): Primary | ICD-10-CM

## 2025-05-19 DIAGNOSIS — R41.9 COGNITIVE COMPLAINTS: ICD-10-CM

## 2025-05-19 PROCEDURE — 97110 THERAPEUTIC EXERCISES: CPT | Mod: GO | Performed by: OCCUPATIONAL THERAPIST

## 2025-05-20 ENCOUNTER — TELEPHONE (OUTPATIENT)
Dept: FAMILY MEDICINE | Facility: CLINIC | Age: 55
End: 2025-05-20

## 2025-05-20 NOTE — TELEPHONE ENCOUNTER
5/28 (Physical Therapy)   & 6/17 (Occupation Therapy)  Needs transportation set up for upcoming appt.       Will route encounter to .    Thank you       Yossi Samano RN  [facility]

## 2025-05-22 ENCOUNTER — OFFICE VISIT (OUTPATIENT)
Dept: AUDIOLOGY | Facility: CLINIC | Age: 55
End: 2025-05-22
Payer: COMMERCIAL

## 2025-05-22 ENCOUNTER — PATIENT OUTREACH (OUTPATIENT)
Dept: CARE COORDINATION | Facility: CLINIC | Age: 55
End: 2025-05-22

## 2025-05-22 DIAGNOSIS — H90.3 ASYMMETRICAL SENSORINEURAL HEARING LOSS: Primary | ICD-10-CM

## 2025-05-22 NOTE — PROGRESS NOTES
Clinic Care Coordination Contact  Care Coordination Clinician Chart Review    Situation: Patient chart reviewed by Care Coordinator.       Background: Care Coordination Program started: 6/20/2024. Initial assessment completed and patient-centered care plan(s) were developed with participation from patient. Lead CC handed patient off to CHW for continued outreaches.       Assessment: Per chart review, patient outreach completed by CC CHW on 5/15/25.  Patient is actively working to accomplish goal(s). Patient's goal(s) appropriate and relevant at this time. Patient is not due for updated Plan of Care.  Assessments will be completed annually or as needed/with change of patient status.    PCA service   Per chart review, patient was scheduled for in home PCA assessment on 5/20/25. CCR[facility] tried patient today but unable to reach patient. CHW to follow-up on status at upcoming outreach. Goal up to date.     OT   Per chart reviewed patient attended her OT appts on 5/5/25 and 5/19/25 but no showed on 5/12/25. Patient is scheduled for OT follow-up appts on 5/28/25 and 6/17/25. Discharged from PT on 3/31/25. Goal updated today.     Audiology and ENT  Per chart review, patient is scheduled to follow up with audiologist today at 12:45 pm for hearing aid fitting and scheduled for hearing aid check on 6/12/25.. Goal up to date.       Care Plan: PCA service       Problem: imparied mobility       Goal: Patient would like to explore if she could qualify for PCA service in the next 12 months.       Start Date: 7/16/2024 Expected End Date: 7/31/2025    This Visit's Progress: 80% Recent Progress: 70%    Note:     Barriers: language  Strengths: Willing to accept CCC support,   Patient expressed understanding of goal: Yes    Action steps to achieve this goal:  1. I will answer my phone when [facility]  calls to schedule an in-person assessment. Lisa contacted patient on 5/14/2025 and scheduled for assessment on 5/20/2025 at 1:00  AM.  2. I will be home when assessment is scheduled.  3. I will update CCC team at outreach.     Notes: [facility] called patient and scheduled for in-person appointment on 5/20/2025 at 1:00 AM.                            Care Plan: OT       Problem: imparied mobility       Goal: I will attend outpatient OT in the next 90 days.       Start Date: 2/20/2025 Expected End Date: 5/30/2025    Recent Progress: 40%    Note:     Barriers: Language barrier and lack of community resources.  Strengths: Willing to accept help.  Patient expressed understanding of goal: Yes.    Action steps to achieve this goal:  1. I will attend my OT appointment as scheduled on   5/05/2025 at 8:30 PM Completed  5/12/25 at 10:00 am - no showed  5/19/25 at 9:15 am Completed  5/28/25 at 9:15 am. Reminded patient and transportation requested with [employer].  6/17/25 at 9:30 am     2. I will schedule for more follow up if recommended and needed.  3. I will update CCC team at outreach.                             Care Plan: audiology and ENT       Problem: hearing loss       Goal: Patient will attend audiology and ENT appointments in the next 12 months.       Start Date: 2/20/2025 Expected End Date: 12/31/2025    This Visit's Progress: 40% Recent Progress: 30%    Note:     Barriers: language barrier, low literacy, noncompliance, and lack of knowledge how to navigate complex health care system  Strengths: motivated to attend appt  Patient expressed understanding of goal: Yes    Action steps to achieve this goal:  1. I will attend hearing aid fitting appointment on 5/22/2025 at 1:00 PM for hearing aid fitting. Reminded and transportation requested.  2. I will attend my initial hearing aid check on 6/12/2025 at 1:00 PM for hearing aid initial check. Reminded and transportation requested.  3. I will follow up with [facility] regarding this goal at each outreach until it is completed.                                  Plan/Recommendations: The patient will  continue working with Care Coordination to achieve goal(s) as above. CHW will continue outreaches at minimum every 30 days and will involve Lead CC as needed or if patient is ready to move to Maintenance. Lead CC will continue to monitor CHW outreaches and patient's progress to goal(s) every 6 weeks.     Plan of Care updated and sent to patient: No

## 2025-05-28 ENCOUNTER — THERAPY VISIT (OUTPATIENT)
Dept: OCCUPATIONAL THERAPY | Facility: REHABILITATION | Age: 55
End: 2025-05-28
Payer: COMMERCIAL

## 2025-05-28 DIAGNOSIS — H10.13 ACUTE ALLERGIC CONJUNCTIVITIS OF BOTH EYES: ICD-10-CM

## 2025-05-28 DIAGNOSIS — I63.9 MULTIPLE CEREBRAL INFARCTIONS (H): ICD-10-CM

## 2025-05-28 DIAGNOSIS — R29.898 LEFT ARM WEAKNESS: ICD-10-CM

## 2025-05-28 DIAGNOSIS — R27.9 INCOORDINATION: ICD-10-CM

## 2025-05-28 DIAGNOSIS — R41.9 COGNITIVE COMPLAINTS: Primary | ICD-10-CM

## 2025-05-28 PROCEDURE — 97110 THERAPEUTIC EXERCISES: CPT | Mod: GO | Performed by: OCCUPATIONAL THERAPIST

## 2025-05-28 RX ORDER — OLOPATADINE HYDROCHLORIDE 2 MG/ML
1 SOLUTION OPHTHALMIC DAILY
Qty: 2.5 ML | Refills: 1 | Status: SHIPPED | OUTPATIENT
Start: 2025-05-28

## 2025-05-28 NOTE — TELEPHONE ENCOUNTER
Kristi Adams MD [facility] - [facility]  Hi - could you please confirm what she needs to be refilled? Thanks!       ----- Message -----  From: Callie Nino, OT  Sent: 5/28/2025  10:25 AM CDT  To: MD Brittni Anglin,    I saw Paw in OT today and she is needing someone to call her to talk about eye drops-she is out of refills and not sure how to reach the clinic/provider. I told her I would send you a message so someone could reach out to her.    Thank you,    Willa Nino, OTR/L          Medication Question or Refill        What medication are you calling about (include dose and sig)?: Pataday eye gtt    Preferred Pharmacy:   [facility] - [address] [address]  Phone: [phone] Fax: [phone]       Controlled Substance Agreement on file:   CSA -- Patient Level:    CSA: None found at the patient level.       Who prescribed the medication?: RJ    Do you need a refill? Yes     Will route to RN refill pool.    Yossi Samano RN   [facility]

## 2025-05-28 NOTE — PROGRESS NOTES
[facility]                                                                                   OUTPATIENT OCCUPATIONAL THERAPY    PLAN OF TREATMENT FOR OUTPATIENT REHABILITATION   Patient's Last Name, First Name, Rafael Wayne    YOB: 1970   Provider's Name   [facility]   Medical Record No.  [id number]     Onset Date: 03/24/25 Start of Care Date: 03/24/25     Medical Diagnosis:  Cerebral infarcts      OT Treatment Diagnosis:  left arm weakness, incoordination, impaired ADL and IADL skills Plan of Treatment  Frequency/Duration:1x per week with ability to addor taper as clinically indicated/12 weeks    Certification date from 05/25/25   To 08/17/25        See note for plan of treatment details and functional goals     Callie Nino OT                         I CERTIFY THE NEED FOR THESE SERVICES FURNISHED UNDER        THIS PLAN OF TREATMENT AND WHILE UNDER MY CARE     (Physician attestation of this document indicates review and certification of the therapy plan).              Referring Provider:  Kristi Adams MD    Initial Assessment  See Epic Evaluation- 03/24/25          PLAN  Continue therapy per current plan of care.    Beginning/End Dates of Progress Note Reporting Period:  03/24/25 to 05/28/2025    Referring Provider:  Kristi Adams MD       05/28/25 0500   Appointment Info   Treating Provider Willa Nino OTR/L   Visits Used 4   Medical Diagnosis Cerebral infarcts   OT Tx Diagnosis left arm weakness, incoordination, impaired ADL and IADL skills   Precautions/Limitations falls   Other pertinent information Stroke in June 2024. Pt is training with PT with a new cane and is also waiting for a new LE orthotic.   Progress Note/Certification   Start Of Care Date 03/24/25   Onset of Illness/Injury or Date of Surgery 03/24/25   Therapy Frequency 1x per week with ability to addor taper as clinically  indicated   Predicted Duration 12 weeks   Certification date from 05/25/25   Certification date to 08/17/25   Progress Note Due Date 08/17/25   Progress Note Completed Date 05/28/25       Present Yes    Language Claudia    ID or First/Last Name [facility] ID [id number]   OT Goal 1   Goal Identifier 1   Goal Description Pt will demonstrate increased strength in L UE (to 3+ thoughout) as indicated by returning to using dominant L hand for cleaning tasks like dusting, washing surfaces, organizing belongings.   Goal Progress making progress. Extend goal   Target Date 08/17/25   OT Goal 2   Goal Identifier 2   Goal Description Pt will be able to state 3 adaptive techniques/equipment for maximizing Indep with toileting and dressing.   Goal Progress making progress. Extend goal   Target Date 05/24/25   OT Goal 3   Goal Identifier left  strength   Goal Description Patient to demonstrate independence with HEP for L UE strength and measure improved L  strength by at least 5#s for increased ADL/IADL independence (hanging onto objects during hygiene and grooming, cutting food, completing B UE tasks, etc.).   Goal Progress making progress. Extend goal   Target Date 08/17/25   OT Goal 4   Goal Identifier left pinch strength   Goal Description Patient will demonstrate increased left hand pinch strength (both lateral and palmar) by 2# each for increased independence with ADL/IADLs such as opening containers, pull up pants, maintaining pinch to hold items, etc.   Goal Progress making progress. Extend goal   Target Date 08/17/25   OT Goal 5   Goal Identifier left fm coordination   Goal Description Patient to demonstrate improved L  coordination skills by demonstrating  improved 9-Hole Peg Test score for increased performance with FM ADLs (manipulating buttons, zippers manipulating buttons, opening containers and writing etc.)   Goal Progress making progress. Extend goal   Target Date 08/17/25    Subjective Report   Subjective Report Patient is happy to be here in OT working on her left arm and hand   Objective Measure 1   Objective Measure Right UE AROM/Strength   Details 5-19-25: WNL   Objective Measure 2   Objective Measure left shoulder flexion-AROM/strength   Details 5-19-25: 140/ 3   Objective Measure 3   Objective Measure left elbow flexion-AROM/strength   Details 5-19-25: 133/ 3+   Objective Measure 4   Objective Measure left elbow extension-AROM/strength   Details 5-19-25: -20/ 3   Objective Measure 5   Objective Measure left wrist flexion-AROM/strength   Details 5-19-25: 33/ 3+   Objective Measure 6   Objective Measure left elbow extension-AROM/strength   Details 5-19-25: 29/ 3+   Objective Measure 7   Objective Measure left pronation/supination-AROM   Details 5-19-25: WFL with mild pain   Objective Measure 8   Objective Measure POG-right/left   Details 5-19-25: 41#/4#   Objective Measure 9   Objective Measure lateral pinch-right/left   Details 5-19-25: 14#/1#   Objective Measure 10   Objective Measure 3 pt pinch-right/left   Details 5-19-25: 7#/1#   Objective Measure 11   Objective Measure 9 HPT-right/left   Details 5-19-25: 26.25 seconds/36.79 seconds   Therapeutic Procedure/Exercise   Therapeutic Procedure: strength, endurance, ROM, flexibillity minutes (38869) 38   Ther Proc 1 left UE strength HEP   Ther Proc 1 - Details New measurements of left UE with some improvement. Patient only performing left UE HEP(PTRX) about 3 times a week. OT encouraged patient to perform this daily as left UE has a good amount of function. Patient guards left UE and possibly showing inconsisten effort. HEP also includes pinch strengthening using blue foam block. Patient performed left UE ex's in clinic with moderate encouragement. In clinic today, patient able to stack plastic cones and perform fine motor tasks placing small metal pegs into board. Able to use tweezers to remove pegs. Patient also able to perform  UBE 4 minutes X2 sets   Skilled Intervention Instruct on UE HEP to improve AROM/strength for increased ease and independence with ADL and IADL s   Education   Learner/Method Patient;Family  (niece)   Plan   Plan for next session f/u on UE strength HEP(PTRX). (Home exercises to complete with [facility] worker assisting.), Build fm coordination HEP. functional UE ex's in clinic   Total Session Time   Timed Code Treatment Minutes 38   Total Treatment Time (sum of timed and untimed services) 38

## 2025-06-03 NOTE — TELEPHONE ENCOUNTER
"Writer attempt #1 to call patient with the help of a \"Claudia\"  (ID #[id number]) regarding clinician's message below. Clinician's message relayed to patient.    Patient verbalizes understanding, agrees with plan and has no further questions.    TEENA VelardeN, RN, PHN   [facility]    "

## 2025-06-09 ENCOUNTER — OFFICE VISIT (OUTPATIENT)
Dept: FAMILY MEDICINE | Facility: CLINIC | Age: 55
End: 2025-06-09
Payer: COMMERCIAL

## 2025-06-09 VITALS
HEIGHT: 56 IN | OXYGEN SATURATION: 98 % | TEMPERATURE: 98 F | SYSTOLIC BLOOD PRESSURE: 118 MMHG | BODY MASS INDEX: 33.25 KG/M2 | DIASTOLIC BLOOD PRESSURE: 60 MMHG | WEIGHT: 147.8 LBS | RESPIRATION RATE: 16 BRPM | HEART RATE: 64 BPM

## 2025-06-09 DIAGNOSIS — J30.89 NON-SEASONAL ALLERGIC RHINITIS, UNSPECIFIED TRIGGER: ICD-10-CM

## 2025-06-09 DIAGNOSIS — R05.9 COUGH, UNSPECIFIED TYPE: Primary | ICD-10-CM

## 2025-06-09 DIAGNOSIS — R68.89 SENSE OF BEING COLD: ICD-10-CM

## 2025-06-09 DIAGNOSIS — H10.13 ACUTE ALLERGIC CONJUNCTIVITIS OF BOTH EYES: ICD-10-CM

## 2025-06-09 DIAGNOSIS — Z86.73 HISTORY OF STROKE: ICD-10-CM

## 2025-06-09 LAB
ALBUMIN SERPL BCG-MCNC: 4.3 G/DL (ref 3.5–5.2)
ALP SERPL-CCNC: 89 U/L (ref 40–150)
ALT SERPL W P-5'-P-CCNC: 31 U/L (ref 0–50)
ANION GAP SERPL CALCULATED.3IONS-SCNC: 10 MMOL/L (ref 7–15)
AST SERPL W P-5'-P-CCNC: 30 U/L (ref 0–45)
BILIRUB SERPL-MCNC: 0.4 MG/DL
BUN SERPL-MCNC: 7.9 MG/DL (ref 6–20)
CALCIUM SERPL-MCNC: 9.5 MG/DL (ref 8.8–10.4)
CHLORIDE SERPL-SCNC: 107 MMOL/L (ref 98–107)
CREAT SERPL-MCNC: 0.63 MG/DL (ref 0.51–0.95)
CRP SERPL-MCNC: 5.47 MG/L
EGFRCR SERPLBLD CKD-EPI 2021: >90 ML/MIN/1.73M2
ERYTHROCYTE [DISTWIDTH] IN BLOOD BY AUTOMATED COUNT: 12.7 % (ref 10–15)
GLUCOSE SERPL-MCNC: 93 MG/DL (ref 70–99)
HCO3 SERPL-SCNC: 26 MMOL/L (ref 22–29)
HCT VFR BLD AUTO: 38.4 % (ref 35–47)
HGB BLD-MCNC: 13.1 G/DL (ref 11.7–15.7)
MCH RBC QN AUTO: 28.8 PG (ref 26.5–33)
MCHC RBC AUTO-ENTMCNC: 34.1 G/DL (ref 31.5–36.5)
MCV RBC AUTO: 84 FL (ref 78–100)
PLATELET # BLD AUTO: 237 10E3/UL (ref 150–450)
POTASSIUM SERPL-SCNC: 4.4 MMOL/L (ref 3.4–5.3)
PROT SERPL-MCNC: 7.9 G/DL (ref 6.4–8.3)
RBC # BLD AUTO: 4.55 10E6/UL (ref 3.8–5.2)
SODIUM SERPL-SCNC: 143 MMOL/L (ref 135–145)
WBC # BLD AUTO: 4.5 10E3/UL (ref 4–11)

## 2025-06-09 PROCEDURE — 3074F SYST BP LT 130 MM HG: CPT

## 2025-06-09 PROCEDURE — G2211 COMPLEX E/M VISIT ADD ON: HCPCS

## 2025-06-09 PROCEDURE — 85027 COMPLETE CBC AUTOMATED: CPT

## 2025-06-09 PROCEDURE — 36415 COLL VENOUS BLD VENIPUNCTURE: CPT

## 2025-06-09 PROCEDURE — 80053 COMPREHEN METABOLIC PANEL: CPT

## 2025-06-09 PROCEDURE — 3078F DIAST BP <80 MM HG: CPT

## 2025-06-09 PROCEDURE — 99214 OFFICE O/P EST MOD 30 MIN: CPT

## 2025-06-09 PROCEDURE — 86140 C-REACTIVE PROTEIN: CPT

## 2025-06-09 RX ORDER — FLUTICASONE PROPIONATE 50 MCG
1 SPRAY, SUSPENSION (ML) NASAL DAILY
Qty: 18.2 ML | Refills: 1 | Status: SHIPPED | OUTPATIENT
Start: 2025-06-09

## 2025-06-09 RX ORDER — CETIRIZINE HYDROCHLORIDE 10 MG/1
10 TABLET ORAL DAILY
Qty: 30 TABLET | Refills: 5 | Status: SHIPPED | OUTPATIENT
Start: 2025-06-09

## 2025-06-09 RX ORDER — LOSARTAN POTASSIUM 25 MG/1
25 TABLET ORAL DAILY
Qty: 90 TABLET | Refills: 3 | Status: SHIPPED | OUTPATIENT
Start: 2025-06-09

## 2025-06-09 RX ORDER — OLOPATADINE HYDROCHLORIDE 2 MG/ML
1 SOLUTION OPHTHALMIC DAILY
Qty: 2.5 ML | Refills: 3 | Status: SHIPPED | OUTPATIENT
Start: 2025-06-09

## 2025-06-09 NOTE — PROGRESS NOTES
"Assessment & Plan     Cough, unspecified type:  Non-seasonal allergic rhinitis, unspecified trigger:  Sense of being cold  - Cough has persisted for a couple of months, with no signs of pneumonia as lungs sound clear.  Regimens include chronic lung etiology such as asthma or COPD, pneumonia post upper respiratory infection, or possible irritation from nasal drip.  Given lungs clear upon auscultation today, no systemic symptoms, and no recent illness pneumonia, COPD, and asthma less likely.  - Add a nasal spray to reduce nasal drip, one spray in each nostril daily. Check blood work to rule out other infections given \"couple days sensation of being cold.\"  Will refill other allergy related medications.  Send medication refills to [facility] pharmacy.    - cetirizine (ZYRTEC) 10 MG tablet  Dispense: 30 tablet; Refill: 5  - olopatadine (PATADAY) 0.2 % ophthalmic solution  Dispense: 2.5 mL; Refill: 3  - fluticasone (FLONASE) 50 MCG/ACT nasal spray  Dispense: 18.2 mL; Refill: 1  - CBC with platelets  - CRP, inflammation  - Comprehensive metabolic panel (BMP + Alb, Alk Phos, ALT, AST, Total. Bili, TP)    Acute allergic conjunctivitis of both eyes:  - Blurry vision noted, referral to eye doctor recommended.  Previously decided not to go to eye doctor despite blurry vision that may be worsening as someone told her that only 1 eye visit a year would be covered by insurance.  Is concerned of cost and seems unwilling to change on the position unless she is sure that she will not be charged for visit.  Will forward to care coordination to assist with this process.  - Referral to eye doctor for further evaluation. Care coordination team to assist with insurance and appointment setup.    H/O Rt UZIEL & MCA stroke  -Chronic.  Blood pressure well-controlled on current medication dose.  Will refill  - losartan (COZAAR) 25 MG tablet  Dispense: 90 tablet; Refill: 3      Please seek immediate medical attention (go to the emergency room or " urgent care) if symptoms worser or for concerning changes.    Follow-up if symptoms do not improve as anticipated, as needed for acute concern, and May schedule follow-up with me for chronic conditions discussed today, referrals placed today, medications ordered today, if unable to see PCP due to scheduling availability       Sourav Hall is a 54 year old year old female, presenting for the following health issues:  Chief Complaint   Patient presents with    Cough    Tinnitus    vision problms      Had stroke 2024  vision in left eye is getting worse          6/9/2025     8:09 AM   Additional Questions   Roomed by sangeeta Hall Cristi, a 54-year-old female, reported experiencing a persistent cough for several weeks. She described the cough as similar to flu symptoms and noted that it occurs throughout the day, including nighttime and evenings. The cough has not improved over time and remains consistent. She also mentioned coughing up a significant amount of thick mucus, but there is no presence of blood. Additionally, she has been experiencing a runny and itchy nose, which only runs when she coughs.  Denies fever, shortness of breath, dyspnea on exertion, chest pain, sinus pressure, or fever      Rafael Colin has been out of her eye drops and allergy pills, indicating a need for refills. She also mentioned having blurry vision and has been referred to an eye doctor, although she has not yet made an appointment. There is no mention of any other symptoms or concerns prior to this encounter.        Patient submitted visit information  Answers submitted by the patient for this visit:  Provider Visit on 6/9/2025  8:15 AM with Greyson Donohue  General Questionnaire (Submitted on 6/9/2025)  Chief Complaint: Chronic problems general questions HPI Form  General Concern (Submitted on 6/9/2025)  Chief Complaint: Chronic problems general questions HPI Form  What is the reason for your visit today?: cough  ringing in ear    "unable to see out of  left eye  had stroke in 2024 but vision is worse      Objective     Vital signs: /60   Pulse 64   Temp 98  F (36.7  C) (Oral)   Resp 16   Ht 1.429 m (4' 8.26\")   Wt 67 kg (147 lb 12.8 oz)   LMP  (LMP Unknown)   SpO2 98%   BMI 32.83 kg/m      Body mass index is 32.83 kg/m .    Physical Exam    General appearance: alert, well appearing, and in no distress  Mental status: alert, oriented to person, place, and time  Eyes: pupils equal and reactive, extraocular eye movements intact  Nose: normal and patent, no erythema, discharge or polyps  Mouth: mucous membranes moist, pharynx normal without lesions  Lungs: clear to auscultation, no wheezes, rales or rhonchi, symmetric air entry  Heart: normal rate, regular rhythm, normal S1, S2, no murmurs, rubs, clicks or gallops  Musculoskeletal: no joint tenderness, deformity or swelling  Extremities: peripheral pulses normal, no pedal edema, no clubbing or cyanosis  Skin: normal coloration and turgor, no rashes, no suspicious skin lesions noted       Results for orders placed or performed in visit on 06/09/25   CBC with platelets     Status: Normal   Result Value Ref Range    WBC Count 4.5 4.0 - 11.0 10e3/uL    RBC Count 4.55 3.80 - 5.20 10e6/uL    Hemoglobin 13.1 11.7 - 15.7 g/dL    Hematocrit 38.4 35.0 - 47.0 %    MCV 84 78 - 100 fL    MCH 28.8 26.5 - 33.0 pg    MCHC 34.1 31.5 - 36.5 g/dL    RDW 12.7 10.0 - 15.0 %    Platelet Count 237 150 - 450 10e3/uL     Results for orders placed or performed in visit on 06/09/25 (from the past 24 hours)   CBC with platelets   Result Value Ref Range    WBC Count 4.5 4.0 - 11.0 10e3/uL    RBC Count 4.55 3.80 - 5.20 10e6/uL    Hemoglobin 13.1 11.7 - 15.7 g/dL    Hematocrit 38.4 35.0 - 47.0 %    MCV 84 78 - 100 fL    MCH 28.8 26.5 - 33.0 pg    MCHC 34.1 31.5 - 36.5 g/dL    RDW 12.7 10.0 - 15.0 %    Platelet Count 237 150 - 450 10e3/uL            Visit was completed along with virtual   Amount of " time spent in chart review, direct patient contact, care coordination, and related activities to patient care on the day of appointment: 33 minutes.      Options for treatment and follow-up care were reviewed with the patient. Paw Bi and/or guardian was engaged and actively involved in the decision making process. Paw Bi and/or guardian verbalized understanding of the options discussed and was satisfied with the final plan.    The longitudinal plan of care for the diagnosis(es)/condition(s) as documented were addressed during this visit. Due to the added complexity in care, I can continue to support Paw in the subsequent management and with ongoing continuity of care related to these items if unable to see (or establish with) PCP.       Patient consented to use of ambient AI scribe prior to initiation of visit.    Signed Electronically by: ROMERO Harper CNP

## 2025-06-10 ENCOUNTER — RESULTS FOLLOW-UP (OUTPATIENT)
Dept: FAMILY MEDICINE | Facility: CLINIC | Age: 55
End: 2025-06-10

## 2025-06-11 ENCOUNTER — TELEPHONE (OUTPATIENT)
Dept: FAMILY MEDICINE | Facility: CLINIC | Age: 55
End: 2025-06-11

## 2025-06-12 ENCOUNTER — OFFICE VISIT (OUTPATIENT)
Dept: AUDIOLOGY | Facility: CLINIC | Age: 55
End: 2025-06-12
Payer: COMMERCIAL

## 2025-06-12 ENCOUNTER — PATIENT OUTREACH (OUTPATIENT)
Dept: CARE COORDINATION | Facility: CLINIC | Age: 55
End: 2025-06-12

## 2025-06-12 DIAGNOSIS — H90.3 ASYMMETRICAL SENSORINEURAL HEARING LOSS: Primary | ICD-10-CM

## 2025-06-12 NOTE — PROGRESS NOTES
AUDIOLOGY REPORT    SUMMARY: Audiology visit completed. See audiogram for results. Claudia  present for appointment.    RECOMMENDATIONS: Follow-up with managing audiologist. Patient returned left hearing aid due to change in hearing thresholds.       ANISH Lopez.  Audiology Doctoral Extern     I was present with the patient for the entire Audiology appointment, including all procedures/testing performed by the Au[employer]STEPHANI student, and agree with the student s assessment and plan as documented.     Dandre Poole  Audiologist  [place] License  #[id number]

## 2025-06-12 NOTE — PROGRESS NOTES
Clinic Care Coordination Contact  Follow Up Progress Note   -CHW called [facility] eye clinic and cancelled the appointment that was scheduled on 6/16/2025 due to patient was already seen at [facility] on 3/12/2025 and insurance will not pay for another eye exam and only allow 1 time per calendar year.

## 2025-06-12 NOTE — PROGRESS NOTES
Clinic Care Coordination Contact  Community Health Worker Follow Up    Care Gaps:   Health Maintenance Due   Topic Date Due    ADVANCE CARE PLANNING  Never done    DEPRESSION ACTION PLAN  Never done     Care Gaps Last addressed on 6/09/2025.    Care Plan:   Care Plan: Establish care Completed 12/18/2024      Problem: est care  Resolved 12/18/2024      Goal: Patient would like to establish care with a new PCP in the next 6 months.  Completed 11/21/2024      Start Date: 6/20/2024 Expected End Date: 12/31/2024    This Visit's Progress: 100% Recent Progress: 60%    Note:     Barriers: language barrier, low literacy, noncompliance, and lack of knowledge how to navigate complex health care system  Strengths: motivated to attend appt  Patient expressed understanding of goal: Yes    Action steps to achieve this goal:  1. I will answer my phone when I am contacted to schedule my appointment.  2. I will attend my establish care appointment as scheduled on 8/22/2024 at 1:40pm. No showed  3. I will attend my rescheduled est care appointment with Dr Roman on 11/18/2024 at 3:10 pm. Completed.  4. CCC RN will assist patient to reschedule establish care with a provider while in the clinic.  5. I will follow up with CCC regarding this goal at each outreach until it is completed.                             Care Plan: Neurology Completed 11/4/2024      Problem: CVA  Resolved 11/4/2024      Goal: Patient will attend her neurology appointment in the next 6 months.  Completed 11/4/2024      Start Date: 6/20/2024 Expected End Date: 12/31/2024    This Visit's Progress: 100% Recent Progress: 50%    Note:     Barriers: language barrier, low literacy, noncompliance, and lack of knowledge how to navigate complex health care system  Strengths: motivated to attend appt  Patient expressed understanding of goal: Yes    Action steps to achieve this goal:  1. I will attend my neurology appointment as scheduled on 6/24/24 at 2:15pm. Completed.  2. I  will attend my 4 months follow up appointment with neurologist as scheduled on 10/10/2024 at 2:15 PM. Completed.  3. I will attend my follow up appointment in one year, 10/20/2025 at 1:45 PM.                             Care Plan: PCA service       Problem: imparied mobility       Goal: Patient would like to explore if she could qualify for PCA service in the next 12 months.  Completed 6/12/2025      Start Date: 7/16/2024 Expected End Date: 7/31/2025    This Visit's Progress: 100% Recent Progress: 80%    Note:     Barriers: language  Strengths: Willing to accept CCC support,   Patient expressed understanding of goal: Yes    Action steps to achieve this goal:  1. I will answer my phone when [facility]  calls to schedule an in-person assessment. [facility] contacted patient on 5/14/2025 and scheduled for assessment on 5/20/2025 at 1:00 AM.  2. I will be home when assessment is scheduled. Completed.  3. I will update CCC team at outreach.     Notes: Patient is receiving 4 hours of PCA services per day.                             Care Plan: PT Completed 12/18/2024      Problem: Impaired mobility  Resolved 12/18/2024      Goal: Patient will attend PT appointment in the next 4 months.  Completed 11/21/2024      Start Date: 8/26/2024 Expected End Date: 12/31/2024    This Visit's Progress: 100% Recent Progress: 40%    Note:     Barriers: language  Strengths: Willing to accept support  Patient expressed understanding of goal: Yes    Action steps to achieve this goal:  1. I will answer my phone when I am contacted to schedule my initial Physical Therapy appointment.  2. I will attend my initial Physical Therapy appointment on on 9/24/24 at 12:35pm. Attended.  3. I will follow up PT appointments as scheduled on 10/08/2024 at 1:50 PM  was no showed.   4. I will attend my follow up PT appointment on 10/22/24. Completed.   5. I will update CCC team at outreach.     Note: Patient is no longer interested in attending due to  cold weather and not helpful.                            Care Plan: DME Completed 2/20/2025      Problem: impaired mobility  Resolved 2/20/2025      Goal: Patient would like to explore if she could qualify for a cane and incontinence supply in the next 90 days.  Completed 2/20/2025      Start Date: 8/26/2024 Expected End Date: 12/31/2024    This Visit's Progress: 100% Recent Progress: 60%    Note:     Barriers: language   Strengths: Accepting of support  Patient expressed understanding of goal: Yes    Action steps to achieve this goal:  1. I will take hard copy to [facility] with support from my arm worker in the next 30 days. Thida  patient has not received it yet.  2. I will follow up with CCC in the next month regarding this goal for additional coordination support.    Note: [facility] will drop off care to patient's home.                             Care Plan: Neuropsychological evaluation Completed 5/22/2025      Problem: hx of stroke  Resolved 5/22/2025      Goal: I would like to get neruopsycological evaluation in the next 3 months.  Completed 4/14/2025      Start Date: 2/20/2025 Expected End Date: 5/28/2025    This Visit's Progress: 100% Recent Progress: 10%    Note:     Barriers: Language barrier and chronic illness.  Strengths: Willing to accept helps and able to communicate in own language.   Patient expressed understanding of goal: Yes.     Action steps to achieve this goal:  I attended neuropsychological evaluation as scheduled on 3/27/2025 at 9:00 AM.   I will received a copy of neuropsychological evaluation from [facility] once completed.  3.   I will update CCC team at outreach.                              Care Plan: Eye exam Completed 3/26/2025      Problem: HP GENERAL PROBLEM  Resolved 3/26/2025      Goal: I would like to get annual eye exam done in the next 30 days.  Completed 3/26/2025      Start Date: 2/20/2025 Expected End Date: 3/19/2025    This Visit's Progress: 100% Recent  Progress: 10%    Note:     Barriers: Language barrier and lack of community resources.   Strengths: Able to understand instruction and willing to accept helps   Patient expressed understanding of goal: Yes.     Action steps to achieve this goal:  1. I will attend my eye exam appointment as scheduled on 3/12/2025 at 2:55 PM. Completed. To return in 1 yr.  2. I will answer my phone when  calls for .  3. I will update [facility] team at outreach.      [place] [facility]  [address]  [place] [address]  [phone]    -3/12/2025 2:55 PM, Cristina Morales                             Care Plan: Orthotics Completed 5/22/2025      Problem: HP GENERAL PROBLEM  Resolved 5/22/2025      Goal: I would like to be seen at orthotic clinic in the next 2 months.  Completed 5/15/2025      Start Date: 2/20/2025 Expected End Date: 4/30/2025    This Visit's Progress: 100% Recent Progress: 20%    Note:     Barriers: Language barrier and lack of community resources.  Strengths: Willing to accept help and has [facility] services.  Patient expressed understanding of goal: Yes.    Action steps to achieve this goal:  1. I will attend my ortho appointment as scheduled on 4/22/2025 at 9:00 AM fordelivery ASO ankle brace. Completed and received ankle brace and orthotic shoes.  2. I will reach out to [facility] team for additional questions or concerns.  3. I will update [facility] team at outreach.      Orthotics   [address]  [facility] [address]  Appt # [phone]  Clinic #[phone]                            Care Plan: OT       Problem: imparied mobility       Goal: I will attend outpatient OT in the next 90 days.       Start Date: 2/20/2025 Expected End Date: 5/30/2025    This Visit's Progress: 50% Recent Progress: 40%    Note:     Barriers: Language barrier and lack of community resources.  Strengths: Willing to accept help.  Patient expressed understanding of goal: Yes.    Action steps to achieve this  goal:  1. I will attend my OT appointment as scheduled on   5/05/2025 at 8:30 PM Completed  5/12/25 at 10:00 am - no showed  5/19/25 at 9:15 am Completed  5/28/25 at 9:15 am. Reminded patient and transportation requested with [employer].  6/17/25 at 9:30 am Reminded and ride set up.  6/24/2025 at 12:30 PM. Ride requested and reminded patient.  7/01/2025 at 12:30 PM. Ride requested and reminded patient.  7/08/2025 at 12:30 PM. Ride requested and reminded patient.     2. I will schedule for more follow up if recommended and needed.  3. I will update CCC team at outreach.                             Care Plan: audiology and ENT       Problem: hearing loss       Goal: Patient will attend audiology and ENT appointments in the next 12 months.       Start Date: 2/20/2025 Expected End Date: 12/31/2025    This Visit's Progress: 50% Recent Progress: 40%    Note:     Barriers: language barrier, low literacy, noncompliance, and lack of knowledge how to navigate complex health care system  Strengths: motivated to attend appt  Patient expressed understanding of goal: Yes    Action steps to achieve this goal:  1. I will attend hearing aid fitting appointment on 5/22/2025 at 1:00 PM for hearing aid fitting. Reminded and transportation requested.  2. I will attend my initial hearing aid check on 6/12/2025 at 1:00 PM for hearing aid initial check. Reminded and transportation requested.  3. I will follow up with CCC regarding this goal at each outreach until it is completed.                           Intervention and Education during outreach:  -Reminded patient of upcoming appointments and transportation arranged as appropriated.  -Patient is receiving PCA services 4 hours per day and patient does not remember PCA name but will call CHW back once her [facility] comes for home visit.  -Patient is scheduled for annual eye exam at [facility] on 6/16/2025 at 11:10 AM and transportation is requested with [employer] [phone].      [facility]  [address] [place] [address]  [phone]    CHW Next Outreach: In one month.

## 2025-06-17 ENCOUNTER — THERAPY VISIT (OUTPATIENT)
Dept: OCCUPATIONAL THERAPY | Facility: REHABILITATION | Age: 55
End: 2025-06-17
Payer: COMMERCIAL

## 2025-06-17 DIAGNOSIS — R29.898 LEFT ARM WEAKNESS: Primary | ICD-10-CM

## 2025-06-17 PROCEDURE — 97110 THERAPEUTIC EXERCISES: CPT | Mod: GO | Performed by: OCCUPATIONAL THERAPY ASSISTANT

## 2025-06-24 ENCOUNTER — THERAPY VISIT (OUTPATIENT)
Dept: OCCUPATIONAL THERAPY | Facility: REHABILITATION | Age: 55
End: 2025-06-24
Payer: COMMERCIAL

## 2025-06-24 DIAGNOSIS — R27.9 INCOORDINATION: Primary | ICD-10-CM

## 2025-06-24 PROCEDURE — 97530 THERAPEUTIC ACTIVITIES: CPT | Mod: GO | Performed by: OCCUPATIONAL THERAPY ASSISTANT

## 2025-06-24 PROCEDURE — 97110 THERAPEUTIC EXERCISES: CPT | Mod: GO | Performed by: OCCUPATIONAL THERAPY ASSISTANT

## 2025-07-01 ENCOUNTER — THERAPY VISIT (OUTPATIENT)
Dept: OCCUPATIONAL THERAPY | Facility: REHABILITATION | Age: 55
End: 2025-07-01
Payer: COMMERCIAL

## 2025-07-01 DIAGNOSIS — R27.9 INCOORDINATION: Primary | ICD-10-CM

## 2025-07-01 PROCEDURE — 97112 NEUROMUSCULAR REEDUCATION: CPT | Mod: GO | Performed by: OCCUPATIONAL THERAPY ASSISTANT

## 2025-07-01 PROCEDURE — 97110 THERAPEUTIC EXERCISES: CPT | Mod: GO | Performed by: OCCUPATIONAL THERAPY ASSISTANT

## 2025-07-03 ENCOUNTER — PATIENT OUTREACH (OUTPATIENT)
Dept: NURSING | Facility: CLINIC | Age: 55
End: 2025-07-03
Payer: COMMERCIAL

## 2025-07-03 ENCOUNTER — TELEPHONE (OUTPATIENT)
Dept: FAMILY MEDICINE | Facility: CLINIC | Age: 55
End: 2025-07-03

## 2025-07-03 ASSESSMENT — ACTIVITIES OF DAILY LIVING (ADL): DEPENDENT_IADLS:: INDEPENDENT

## 2025-07-03 NOTE — LETTER
[facility]  Patient Centered Plan of Care  About Me:        Patient Name:  Rafael Colin    YOB: 1970  Age:         55 year old   Maddi MRN:    [id number] Telephone Information:  Home Phone [phone]   Mobile [phone]       Address:  [address] [place] [address] Email address:  No e-mail address on record      Emergency Contact(s)    Name Relationship Lgl Grd Work Phone Home Phone Mobile Phone   1. RAFAEL,LAY Daughter    [phone]           Primary language:  Claudia     needed? Yes   [employer]:  [phone] op. 1  Other communication barriers:Language barrier; Lack of coping    Preferred Method of Communication:     Current living arrangement: I live in a private home with family    Mobility Status/ Medical Equipment: Independent w/Device        Health Maintenance  Health Maintenance Reviewed: Due/Overdue       My Access Plan  Medical Emergency 911   Primary Clinic Line [facility] [phone]   24 Hour Appointment Line [phone] or  [phone]) (toll-free)   24 Hour Nurse Line [phone] (toll-free)   Preferred Urgent Care [facility] [phone]     [facility]  [phone]     Preferred Pharmacy [facility] - [place], [place] - [address] Behavioral Health Crisis Line The National Suicide Prevention Lifeline at 1-422.554.2339 or Text/Call 618           My Care Team Members  Patient Care Team         Relationship Specialty Notifications Start End    Dread-Kristi Roman MD PCP - General Family Medicine  11/18/24     Phone: [phone] Fax: [phone]         [address] [place] [address], [address] [facility] [address]    Lona Longoria RN Lead Care Coordinator Primary Care - CC Admissions 6/20/24     Phone: [phone]         Karsten Lewis CHW Community Health Worker Primary Care - CC Admissions 6/20/24     Phone: [phone] Fax:  [phone]         [address] [place] [address] [address] [facility] [address]    Hi Ramey MD Assigned Neuroscience Provider   7/23/24     Phone: [phone] Fax: [phone] [address] [facility] [address]    Grover Rosa AuD Audiologist Audiology  10/31/24     Phone: [phone] Fax: [phone] [address] [facility] [address]    Marisel Maxwell APRN CNP Nurse Practitioner Otolaryngology  10/31/24     Phone: [phone] Fax: [phone] [address] [facility] [address]    Pya Raw [facility] worker   11/21/24     [facility] services: [facility] worker is Claudia speaking.    Phone: [phone]         Rony Whitt.    Kristi Adams MD Assigned PCP   11/23/24     Phone: [phone] Fax: [phone]         [address] [place] [address], [address] [facility] [address]    April Linares AuD Audiologist Audiology  1/29/25     Phone: [phone] Fax: [phone]         [address] [facility] [address]    Marisel Maxwell APRN CNP Assigned Surgical Provider   2/23/25     Phone: [phone] Fax: [phone] [address] [facility] [address]    Incontinence supply    2/28/25     Phone: [phone]         [facility] [address] [place] [address] [phone]    Pa Ruiz OD MD Ophthalmology  4/29/25     Phone: [phone] Fax: [phone]         [address] [place] [address]    Charlette Paw Personal Care Attendant PCA   6/12/25     4 hours per day.    Phone: [phone]                     My Care Plans  Self Management and Treatment Plan    Care Plan  Care Plan: OT       Problem: imparied mobility       Goal: I will attend outpatient OT in the next 90 days.       Start Date: 2/20/2025 Expected End Date: 5/30/2025    This Visit's Progress: 50% Recent Progress: 40%    Note:     Barriers: Language barrier and lack of community  resources.  Strengths: Willing to accept help.  Patient expressed understanding of goal: Yes.    Action steps to achieve this goal:  1. I will attend my OT appointment as scheduled on   5/05/2025 at 8:30 PM Completed  5/12/25 at 10:00 am - no showed  5/19/25 at 9:15 am Completed  5/28/25 at 9:15 am. Reminded patient and transportation requested with [employer].  6/17/25 at 9:30 am Reminded and ride set up.  6/24/2025 at 12:30 PM. Ride requested and reminded patient.  7/01/2025 at 12:30 PM. Ride requested and reminded patient.  7/08/2025 at 12:30 PM. Ride requested and reminded patient.     2. I will schedule for more follow up if recommended and needed.  3. I will update CCC team at outreach.                             Care Plan: audiology and ENT       Problem: hearing loss       Goal: Patient will attend audiology and ENT appointments in the next 12 months.       Start Date: 2/20/2025 Expected End Date: 12/31/2025    This Visit's Progress: 50% Recent Progress: 40%    Note:     Barriers: language barrier, low literacy, noncompliance, and lack of knowledge how to navigate complex health care system  Strengths: motivated to attend appt  Patient expressed understanding of goal: Yes    Action steps to achieve this goal:  1. I will attend hearing aid fitting appointment on 5/22/2025 at 1:00 PM for hearing aid fitting. Reminded and transportation requested.  2. I will attend my initial hearing aid check on 6/12/2025 at 1:00 PM for hearing aid initial check. Reminded and transportation requested.  3. I will follow up with CCC regarding this goal at each outreach until it is completed.                               Action Plans on File:                       Advance Care Plans/Directives:   Advanced Care Plan/Directives on file: No    Discussed with patient/caregiver(s): Declined Further Information             My Medical and Care Information  Problem List   Patient Active Problem List   Diagnosis    Hyperlipidemia LDL  goal <130    Neck Cervical Mass Left (___cm)    Allergic rhinitis    Vitamin D Deficiency    Obesity    Deafness in left ear    Lumbar back pain    Left-sided weakness    Multiple cerebral infarctions (H)    Intracranial atherosclerosis    Prediabetes    Female stress incontinence    Essential hypertension    Alteration of sensations, post-stroke    Left foot pain    Upper back pain    Major depressive disorder, single episode, in remission      Current Medications:  Please refer to the most recent medication list provided to you by your medical team and reach out to your provider with any questions or to make any corrections.    Care Coordination Start Date: 6/20/2024   Frequency of Care Coordination: 6 weeks, more frequently as needed     Form Last Updated: 07/03/2025

## 2025-07-03 NOTE — PROGRESS NOTES
Clinic Care Coordination Contact  Clinic Care Coordination Contact  OUTREACH    Referral Information:  Referral Source: IP Handoff    Primary Diagnosis: Neurological Disorders    Chief Complaint   Patient presents with    Clinic Care Coordination - Initial     Annual re-assessment      Clinic Utilization  Difficulty keeping appointments:: No  Compliance Concerns: No  No-Show Concerns: No  No PCP office visit in Past Year: No  Utilization      No Show Count (past year)  21             ED Visits  0             Hospital Admissions  0                    Current as of: 7/3/2025  8:25 AM            Clinical Concerns:  Annual re-assessment completed with patient via phone today. Patient states she still needs support from CC due to language barrier, lack of support and unable to navigate complex healthcare system. Patient is actively working on goals.     OT and PT  Patient is scheduled to follow up with OT and PT weekly. Goal updated today.     Audiology and ENT  Patient attended her follow up hearing aid check up on 5/22/25 and 6/12/25. Recommended to return at least every 9-12 months for cleaning and assessment of hearing aid and/or may return for a cochlear implant evaluation if she desires. Patient states she will think about this and will notify [facility] if she decides to do so for a cochlear implant evaluation. Goal completed today.       Pain  Pain (GOAL):: No  Health Maintenance Reviewed: Due/Overdue  Clinical Pathway: None    Medication Management:  Medication review status: Medications reviewed and no changes reported per patient.           Functional Status:  Dependent ADLs:: Independent  Dependent IADLs:: Independent  Bed or wheelchair confined:: No  Mobility Status: Independent w/Device  Fallen 2 or more times in the past year?: No  Any fall with injury in the past year?: No    Living Situation:  Current living arrangement:: I live in a private home with family  Type of residence:: Apartment    Lifestyle &  Psychosocial Needs:    Social Drivers of Health     Food Insecurity: Low Risk  (1/7/2025)    Food Insecurity     Within the past 12 months, did you worry that your food would run out before you got money to buy more?: No     Within the past 12 months, did the food you bought just not last and you didn t have money to get more?: No   Depression: Not at risk (4/8/2025)    PHQ-2     PHQ-2 Score: 1   Recent Concern: Depression - At risk (2/18/2025)    PHQ-2     PHQ-2 Score: 5   Housing Stability: Low Risk  (1/7/2025)    Housing Stability     Do you have housing? : Yes     Are you worried about losing your housing?: No   Tobacco Use: Low Risk  (6/9/2025)    Patient History     Smoking Tobacco Use: Never     Smokeless Tobacco Use: Never     Passive Exposure: Never   Financial Resource Strain: Low Risk  (1/7/2025)    Financial Resource Strain     Within the past 12 months, have you or your family members you live with been unable to get utilities (heat, electricity) when it was really needed?: No   Alcohol Use: Not on file   Transportation Needs: Low Risk  (1/7/2025)    Transportation Needs     Within the past 12 months, has lack of transportation kept you from medical appointments, getting your medicines, non-medical meetings or appointments, work, or from getting things that you need?: No   Physical Activity: Not on file   Interpersonal Safety: Low Risk  (1/7/2025)    Interpersonal Safety     Do you feel physically and emotionally safe where you currently live?: Yes     Within the past 12 months, have you been hit, slapped, kicked or otherwise physically hurt by someone?: No     Within the past 12 months, have you been humiliated or emotionally abused in other ways by your partner or ex-partner?: No   Stress: Not on file   Social Connections: Not on file   Health Literacy: Not on file     Diet:: Regular  Inadequate nutrition (GOAL):: No  Tube Feeding: No  Inadequate activity/exercise (GOAL):: Yes  Significant changes in  sleep pattern (GOAL): No  Transportation means:: Regular car, Medical transport     Sabianist or spiritual beliefs that impact treatment:: No  Mental health DX:: No  Mental health management concern (GOAL):: No  Chemical Dependency Status: No Current Concerns      Resources and Interventions:  Current Resources:      Community Resources: [facility], PCA, Financial/Insurance, County Programs, County Worker  Supplies Currently Used at Home: None  Equipment Currently Used at Home: none  Employment Status: unemployed     Advance Care Plan/Directive  Advanced Care Plans/Directives on file:: No  Discussed with patient/caregiver:: Declined Further Information    Referrals Placed: None     Care Plan:  Care Plan: Establish care Completed 12/18/2024      Problem: est care  Resolved 12/18/2024      Goal: Patient would like to establish care with a new PCP in the next 6 months.  Completed 11/21/2024      Start Date: 6/20/2024 Expected End Date: 12/31/2024    This Visit's Progress: 100% Recent Progress: 60%    Note:     Barriers: language barrier, low literacy, noncompliance, and lack of knowledge how to navigate complex health care system  Strengths: motivated to attend appt  Patient expressed understanding of goal: Yes    Action steps to achieve this goal:  1. I will answer my phone when I am contacted to schedule my appointment.  2. I will attend my establish care appointment as scheduled on 8/22/2024 at 1:40pm. No showed  3. I will attend my rescheduled est care appointment with Dr Roman on 11/18/2024 at 3:10 pm. Completed.  4. CCC RN will assist patient to reschedule establish care with a provider while in the clinic.  5. I will follow up with CCC regarding this goal at each outreach until it is completed.                             Care Plan: Neurology Completed 11/4/2024      Problem: CVA  Resolved 11/4/2024      Goal: Patient will attend her neurology appointment in the next 6 months.  Completed 11/4/2024      Start Date:  6/20/2024 Expected End Date: 12/31/2024    This Visit's Progress: 100% Recent Progress: 50%    Note:     Barriers: language barrier, low literacy, noncompliance, and lack of knowledge how to navigate complex health care system  Strengths: motivated to attend appt  Patient expressed understanding of goal: Yes    Action steps to achieve this goal:  1. I will attend my neurology appointment as scheduled on 6/24/24 at 2:15pm. Completed.  2. I will attend my 4 months follow up appointment with neurologist as scheduled on 10/10/2024 at 2:15 PM. Completed.  3. I will attend my follow up appointment in one year, 10/20/2025 at 1:45 PM.                             Care Plan: PCA service Completed 7/3/2025      Problem: imparied mobility  Resolved 7/3/2025      Goal: Patient would like to explore if she could qualify for PCA service in the next 12 months.  Completed 6/12/2025      Start Date: 7/16/2024 Expected End Date: 7/31/2025    This Visit's Progress: 100% Recent Progress: 80%    Note:     Barriers: language  Strengths: Willing to accept CCC support,   Patient expressed understanding of goal: Yes    Action steps to achieve this goal:  1. I will answer my phone when [facility]  calls to schedule an in-person assessment. [facility] contacted patient on 5/14/2025 and scheduled for assessment on 5/20/2025 at 1:00 AM.  2. I will be home when assessment is scheduled. Completed.  3. I will update CCC team at outreach.     Notes: Patient is receiving 4 hours of PCA services per day.                             Care Plan: PT Completed 12/18/2024      Problem: Impaired mobility  Resolved 12/18/2024      Goal: Patient will attend PT appointment in the next 4 months.  Completed 11/21/2024      Start Date: 8/26/2024 Expected End Date: 12/31/2024    This Visit's Progress: 100% Recent Progress: 40%    Note:     Barriers: language  Strengths: Willing to accept support  Patient expressed understanding of goal: Yes    Action steps to  achieve this goal:  1. I will answer my phone when I am contacted to schedule my initial Physical Therapy appointment.  2. I will attend my initial Physical Therapy appointment on on 9/24/24 at 12:35pm. Attended.  3. I will follow up PT appointments as scheduled on 10/08/2024 at 1:50 PM  was no showed.   4. I will attend my follow up PT appointment on 10/22/24. Completed.   5. I will update CCC team at outreach.     Note: Patient is no longer interested in attending due to cold weather and not helpful.                            Care Plan: DME Completed 2/20/2025      Problem: impaired mobility  Resolved 2/20/2025      Goal: Patient would like to explore if she could qualify for a cane and incontinence supply in the next 90 days.  Completed 2/20/2025      Start Date: 8/26/2024 Expected End Date: 12/31/2024    This Visit's Progress: 100% Recent Progress: 60%    Note:     Barriers: language   Strengths: Accepting of support  Patient expressed understanding of goal: Yes    Action steps to achieve this goal:  1. I will take hard copy to [facility] with support from my arm worker in the next 30 days. Thida  patient has not received it yet.  2. I will follow up with [facility] in the next month regarding this goal for additional coordination support.    Note: [facility] will drop off care to patient's home.                             Care Plan: Neuropsychological evaluation Completed 5/22/2025      Problem: hx of stroke  Resolved 5/22/2025      Goal: I would like to get neruopsycological evaluation in the next 3 months.  Completed 4/14/2025      Start Date: 2/20/2025 Expected End Date: 5/28/2025    This Visit's Progress: 100% Recent Progress: 10%    Note:     Barriers: Language barrier and chronic illness.  Strengths: Willing to accept helps and able to communicate in own language.   Patient expressed understanding of goal: Yes.     Action steps to achieve this goal:  I attended neuropsychological evaluation  as scheduled on 3/27/2025 at 9:00 AM.   I will received a copy of neuropsychological evaluation from [facility] once completed.  3.   I will update [facility] team at outreach.                              Care Plan: Eye exam Completed 3/26/2025      Problem: HP GENERAL PROBLEM  Resolved 3/26/2025      Goal: I would like to get annual eye exam done in the next 30 days.  Completed 3/26/2025      Start Date: 2/20/2025 Expected End Date: 3/19/2025    This Visit's Progress: 100% Recent Progress: 10%    Note:     Barriers: Language barrier and lack of community resources.   Strengths: Able to understand instruction and willing to accept helps   Patient expressed understanding of goal: Yes.     Action steps to achieve this goal:  1. I will attend my eye exam appointment as scheduled on 3/12/2025 at 2:55 PM. Completed. To return in 1 yr.  2. I will answer my phone when  calls for .  3. I will update [facility] team at outreach.      [facility]  [address]  [place] [address]  [phone]    -3/12/2025 2:55 PM, Dr. Antony [place]                             Care Plan: Orthotics Completed 5/22/2025      Problem: HP GENERAL PROBLEM  Resolved 5/22/2025      Goal: I would like to be seen at orthotic clinic in the next 2 months.  Completed 5/15/2025      Start Date: 2/20/2025 Expected End Date: 4/30/2025    This Visit's Progress: 100% Recent Progress: 20%    Note:     Barriers: Language barrier and lack of community resources.  Strengths: Willing to accept help and has [facility] services.  Patient expressed understanding of goal: Yes.    Action steps to achieve this goal:  1. I will attend my ortho appointment as scheduled on 4/22/2025 at 9:00 AM fordelivery ASO ankle brace. Completed and received ankle brace and orthotic shoes.  2. I will reach out to CCC team for additional questions or concerns.  3. I will update [facility] team at outreach.      Orthotics   [address]  [facility] [address]  Appt  # [phone]  [facility] #[phone]                            Care Plan: OT and PT       Problem: imparied mobility       Goal: I will attend outpatient OT and PT in the next 10 months.       Start Date: 2/20/2025 Expected End Date: 12/31/2025    Recent Progress: 50%    Note:     Barriers: Language barrier and lack of community resources.  Strengths: Willing to accept help.  Patient expressed understanding of goal: Yes.    Action steps to achieve this goal:  1. I will attend my OT appointment as scheduled on   5/05/2025 at 8:30 PM Completed  5/12/25 at 10:00 am - no showed  5/19/25 at 9:15 am Completed  5/28/25 at 9:15 am. Completed  6/17/25 at 9:30 am Completed  6/24/2025 at 12:30 PM. Completed  7/01/2025 at 12:30 PM. Completed  7/08/2025 at 12:30 PM. Completed  7/24/25 at 9:30 am  7/31/25 at 9:30 am  8/7/25 at 8:45 am  8/14/25 at 11:00 am  8/21/25 at 11:00 am  8/27/25 at 10:00 am.    PT  7/22/25 at 7:30 am  7/29/25 at 1:30 pm    2. I will schedule for more follow up if recommended and needed.  3. I will update CCC team at outreach.                             Care Plan: audiology and ENT Completed 7/14/2025      Problem: hearing loss  Resolved 7/14/2025      Goal: Patient will attend audiology and ENT appointments in the next 12 months.  Completed 7/14/2025      Start Date: 2/20/2025 Expected End Date: 12/31/2025    This Visit's Progress: 100% Recent Progress: 50%    Note:     Barriers: language barrier, low literacy, noncompliance, and lack of knowledge how to navigate complex health care system  Strengths: motivated to attend appt  Patient expressed understanding of goal: Yes    Action steps to achieve this goal:  1. I will attend hearing aid fitting appointment on 5/22/2025 at 1:00 PM for hearing aid fitting. Completed.  2. I will attend my initial hearing aid check on 6/12/2025 at 1:00 PM for hearing aid initial check. Completed. To return in 9-12 months for cleaning and checkup   3. I will follow up with  CCC regarding this goal at each outreach until it is completed.                               Outreach Frequency: 6 weeks, more frequently as needed  Future Appointments                Today VIRTUAL ; SANDRINE CCC RN M [facility]    In 5 days Elida Mejia; Noelle Gamez L, OT M [facility]    In 2 weeks Ellyn Watts, PT M [facility]    In 3 weeks Tung Walton, PT M [facility]    In 1 month Kristi Adams MD [facility]    In 3 months Hi Ramey MD [facility] Neurology [facility]            Plan: W to request medical transportation for upcoming appts.

## 2025-07-08 ENCOUNTER — THERAPY VISIT (OUTPATIENT)
Dept: OCCUPATIONAL THERAPY | Facility: REHABILITATION | Age: 55
End: 2025-07-08
Payer: COMMERCIAL

## 2025-07-08 DIAGNOSIS — R27.9 INCOORDINATION: Primary | ICD-10-CM

## 2025-07-08 PROCEDURE — 97530 THERAPEUTIC ACTIVITIES: CPT | Mod: GO | Performed by: OCCUPATIONAL THERAPY ASSISTANT

## 2025-07-08 PROCEDURE — T1013 SIGN LANG/ORAL INTERPRETER: HCPCS

## 2025-07-09 ENCOUNTER — PATIENT OUTREACH (OUTPATIENT)
Dept: CARE COORDINATION | Facility: CLINIC | Age: 55
End: 2025-07-09
Payer: COMMERCIAL

## 2025-07-09 NOTE — PROGRESS NOTES
Clinic Care Coordination Contact  [facility]/Voicemail    Clinical Data: Care Coordinator Outreach    Outreach Documentation Number of Outreach Attempt   7/9/2025   4:57 PM 1     Unable to leave a message due to: Voicemail is not set up.      Plan: Care Coordinator will try to reach patient again in two weeks.

## 2025-07-21 NOTE — PROGRESS NOTES
PHYSICAL THERAPY EVALUATION  Type of Visit: Evaluation       Fall Risk Screen:  Have you fallen 2 or more times in the past year?: No  Have you fallen and had an injury in the past year?: Yes  Timed Up and Go score (seconds): see torito  Is patient receiving Physical Therapy Services?: Yes    Subjective   Pt is a 55 year-old woman with chief complaint recent fall s/p stroke and left-sided hemiparesis. Her stroke was June 2024. She was seen for PT x 4 appointments earlier this year, but pt felt she was improving. She did recently fall once in the past month or so; when taking a shower she slipped when stepping into the shower. She has an AFO for the left ankle since last year, but when going into the shower she didn't have it on.       Presenting condition or subjective complaint: Exercise  Date of onset: 06/17/25 (date of order)    Relevant medical history: High blood pressure   Dates & types of surgery: Back surgery    Prior diagnostic imaging/testing results: MRI; X-ray     Prior therapy history for the same diagnosis, illness or injury: Yes Do not remember    Prior Level of Function  Transfers: Independent  Ambulation: Assistive equipment; SEC   ADL: Independent      Living Environment  Social support: Alone   Type of home: Apartment/condo; 1 level   Stairs to enter the home: Yes 7 Is there a railing: Yes     Ramp: Yes   Stairs inside the home: Yes 7 Is there a railing: Yes     Help at home: Home management tasks (cooking, cleaning)  Equipment owned: Straight Cane     Employment: No    Hobbies/Interests: listening gospel    Patient goals for therapy: Able to walk    Pain assessment: Pain present  Location: left achilles /Rating: with WB, 10/10      Objective      Cognitive Status Examination  Orientation: Oriented to person, place and time   Level of Consciousness: Alert  Follows Commands and Answers Questions: 100% of the time  Personal Safety and Judgement: Intact  Memory: Intact    INTEGUMENTARY:   POSTURE:    PALPATION: tender left achilles tendon  RANGE OF MOTION: left ankle limited in AROM d/t weakness by grossly 90%  STRENGTH:   Left ankle DF: 1/5, great toe ext: 0/5, ankle eversion: 2/5, ankle inversion: 3/5, ankle PF (in sitting): 2/5  Left knee flx: 2/5  Left knee ext: 2-/5  Left hip adduction: 2-/5    BED MOBILITY:     TRANSFERS: mod I with UE support, increased right LE WB      GAIT:   Level of Hill: mod I  Assistive Device(s): Cane (single end)  Gait Deviations: slow pace, asymmetrical step length    Gait speed: .9ft/s or .27 m/s  BALANCE:     SPECIAL TESTS  Functional Gait Assessment (FGA)      10 Meter Walk Test (Comfortable)     10 Meter Walk Test (Fast)     6 Minute Walk Test (6MWT)           Padgett Balance Scale (BBS)     5 Times Sit-to-Stand (5TSTS) 36.6 sec with increased right LE WB      Dynamic Gait Index (DGI)     Timed Up and Go (TUG) - sec 29.5 sec with SEC   Single Leg Stance Right (sec)    Single Leg Stance Left (sec)    Modified CTSIB Conditions (sec) Cond 1:   Cond 2:   Cond 4:   Cond 5 :    Romberg  (sec)    Sharpened Romberg (sec)    30 Second Sit to Stand (reps/height)    Mini-BESTest              SENSATION:     REFLEXES:     MUSCLE TONE:         Assessment & Plan   CLINICAL IMPRESSIONS  Medical Diagnosis: Fall    Treatment Diagnosis: Imbalance, difficulty in walking   Impression/Assessment: Pt is a 55 year-old woman with chief complaint recent fall s/p stroke and left-sided hemiparesis. Her stroke was June 2024 and she was seen for PT x 4 appointments earlier this year, but pt felt she was improving so she discharged. She demonstrates left-sided hemiparesis with use of AFO on the L ankle, instability/high falls risk and difficulty with ambulation. She is appropriate for skilled PT to address impairments, instruct in HEP to reduce falls risk and improve functional mobility.    Clinical Decision Making (Complexity):  Clinical Presentation: Stable/Uncomplicated  Clinical Presentation  Rationale: based on medical and personal factors listed in PT evaluation  Clinical Decision Making (Complexity): Low complexity    PLAN OF CARE  Treatment Interventions:  Interventions: Gait Training, Manual Therapy, Neuromuscular Re-education, Therapeutic Activity, Therapeutic Exercise, Self-Care/Home Management    Long Term Goals     PT Goal 1  Goal Identifier: Gait speed  Goal Description: Pt will improve gait speed from .27 m/s to >.8m/s with appropriate AD to allow pt to perform community mobility.  Target Date: 09/30/25  PT Goal 2  Goal Identifier: 5xSTS  Goal Description: Pt will improve 30 sec STS from 36.6 sec to <20 sec to indicate decreased falls risk.  Target Date: 09/30/25  PT Goal 3  Goal Identifier: TUG  Goal Description: Pt will improve TUG from 29.5 sec to <13.5 sec to indicate low falls risk.  Target Date: 09/30/25      Frequency of Treatment: 1x/week  Duration of Treatment: 5-10 visits, up to 10 weeks         Risks and benefits of evaluation/treatment have been explained.   Patient/Family/caregiver agrees with Plan of Care.     Evaluation Time:     PT Eval, Low Complexity Minutes (91998): 19       Signing Clinician: Ellyn Watts PT        [facility]                                                                                   OUTPATIENT PHYSICAL THERAPY      PLAN OF TREATMENT FOR OUTPATIENT REHABILITATION   Patient's Last Name, First Name, M.I.  Bi,Paw    YOB: 1970   Provider's Name   [facility]   Medical Record No.  [id number]     Onset Date: 06/17/25 (date of order)  Start of Care Date: 07/22/25     Medical Diagnosis:  Fall      PT Treatment Diagnosis:  Imbalance, difficulty in walking Plan of Treatment  Frequency/Duration: 1x/week/ 5-10 visits, up to 10 weeks    Certification date from 07/22/25 to 09/30/25         See note for plan of treatment details and functional goals     Ellyn Watts PT                          I CERTIFY THE NEED FOR THESE SERVICES FURNISHED UNDER        THIS PLAN OF TREATMENT AND WHILE UNDER MY CARE     (Physician attestation of this document indicates review and certification of the therapy plan).              Referring Provider:  Kristi Canada-Jessie    Initial Assessment  See Epic Evaluation- Start of Care Date: 07/22/25

## 2025-07-22 ENCOUNTER — THERAPY VISIT (OUTPATIENT)
Dept: PHYSICAL THERAPY | Facility: REHABILITATION | Age: 55
End: 2025-07-22
Attending: FAMILY MEDICINE
Payer: COMMERCIAL

## 2025-07-22 ENCOUNTER — PATIENT OUTREACH (OUTPATIENT)
Dept: NURSING | Facility: CLINIC | Age: 55
End: 2025-07-22
Payer: COMMERCIAL

## 2025-07-22 DIAGNOSIS — R26.89 IMBALANCE: Primary | ICD-10-CM

## 2025-07-22 DIAGNOSIS — I63.9 MULTIPLE CEREBRAL INFARCTIONS (H): Primary | ICD-10-CM

## 2025-07-22 DIAGNOSIS — E55.9 VITAMIN D DEFICIENCY: ICD-10-CM

## 2025-07-22 DIAGNOSIS — M79.672 LEFT FOOT PAIN: ICD-10-CM

## 2025-07-22 DIAGNOSIS — R53.1 LEFT-SIDED WEAKNESS: ICD-10-CM

## 2025-07-22 DIAGNOSIS — R73.03 PREDIABETES: ICD-10-CM

## 2025-07-22 DIAGNOSIS — R26.89 IMBALANCE: ICD-10-CM

## 2025-07-22 DIAGNOSIS — E78.5 HYPERLIPIDEMIA LDL GOAL <130: ICD-10-CM

## 2025-07-22 DIAGNOSIS — I10 ESSENTIAL HYPERTENSION: ICD-10-CM

## 2025-07-22 DIAGNOSIS — R26.2 DIFFICULTY IN WALKING: ICD-10-CM

## 2025-07-22 DIAGNOSIS — H91.92 DEAFNESS IN LEFT EAR: ICD-10-CM

## 2025-07-22 DIAGNOSIS — E66.09 OBESITY DUE TO EXCESS CALORIES, UNSPECIFIED CLASS, UNSPECIFIED WHETHER SERIOUS COMORBIDITY PRESENT: ICD-10-CM

## 2025-07-22 DIAGNOSIS — F32.5 MAJOR DEPRESSIVE DISORDER, SINGLE EPISODE, IN REMISSION: ICD-10-CM

## 2025-07-22 PROCEDURE — 97161 PT EVAL LOW COMPLEX 20 MIN: CPT | Mod: GP | Performed by: PHYSICAL THERAPIST

## 2025-07-22 PROCEDURE — 97110 THERAPEUTIC EXERCISES: CPT | Mod: GP | Performed by: PHYSICAL THERAPIST

## 2025-07-22 NOTE — PROGRESS NOTES
Clinic Care Coordination Contact  Follow Up Progress Note      Assessment: to review TCCPW recommendations completed on 3/27/25. CCRN spoke with patient and reviewed recommendations.       TCCPW recommendations:     PCA service - new PCA - [facility] - Approved 4 hours per day.   RSDI - per patient, she was qualified for RSDI $1275 per month. Patient already received the payment the past 2 months.   PT and OT - patient continues to attend PT and OT appointments. Patient scheduled out until Sept, 25. [facility] to assist with transportation.   [facility] worker - Pt confirmed armsanjuanita continues to visit her 2x/week on Tuesdays and Thursdays through [facility].   Therapy - Patient confirmed she's talking to a therapist monthly through [facility].  Medical care for follow up - patient is scheduled to see her PCP on 8/18/25 at 9:40 am.   Re-evaluation with TCCPW in 2 yrs ( due around 3/27/27)  Med compliance - patient states she gets confused taking her meds sometimes. Agreed to home care skilled nursing. Message sent to PCP to place the referral. New goal created today.     Care Plans  Care Plan: OT and PT       Problem: imparied mobility       Goal: I will attend outpatient OT and PT in the next 10 months.       Start Date: 2/20/2025 Expected End Date: 12/31/2025    Recent Progress: 50%    Note:     Barriers: Language barrier and lack of community resources.  Strengths: Willing to accept help.  Patient expressed understanding of goal: Yes.    Action steps to achieve this goal:  1. I will attend my OT appointment as scheduled on   5/05/2025 at 8:30 PM Completed  5/12/25 at 10:00 am - no showed  5/19/25 at 9:15 am Completed  5/28/25 at 9:15 am. Completed  6/17/25 at 9:30 am Completed  6/24/2025 at 12:30 PM. Completed  7/01/2025 at 12:30 PM. Completed  7/08/2025 at 12:30 PM. Completed  7/24/25 at 9:30 am    7/31/25 at 9:30 am  8/7/25 at 8:45 am  8/14/25 at 11:00 am  8/21/25 at 11:00 am  8/27/25 at 10:00 am.    PT  7/22/25 at 7:30 am -  completed.   7/29/25 at 1:30 pm    2. I will schedule for more follow up if recommended and needed.  3. I will update CCC team at outreach.                             Care Plan: Home care skilled nursing       Problem: non-compliance       Goal: Patient would like to establish with a home care nurse for long term med set up in the next 90 days.       Start Date: 7/22/2025 Expected End Date: 10/22/2025    This Visit's Progress: 10%    Note:     Barriers: language and non-compliance  Strengths: Accepting of home care services  Patient expressed understanding of goal: Yes    Action steps to achieve this goal:  1. I will answer my phone when I am contacted to schedule my initial home care appointment.  2. I will be home and ready for my home care RN when they arrive.  3. I will follow up with CCC regarding this goal at each outreach until it is completed.     Note: [facility]erral order faxed to [facility]    [facility]  [email or web address]  [address], [place], [place] [id number] [phone]  Fax: [phone]                                   Outreach Frequency: 6 weeks, more frequently as needed    Plan:   1) Moved [facility] outreach on 7/24/25 to 8/4/25 to check [place] and request medical ride for upcoming appts.   2) Message sent to PCP to place home care referral.

## 2025-07-22 NOTE — Clinical Note
FYI: I moved your outreach on 7/24/25 to 8/4/25 to check [place] and request medical ride for upcoming appts.

## 2025-07-24 ENCOUNTER — THERAPY VISIT (OUTPATIENT)
Dept: OCCUPATIONAL THERAPY | Facility: REHABILITATION | Age: 55
End: 2025-07-24
Payer: COMMERCIAL

## 2025-07-24 DIAGNOSIS — R27.9 INCOORDINATION: Primary | ICD-10-CM

## 2025-07-29 ENCOUNTER — THERAPY VISIT (OUTPATIENT)
Dept: PHYSICAL THERAPY | Facility: REHABILITATION | Age: 55
End: 2025-07-29
Payer: COMMERCIAL

## 2025-07-29 DIAGNOSIS — R26.2 DIFFICULTY IN WALKING: ICD-10-CM

## 2025-07-29 DIAGNOSIS — R26.89 IMBALANCE: Primary | Chronic | ICD-10-CM

## 2025-07-29 PROCEDURE — 97112 NEUROMUSCULAR REEDUCATION: CPT | Mod: GP

## 2025-07-29 PROCEDURE — 97116 GAIT TRAINING THERAPY: CPT | Mod: GP

## 2025-07-29 PROCEDURE — 97110 THERAPEUTIC EXERCISES: CPT | Mod: GP

## 2025-07-31 ENCOUNTER — THERAPY VISIT (OUTPATIENT)
Dept: OCCUPATIONAL THERAPY | Facility: REHABILITATION | Age: 55
End: 2025-07-31
Payer: COMMERCIAL

## 2025-07-31 DIAGNOSIS — R27.9 INCOORDINATION: Primary | ICD-10-CM

## 2025-08-04 ENCOUNTER — PATIENT OUTREACH (OUTPATIENT)
Dept: CARE COORDINATION | Facility: CLINIC | Age: 55
End: 2025-08-04
Payer: COMMERCIAL

## 2025-08-04 DIAGNOSIS — M54.9 UPPER BACK PAIN: ICD-10-CM

## 2025-08-04 DIAGNOSIS — F32.1 MAJOR DEPRESSIVE DISORDER, SINGLE EPISODE, MODERATE (H): ICD-10-CM

## 2025-08-04 RX ORDER — PSEUDOEPHED/ACETAMINOPH/DIPHEN 30MG-500MG
TABLET ORAL
Qty: 50 TABLET | Refills: 1 | Status: SHIPPED | OUTPATIENT
Start: 2025-08-04

## 2025-08-07 ENCOUNTER — THERAPY VISIT (OUTPATIENT)
Dept: OCCUPATIONAL THERAPY | Facility: REHABILITATION | Age: 55
End: 2025-08-07
Payer: COMMERCIAL

## 2025-08-14 ENCOUNTER — THERAPY VISIT (OUTPATIENT)
Dept: OCCUPATIONAL THERAPY | Facility: REHABILITATION | Age: 55
End: 2025-08-14
Payer: COMMERCIAL

## 2025-08-14 DIAGNOSIS — R27.9 INCOORDINATION: Primary | ICD-10-CM

## 2025-08-18 ENCOUNTER — OFFICE VISIT (OUTPATIENT)
Dept: FAMILY MEDICINE | Facility: CLINIC | Age: 55
End: 2025-08-18
Payer: COMMERCIAL

## 2025-08-18 ENCOUNTER — TELEPHONE (OUTPATIENT)
Dept: FAMILY MEDICINE | Facility: CLINIC | Age: 55
End: 2025-08-18

## 2025-08-18 ENCOUNTER — VIRTUAL VISIT (OUTPATIENT)
Dept: INTERPRETER SERVICES | Facility: CLINIC | Age: 55
End: 2025-08-18

## 2025-08-18 VITALS
WEIGHT: 146 LBS | DIASTOLIC BLOOD PRESSURE: 86 MMHG | SYSTOLIC BLOOD PRESSURE: 138 MMHG | BODY MASS INDEX: 32.84 KG/M2 | HEART RATE: 66 BPM | TEMPERATURE: 99.3 F | HEIGHT: 56 IN | RESPIRATION RATE: 18 BRPM | OXYGEN SATURATION: 100 %

## 2025-08-18 DIAGNOSIS — R20.9 ALTERATION OF SENSATIONS, POST-STROKE: ICD-10-CM

## 2025-08-18 DIAGNOSIS — H91.92 DEAFNESS IN LEFT EAR: ICD-10-CM

## 2025-08-18 DIAGNOSIS — I63.9 MULTIPLE CEREBRAL INFARCTIONS (H): ICD-10-CM

## 2025-08-18 DIAGNOSIS — I69.398 ALTERATION OF SENSATIONS, POST-STROKE: ICD-10-CM

## 2025-08-18 DIAGNOSIS — M79.672 LEFT FOOT PAIN: ICD-10-CM

## 2025-08-18 DIAGNOSIS — F32.5 MAJOR DEPRESSIVE DISORDER, SINGLE EPISODE, IN REMISSION: ICD-10-CM

## 2025-08-18 DIAGNOSIS — I10 ESSENTIAL HYPERTENSION: Primary | ICD-10-CM

## 2025-08-18 DIAGNOSIS — E78.5 HYPERLIPIDEMIA LDL GOAL <130: ICD-10-CM

## 2025-08-18 DIAGNOSIS — H53.8 BLURRY VISION, LEFT EYE: ICD-10-CM

## 2025-08-18 DIAGNOSIS — R63.4 WEIGHT LOSS: ICD-10-CM

## 2025-08-18 DIAGNOSIS — H93.11 TINNITUS, RIGHT: ICD-10-CM

## 2025-08-18 DIAGNOSIS — R26.2 DIFFICULTY IN WALKING: ICD-10-CM

## 2025-08-18 LAB
CHOLEST SERPL-MCNC: 164 MG/DL
FASTING STATUS PATIENT QL REPORTED: YES
HDLC SERPL-MCNC: 37 MG/DL
LDLC SERPL CALC-MCNC: 97 MG/DL
NONHDLC SERPL-MCNC: 127 MG/DL
TRIGL SERPL-MCNC: 150 MG/DL

## 2025-08-18 PROCEDURE — 36415 COLL VENOUS BLD VENIPUNCTURE: CPT | Performed by: FAMILY MEDICINE

## 2025-08-18 PROCEDURE — 3075F SYST BP GE 130 - 139MM HG: CPT | Performed by: FAMILY MEDICINE

## 2025-08-18 PROCEDURE — 99214 OFFICE O/P EST MOD 30 MIN: CPT | Performed by: FAMILY MEDICINE

## 2025-08-18 PROCEDURE — T1013 SIGN LANG/ORAL INTERPRETER: HCPCS | Mod: U4

## 2025-08-18 PROCEDURE — 80061 LIPID PANEL: CPT | Performed by: FAMILY MEDICINE

## 2025-08-18 PROCEDURE — G2211 COMPLEX E/M VISIT ADD ON: HCPCS | Performed by: FAMILY MEDICINE

## 2025-08-18 PROCEDURE — 3079F DIAST BP 80-89 MM HG: CPT | Performed by: FAMILY MEDICINE

## 2025-08-18 ASSESSMENT — PATIENT HEALTH QUESTIONNAIRE - PHQ9
SUM OF ALL RESPONSES TO PHQ QUESTIONS 1-9: 10
SUM OF ALL RESPONSES TO PHQ QUESTIONS 1-9: 10
10. IF YOU CHECKED OFF ANY PROBLEMS, HOW DIFFICULT HAVE THESE PROBLEMS MADE IT FOR YOU TO DO YOUR WORK, TAKE CARE OF THINGS AT HOME, OR GET ALONG WITH OTHER PEOPLE: VERY DIFFICULT

## 2025-08-19 ENCOUNTER — TELEPHONE (OUTPATIENT)
Dept: FAMILY MEDICINE | Facility: CLINIC | Age: 55
End: 2025-08-19
Payer: COMMERCIAL

## 2025-08-19 ENCOUNTER — TELEPHONE (OUTPATIENT)
Dept: NEUROLOGY | Facility: CLINIC | Age: 55
End: 2025-08-19
Payer: COMMERCIAL

## 2025-08-19 ENCOUNTER — RESULTS FOLLOW-UP (OUTPATIENT)
Dept: FAMILY MEDICINE | Facility: CLINIC | Age: 55
End: 2025-08-19
Payer: COMMERCIAL

## 2025-08-21 ENCOUNTER — THERAPY VISIT (OUTPATIENT)
Dept: OCCUPATIONAL THERAPY | Facility: REHABILITATION | Age: 55
End: 2025-08-21
Payer: COMMERCIAL

## 2025-08-21 DIAGNOSIS — R53.1 LEFT-SIDED WEAKNESS: ICD-10-CM

## 2025-08-21 DIAGNOSIS — I63.9 MULTIPLE CEREBRAL INFARCTIONS (H): Primary | ICD-10-CM

## 2025-08-27 ENCOUNTER — THERAPY VISIT (OUTPATIENT)
Dept: OCCUPATIONAL THERAPY | Facility: REHABILITATION | Age: 55
End: 2025-08-27
Payer: COMMERCIAL

## 2025-08-27 DIAGNOSIS — R27.9 INCOORDINATION: Primary | ICD-10-CM

## 2025-08-27 DIAGNOSIS — R41.9 COGNITIVE COMPLAINTS: ICD-10-CM

## 2025-08-27 DIAGNOSIS — R29.898 LEFT ARM WEAKNESS: ICD-10-CM

## 2025-08-27 PROCEDURE — 97110 THERAPEUTIC EXERCISES: CPT | Mod: GO | Performed by: OCCUPATIONAL THERAPIST

## 2025-09-04 ENCOUNTER — PATIENT OUTREACH (OUTPATIENT)
Dept: CARE COORDINATION | Facility: CLINIC | Age: 55
End: 2025-09-04
Payer: COMMERCIAL